# Patient Record
Sex: MALE | Race: ASIAN | NOT HISPANIC OR LATINO | URBAN - METROPOLITAN AREA
[De-identification: names, ages, dates, MRNs, and addresses within clinical notes are randomized per-mention and may not be internally consistent; named-entity substitution may affect disease eponyms.]

---

## 2017-01-01 ENCOUNTER — OFFICE VISIT (OUTPATIENT)
Dept: FAMILY MEDICINE | Facility: CLINIC | Age: 67
End: 2017-01-01
Payer: COMMERCIAL

## 2017-01-01 ENCOUNTER — TELEPHONE (OUTPATIENT)
Dept: FAMILY MEDICINE | Facility: CLINIC | Age: 67
End: 2017-01-01

## 2017-01-01 ENCOUNTER — ALLIED HEALTH/NURSE VISIT (OUTPATIENT)
Dept: NURSING | Facility: CLINIC | Age: 67
End: 2017-01-01
Payer: COMMERCIAL

## 2017-01-01 ENCOUNTER — APPOINTMENT (OUTPATIENT)
Dept: CT IMAGING | Facility: CLINIC | Age: 67
DRG: 872 | End: 2017-01-01
Attending: EMERGENCY MEDICINE
Payer: MEDICARE

## 2017-01-01 ENCOUNTER — CARE COORDINATION (OUTPATIENT)
Dept: CARE COORDINATION | Facility: CLINIC | Age: 67
End: 2017-01-01

## 2017-01-01 ENCOUNTER — APPOINTMENT (OUTPATIENT)
Dept: GENERAL RADIOLOGY | Facility: CLINIC | Age: 67
DRG: 872 | End: 2017-01-01
Attending: EMERGENCY MEDICINE
Payer: MEDICARE

## 2017-01-01 ENCOUNTER — TRANSFERRED RECORDS (OUTPATIENT)
Dept: HEALTH INFORMATION MANAGEMENT | Facility: CLINIC | Age: 67
End: 2017-01-01

## 2017-01-01 ENCOUNTER — RADIANT APPOINTMENT (OUTPATIENT)
Dept: GENERAL RADIOLOGY | Facility: CLINIC | Age: 67
End: 2017-01-01
Attending: PHYSICIAN ASSISTANT
Payer: COMMERCIAL

## 2017-01-01 ENCOUNTER — HOSPITAL ENCOUNTER (INPATIENT)
Facility: CLINIC | Age: 67
LOS: 4 days | Discharge: HOME | DRG: 872 | End: 2017-10-06
Attending: EMERGENCY MEDICINE | Admitting: HOSPITALIST
Payer: MEDICARE

## 2017-01-01 ENCOUNTER — RECORDS - HEALTHEAST (OUTPATIENT)
Dept: ADMINISTRATIVE | Facility: OTHER | Age: 67
End: 2017-01-01

## 2017-01-01 ENCOUNTER — APPOINTMENT (OUTPATIENT)
Dept: ULTRASOUND IMAGING | Facility: CLINIC | Age: 67
DRG: 872 | End: 2017-01-01
Attending: HOSPITALIST
Payer: MEDICARE

## 2017-01-01 VITALS
TEMPERATURE: 98.4 F | WEIGHT: 194 LBS | DIASTOLIC BLOOD PRESSURE: 77 MMHG | HEART RATE: 67 BPM | BODY MASS INDEX: 37.89 KG/M2 | RESPIRATION RATE: 16 BRPM | SYSTOLIC BLOOD PRESSURE: 126 MMHG

## 2017-01-01 VITALS
OXYGEN SATURATION: 100 % | RESPIRATION RATE: 16 BRPM | SYSTOLIC BLOOD PRESSURE: 124 MMHG | BODY MASS INDEX: 38.01 KG/M2 | HEART RATE: 80 BPM | WEIGHT: 193.6 LBS | DIASTOLIC BLOOD PRESSURE: 78 MMHG | TEMPERATURE: 98.1 F | HEIGHT: 60 IN

## 2017-01-01 VITALS
SYSTOLIC BLOOD PRESSURE: 106 MMHG | DIASTOLIC BLOOD PRESSURE: 69 MMHG | HEART RATE: 71 BPM | RESPIRATION RATE: 18 BRPM | WEIGHT: 186 LBS | BODY MASS INDEX: 36.33 KG/M2 | TEMPERATURE: 98.2 F

## 2017-01-01 VITALS
WEIGHT: 189 LBS | DIASTOLIC BLOOD PRESSURE: 90 MMHG | SYSTOLIC BLOOD PRESSURE: 130 MMHG | OXYGEN SATURATION: 99 % | RESPIRATION RATE: 22 BRPM | BODY MASS INDEX: 36.91 KG/M2 | TEMPERATURE: 103.2 F | HEART RATE: 94 BPM

## 2017-01-01 VITALS
SYSTOLIC BLOOD PRESSURE: 120 MMHG | WEIGHT: 189.6 LBS | DIASTOLIC BLOOD PRESSURE: 60 MMHG | HEART RATE: 60 BPM | BODY MASS INDEX: 37.03 KG/M2

## 2017-01-01 VITALS — SYSTOLIC BLOOD PRESSURE: 115 MMHG | DIASTOLIC BLOOD PRESSURE: 78 MMHG | HEART RATE: 60 BPM

## 2017-01-01 DIAGNOSIS — Z79.899 NEED FOR PROPHYLACTIC CHEMOTHERAPY: ICD-10-CM

## 2017-01-01 DIAGNOSIS — A41.9 SEVERE SEPSIS (H): ICD-10-CM

## 2017-01-01 DIAGNOSIS — B18.1 CHRONIC VIRAL HEPATITIS B WITHOUT DELTA AGENT AND WITHOUT COMA (H): ICD-10-CM

## 2017-01-01 DIAGNOSIS — Z94.0 KIDNEY REPLACED BY TRANSPLANT: ICD-10-CM

## 2017-01-01 DIAGNOSIS — Z79.52 LONG TERM CURRENT USE OF SYSTEMIC STEROIDS: ICD-10-CM

## 2017-01-01 DIAGNOSIS — R50.9 FEVER, UNSPECIFIED FEVER CAUSE: ICD-10-CM

## 2017-01-01 DIAGNOSIS — Z94.0 KIDNEY REPLACED BY TRANSPLANT: Primary | ICD-10-CM

## 2017-01-01 DIAGNOSIS — R65.20 SEVERE SEPSIS (H): ICD-10-CM

## 2017-01-01 DIAGNOSIS — N18.30 CKD (CHRONIC KIDNEY DISEASE) STAGE 3, GFR 30-59 ML/MIN (H): ICD-10-CM

## 2017-01-01 DIAGNOSIS — Z48.298 AFTERCARE FOLLOWING ORGAN TRANSPLANT: ICD-10-CM

## 2017-01-01 DIAGNOSIS — J45.901 EXACERBATION OF ASTHMA, UNSPECIFIED ASTHMA SEVERITY, UNSPECIFIED WHETHER PERSISTENT: ICD-10-CM

## 2017-01-01 DIAGNOSIS — K74.60 CIRRHOSIS OF LIVER WITH ASCITES, UNSPECIFIED HEPATIC CIRRHOSIS TYPE (H): Primary | ICD-10-CM

## 2017-01-01 DIAGNOSIS — A41.51 SEPSIS DUE TO ESCHERICHIA COLI (H): Primary | ICD-10-CM

## 2017-01-01 DIAGNOSIS — R50.9 FEVER, UNSPECIFIED FEVER CAUSE: Primary | ICD-10-CM

## 2017-01-01 DIAGNOSIS — Z59.9 FINANCIAL DIFFICULTIES: Primary | ICD-10-CM

## 2017-01-01 DIAGNOSIS — Z94.0 KIDNEY TRANSPLANT STATUS, LIVING UNRELATED DONOR: ICD-10-CM

## 2017-01-01 DIAGNOSIS — Z01.30 BLOOD PRESSURE CHECK: Primary | ICD-10-CM

## 2017-01-01 DIAGNOSIS — R18.8 CIRRHOSIS OF LIVER WITH ASCITES, UNSPECIFIED HEPATIC CIRRHOSIS TYPE (H): Primary | ICD-10-CM

## 2017-01-01 DIAGNOSIS — R79.89 ELEVATED SERUM CREATININE: ICD-10-CM

## 2017-01-01 LAB
ALBUMIN FLD-MCNC: 0.6 G/DL
ALBUMIN SERPL-MCNC: 2.2 G/DL (ref 3.4–5)
ALBUMIN SERPL-MCNC: 2.3 G/DL (ref 3.4–5)
ALBUMIN SERPL-MCNC: 2.7 G/DL (ref 3.4–5)
ALBUMIN UR-MCNC: 100 MG/DL
ALBUMIN UR-MCNC: 30 MG/DL
ALP SERPL-CCNC: 103 U/L (ref 40–150)
ALP SERPL-CCNC: 138 U/L (ref 40–150)
ALP SERPL-CCNC: 180 U/L (ref 40–150)
ALT SERPL W P-5'-P-CCNC: 56 U/L (ref 0–70)
ALT SERPL W P-5'-P-CCNC: 65 U/L (ref 0–70)
ALT SERPL W P-5'-P-CCNC: 77 U/L (ref 0–70)
AMORPH CRY #/AREA URNS HPF: ABNORMAL /HPF
ANION GAP SERPL CALCULATED.3IONS-SCNC: 10 MMOL/L (ref 3–14)
ANION GAP SERPL CALCULATED.3IONS-SCNC: 5 MMOL/L (ref 3–14)
ANION GAP SERPL CALCULATED.3IONS-SCNC: 6 MMOL/L (ref 3–14)
ANION GAP SERPL CALCULATED.3IONS-SCNC: 7 MMOL/L (ref 3–14)
ANION GAP SERPL CALCULATED.3IONS-SCNC: 7 MMOL/L (ref 3–14)
ANION GAP SERPL CALCULATED.3IONS-SCNC: 8 MMOL/L (ref 3–14)
ANION GAP SERPL CALCULATED.3IONS-SCNC: 8 MMOL/L (ref 3–14)
APPEARANCE FLD: NORMAL
APPEARANCE UR: CLEAR
APPEARANCE UR: CLEAR
AST SERPL W P-5'-P-CCNC: 47 U/L (ref 0–45)
AST SERPL W P-5'-P-CCNC: 47 U/L (ref 0–45)
AST SERPL W P-5'-P-CCNC: 71 U/L (ref 0–45)
BACTERIA #/AREA URNS HPF: ABNORMAL /HPF
BACTERIA #/AREA URNS HPF: ABNORMAL /HPF
BACTERIA SPEC CULT: ABNORMAL
BACTERIA SPEC CULT: NO GROWTH
BACTERIA SPEC CULT: NORMAL
BASOPHILS # BLD AUTO: 0 10E9/L (ref 0–0.2)
BASOPHILS NFR BLD AUTO: 0 %
BASOPHILS NFR BLD AUTO: 0.1 %
BASOPHILS NFR BLD AUTO: 0.2 %
BILIRUB DIRECT SERPL-MCNC: 0.6 MG/DL (ref 0–0.2)
BILIRUB DIRECT SERPL-MCNC: 0.9 MG/DL (ref 0–0.2)
BILIRUB SERPL-MCNC: 1.3 MG/DL (ref 0.2–1.3)
BILIRUB SERPL-MCNC: 1.8 MG/DL (ref 0.2–1.3)
BILIRUB SERPL-MCNC: 1.9 MG/DL (ref 0.2–1.3)
BILIRUB UR QL STRIP: ABNORMAL
BILIRUB UR QL STRIP: NEGATIVE
BUN SERPL-MCNC: 15 MG/DL (ref 7–30)
BUN SERPL-MCNC: 19 MG/DL (ref 7–30)
BUN SERPL-MCNC: 21 MG/DL (ref 7–30)
BUN SERPL-MCNC: 21 MG/DL (ref 7–30)
BUN SERPL-MCNC: 22 MG/DL (ref 7–30)
BUN SERPL-MCNC: 27 MG/DL (ref 7–30)
BUN SERPL-MCNC: 29 MG/DL (ref 7–30)
C DIFF TOX B STL QL: NEGATIVE
CALCIUM SERPL-MCNC: 7.4 MG/DL (ref 8.5–10.1)
CALCIUM SERPL-MCNC: 7.7 MG/DL (ref 8.5–10.1)
CALCIUM SERPL-MCNC: 7.8 MG/DL (ref 8.5–10.1)
CALCIUM SERPL-MCNC: 8.1 MG/DL (ref 8.5–10.1)
CALCIUM SERPL-MCNC: 8.2 MG/DL (ref 8.5–10.1)
CALCIUM SERPL-MCNC: 8.3 MG/DL (ref 8.5–10.1)
CALCIUM SERPL-MCNC: 8.5 MG/DL (ref 8.5–10.1)
CHLORIDE SERPL-SCNC: 103 MMOL/L (ref 94–109)
CHLORIDE SERPL-SCNC: 106 MMOL/L (ref 94–109)
CHLORIDE SERPL-SCNC: 106 MMOL/L (ref 94–109)
CHLORIDE SERPL-SCNC: 108 MMOL/L (ref 94–109)
CHLORIDE SERPL-SCNC: 109 MMOL/L (ref 94–109)
CHLORIDE SERPL-SCNC: 110 MMOL/L (ref 94–109)
CHLORIDE SERPL-SCNC: 110 MMOL/L (ref 94–109)
CO2 BLDCOV-SCNC: 16 MMOL/L (ref 21–28)
CO2 SERPL-SCNC: 18 MMOL/L (ref 20–32)
CO2 SERPL-SCNC: 18 MMOL/L (ref 20–32)
CO2 SERPL-SCNC: 19 MMOL/L (ref 20–32)
CO2 SERPL-SCNC: 20 MMOL/L (ref 20–32)
CO2 SERPL-SCNC: 20 MMOL/L (ref 20–32)
CO2 SERPL-SCNC: 23 MMOL/L (ref 20–32)
CO2 SERPL-SCNC: 23 MMOL/L (ref 20–32)
COLOR FLD: YELLOW
COLOR UR AUTO: ABNORMAL
COLOR UR AUTO: YELLOW
COPATH REPORT: NORMAL
CREAT SERPL-MCNC: 1.26 MG/DL (ref 0.66–1.25)
CREAT SERPL-MCNC: 1.4 MG/DL (ref 0.66–1.25)
CREAT SERPL-MCNC: 1.5 MG/DL (ref 0.66–1.25)
CREAT SERPL-MCNC: 1.51 MG/DL (ref 0.66–1.25)
CREAT SERPL-MCNC: 1.55 MG/DL (ref 0.66–1.25)
CREAT SERPL-MCNC: 1.66 MG/DL (ref 0.66–1.25)
CREAT SERPL-MCNC: 1.72 MG/DL (ref 0.66–1.25)
CYCLOSPORINE BLD LC/MS/MS-MCNC: 157 UG/L (ref 50–400)
DIFFERENTIAL METHOD BLD: ABNORMAL
EOSINOPHIL # BLD AUTO: 0 10E9/L (ref 0–0.7)
EOSINOPHIL # BLD AUTO: 0.1 10E9/L (ref 0–0.7)
EOSINOPHIL NFR BLD AUTO: 0 %
EOSINOPHIL NFR BLD AUTO: 0 %
EOSINOPHIL NFR BLD AUTO: 0.1 %
EOSINOPHIL NFR BLD AUTO: 0.1 %
EOSINOPHIL NFR BLD AUTO: 0.6 %
EOSINOPHIL NFR BLD AUTO: 1.6 %
ERYTHROCYTE [DISTWIDTH] IN BLOOD BY AUTOMATED COUNT: 14.2 % (ref 10–15)
ERYTHROCYTE [DISTWIDTH] IN BLOOD BY AUTOMATED COUNT: 14.4 % (ref 10–15)
ERYTHROCYTE [DISTWIDTH] IN BLOOD BY AUTOMATED COUNT: 14.5 % (ref 10–15)
ERYTHROCYTE [DISTWIDTH] IN BLOOD BY AUTOMATED COUNT: 14.6 % (ref 10–15)
ERYTHROCYTE [DISTWIDTH] IN BLOOD BY AUTOMATED COUNT: 14.7 % (ref 10–15)
ERYTHROCYTE [DISTWIDTH] IN BLOOD BY AUTOMATED COUNT: 14.8 % (ref 10–15)
ERYTHROCYTE [DISTWIDTH] IN BLOOD BY AUTOMATED COUNT: 15.3 % (ref 10–15)
ERYTHROCYTE [SEDIMENTATION RATE] IN BLOOD BY WESTERGREN METHOD: 28 MM/H (ref 0–20)
FLUAV+FLUBV RNA SPEC QL NAA+PROBE: NEGATIVE
FLUAV+FLUBV RNA SPEC QL NAA+PROBE: NEGATIVE
FUNGUS SPEC CULT: NORMAL
GFR SERPL CREATININE-BSD FRML MDRD: 40 ML/MIN/1.7M2
GFR SERPL CREATININE-BSD FRML MDRD: 42 ML/MIN/1.7M2
GFR SERPL CREATININE-BSD FRML MDRD: 45 ML/MIN/1.7M2
GFR SERPL CREATININE-BSD FRML MDRD: 46 ML/MIN/1.7M2
GFR SERPL CREATININE-BSD FRML MDRD: 47 ML/MIN/1.7M2
GFR SERPL CREATININE-BSD FRML MDRD: 51 ML/MIN/1.7M2
GFR SERPL CREATININE-BSD FRML MDRD: 57 ML/MIN/1.7M2
GLUCOSE BLDC GLUCOMTR-MCNC: 100 MG/DL (ref 70–99)
GLUCOSE BLDC GLUCOMTR-MCNC: 101 MG/DL (ref 70–99)
GLUCOSE BLDC GLUCOMTR-MCNC: 105 MG/DL (ref 70–99)
GLUCOSE BLDC GLUCOMTR-MCNC: 162 MG/DL (ref 70–99)
GLUCOSE BLDC GLUCOMTR-MCNC: 96 MG/DL (ref 70–99)
GLUCOSE FLD-MCNC: 153 MG/DL
GLUCOSE SERPL-MCNC: 107 MG/DL (ref 70–99)
GLUCOSE SERPL-MCNC: 109 MG/DL (ref 70–99)
GLUCOSE SERPL-MCNC: 109 MG/DL (ref 70–99)
GLUCOSE SERPL-MCNC: 114 MG/DL (ref 70–99)
GLUCOSE SERPL-MCNC: 169 MG/DL (ref 70–99)
GLUCOSE SERPL-MCNC: 92 MG/DL (ref 70–99)
GLUCOSE SERPL-MCNC: 93 MG/DL (ref 70–99)
GLUCOSE UR STRIP-MCNC: NEGATIVE MG/DL
GLUCOSE UR STRIP-MCNC: NEGATIVE MG/DL
GRAM STN SPEC: NORMAL
HCT VFR BLD AUTO: 30.1 % (ref 40–53)
HCT VFR BLD AUTO: 31.4 % (ref 40–53)
HCT VFR BLD AUTO: 31.5 % (ref 40–53)
HCT VFR BLD AUTO: 31.9 % (ref 40–53)
HCT VFR BLD AUTO: 32.6 % (ref 40–53)
HCT VFR BLD AUTO: 33.6 % (ref 40–53)
HCT VFR BLD AUTO: 35.3 % (ref 40–53)
HGB BLD-MCNC: 10.2 G/DL (ref 13.3–17.7)
HGB BLD-MCNC: 10.4 G/DL (ref 13.3–17.7)
HGB BLD-MCNC: 10.5 G/DL (ref 13.3–17.7)
HGB BLD-MCNC: 11 G/DL (ref 13.3–17.7)
HGB BLD-MCNC: 11.1 G/DL (ref 13.3–17.7)
HGB BLD-MCNC: 11.6 G/DL (ref 13.3–17.7)
HGB BLD-MCNC: 9.9 G/DL (ref 13.3–17.7)
HGB UR QL STRIP: ABNORMAL
HGB UR QL STRIP: NEGATIVE
IMM GRANULOCYTES # BLD: 0 10E9/L (ref 0–0.4)
IMM GRANULOCYTES # BLD: 0 10E9/L (ref 0–0.4)
IMM GRANULOCYTES # BLD: 0.1 10E9/L (ref 0–0.4)
IMM GRANULOCYTES NFR BLD: 0.5 %
IMM GRANULOCYTES NFR BLD: 0.6 %
IMM GRANULOCYTES NFR BLD: 0.8 %
INR PPP: 1.21 (ref 0.86–1.14)
INR PPP: 1.76 (ref 0.86–1.14)
INTERPRETATION ECG - MUSE: NORMAL
KETONES UR STRIP-MCNC: NEGATIVE MG/DL
KETONES UR STRIP-MCNC: NEGATIVE MG/DL
LACTATE BLD-SCNC: 1.4 MMOL/L (ref 0.7–2)
LACTATE BLD-SCNC: 2.1 MMOL/L (ref 0.7–2)
LACTATE BLD-SCNC: 2.9 MMOL/L (ref 0.7–2.1)
LDH FLD L TO P-CCNC: 49 U/L
LDH SERPL L TO P-CCNC: 164 U/L (ref 85–227)
LEUKOCYTE ESTERASE UR QL STRIP: NEGATIVE
LEUKOCYTE ESTERASE UR QL STRIP: NEGATIVE
LIPASE SERPL-CCNC: 182 U/L (ref 73–393)
LYMPHOCYTES # BLD AUTO: 0.2 10E9/L (ref 0.8–5.3)
LYMPHOCYTES # BLD AUTO: 0.4 10E9/L (ref 0.8–5.3)
LYMPHOCYTES # BLD AUTO: 0.6 10E9/L (ref 0.8–5.3)
LYMPHOCYTES # BLD AUTO: 0.7 10E9/L (ref 0.8–5.3)
LYMPHOCYTES NFR BLD AUTO: 14 %
LYMPHOCYTES NFR BLD AUTO: 17.2 %
LYMPHOCYTES NFR BLD AUTO: 4.9 %
LYMPHOCYTES NFR BLD AUTO: 4.9 %
LYMPHOCYTES NFR BLD AUTO: 5 %
LYMPHOCYTES NFR BLD AUTO: 5 %
LYMPHOCYTES NFR FLD MANUAL: 3 %
Lab: ABNORMAL
Lab: ABNORMAL
Lab: NORMAL
MCH RBC QN AUTO: 33.1 PG (ref 26.5–33)
MCH RBC QN AUTO: 33.2 PG (ref 26.5–33)
MCH RBC QN AUTO: 33.3 PG (ref 26.5–33)
MCH RBC QN AUTO: 33.4 PG (ref 26.5–33)
MCH RBC QN AUTO: 33.5 PG (ref 26.5–33)
MCH RBC QN AUTO: 33.7 PG (ref 26.5–33)
MCH RBC QN AUTO: 34.1 PG (ref 26.5–33)
MCHC RBC AUTO-ENTMCNC: 32 G/DL (ref 31.5–36.5)
MCHC RBC AUTO-ENTMCNC: 32.9 G/DL (ref 31.5–36.5)
MCHC RBC AUTO-ENTMCNC: 32.9 G/DL (ref 31.5–36.5)
MCHC RBC AUTO-ENTMCNC: 33 G/DL (ref 31.5–36.5)
MCHC RBC AUTO-ENTMCNC: 33 G/DL (ref 31.5–36.5)
MCHC RBC AUTO-ENTMCNC: 33.4 G/DL (ref 31.5–36.5)
MCHC RBC AUTO-ENTMCNC: 33.7 G/DL (ref 31.5–36.5)
MCV RBC AUTO: 100 FL (ref 78–100)
MCV RBC AUTO: 100 FL (ref 78–100)
MCV RBC AUTO: 101 FL (ref 78–100)
MCV RBC AUTO: 101 FL (ref 78–100)
MCV RBC AUTO: 102 FL (ref 78–100)
MCV RBC AUTO: 103 FL (ref 78–100)
MCV RBC AUTO: 104 FL (ref 78–100)
METAMYELOCYTES # BLD: 0 10E9/L
METAMYELOCYTES NFR BLD MANUAL: 1 %
MONOCYTES # BLD AUTO: 0.4 10E9/L (ref 0–1.3)
MONOCYTES # BLD AUTO: 0.6 10E9/L (ref 0–1.3)
MONOCYTES # BLD AUTO: 0.6 10E9/L (ref 0–1.3)
MONOCYTES # BLD AUTO: 0.7 10E9/L (ref 0–1.3)
MONOCYTES # BLD AUTO: 0.7 10E9/L (ref 0–1.3)
MONOCYTES # BLD AUTO: 1.4 10E9/L (ref 0–1.3)
MONOCYTES NFR BLD AUTO: 11.3 %
MONOCYTES NFR BLD AUTO: 14.4 %
MONOCYTES NFR BLD AUTO: 16.4 %
MONOCYTES NFR BLD AUTO: 5.1 %
MONOCYTES NFR BLD AUTO: 8.7 %
MONOCYTES NFR BLD AUTO: 9 %
MONOS+MACROS NFR FLD MANUAL: 43 %
MUCOUS THREADS #/AREA URNS LPF: PRESENT /LPF
MYCOPHENOLATE SERPL LC/MS/MS-MCNC: <0.25 MG/L (ref 1–3.5)
MYCOPHENOLATE-G SERPL LC/MS/MS-MCNC: 49.1 MG/L (ref 30–95)
NEUTROPHILS # BLD AUTO: 10.4 10E9/L (ref 1.6–8.3)
NEUTROPHILS # BLD AUTO: 11.1 10E9/L (ref 1.6–8.3)
NEUTROPHILS # BLD AUTO: 2.4 10E9/L (ref 1.6–8.3)
NEUTROPHILS # BLD AUTO: 3.3 10E9/L (ref 1.6–8.3)
NEUTROPHILS # BLD AUTO: 3.5 10E9/L (ref 1.6–8.3)
NEUTROPHILS # BLD AUTO: 6.9 10E9/L (ref 1.6–8.3)
NEUTROPHILS NFR BLD AUTO: 64.8 %
NEUTROPHILS NFR BLD AUTO: 70.4 %
NEUTROPHILS NFR BLD AUTO: 83.6 %
NEUTROPHILS NFR BLD AUTO: 85 %
NEUTROPHILS NFR BLD AUTO: 85.7 %
NEUTROPHILS NFR BLD AUTO: 89 %
NEUTS BAND NFR FLD MANUAL: 54 %
NITRATE UR QL: NEGATIVE
NITRATE UR QL: NEGATIVE
NRBC # BLD AUTO: 0 10*3/UL
NRBC BLD AUTO-RTO: 0 /100
PCO2 BLDV: 24 MM HG (ref 40–50)
PH BLDV: 7.42 PH (ref 7.32–7.43)
PH UR STRIP: 5 PH (ref 5–7)
PH UR STRIP: 6.5 PH (ref 5–7)
PLATELET # BLD AUTO: 29 10E9/L (ref 150–450)
PLATELET # BLD AUTO: 39 10E9/L (ref 150–450)
PLATELET # BLD AUTO: 39 10E9/L (ref 150–450)
PLATELET # BLD AUTO: 50 10E9/L (ref 150–450)
PLATELET # BLD AUTO: 53 10E9/L (ref 150–450)
PLATELET # BLD AUTO: 53 10E9/L (ref 150–450)
PLATELET # BLD AUTO: 56 10E9/L (ref 150–450)
PLATELET # BLD EST: ABNORMAL 10*3/UL
PO2 BLDV: 40 MM HG (ref 25–47)
POTASSIUM SERPL-SCNC: 3.8 MMOL/L (ref 3.4–5.3)
POTASSIUM SERPL-SCNC: 3.9 MMOL/L (ref 3.4–5.3)
POTASSIUM SERPL-SCNC: 4 MMOL/L (ref 3.4–5.3)
POTASSIUM SERPL-SCNC: 4 MMOL/L (ref 3.4–5.3)
PROCALCITONIN SERPL-MCNC: 40.69 NG/ML
PROCALCITONIN SERPL-MCNC: 7.61 NG/ML
PROT FLD-MCNC: 1.3 G/DL
PROT SERPL-MCNC: 5.2 G/DL (ref 6.8–8.8)
PROT SERPL-MCNC: 5.5 G/DL (ref 6.8–8.8)
PROT SERPL-MCNC: 6.1 G/DL (ref 6.8–8.8)
RBC # BLD AUTO: 2.96 10E12/L (ref 4.4–5.9)
RBC # BLD AUTO: 3.05 10E12/L (ref 4.4–5.9)
RBC # BLD AUTO: 3.08 10E12/L (ref 4.4–5.9)
RBC # BLD AUTO: 3.13 10E12/L (ref 4.4–5.9)
RBC # BLD AUTO: 3.26 10E12/L (ref 4.4–5.9)
RBC # BLD AUTO: 3.33 10E12/L (ref 4.4–5.9)
RBC # BLD AUTO: 3.49 10E12/L (ref 4.4–5.9)
RBC #/AREA URNS AUTO: 0 /HPF (ref 0–2)
RBC #/AREA URNS AUTO: ABNORMAL /HPF
RBC MORPH BLD: ABNORMAL
RSV RNA SPEC NAA+PROBE: NEGATIVE
SAO2 % BLDV FROM PO2: 77 %
SODIUM SERPL-SCNC: 131 MMOL/L (ref 133–144)
SODIUM SERPL-SCNC: 131 MMOL/L (ref 133–144)
SODIUM SERPL-SCNC: 133 MMOL/L (ref 133–144)
SODIUM SERPL-SCNC: 134 MMOL/L (ref 133–144)
SODIUM SERPL-SCNC: 134 MMOL/L (ref 133–144)
SODIUM SERPL-SCNC: 140 MMOL/L (ref 133–144)
SODIUM SERPL-SCNC: 141 MMOL/L (ref 133–144)
SOURCE: ABNORMAL
SOURCE: ABNORMAL
SP GR UR STRIP: 1.02 (ref 1–1.03)
SP GR UR STRIP: 1.03 (ref 1–1.03)
SPECIMEN SOURCE FLD: NORMAL
SPECIMEN SOURCE: ABNORMAL
SPECIMEN SOURCE: ABNORMAL
SPECIMEN SOURCE: NORMAL
SQUAMOUS #/AREA URNS AUTO: <1 /HPF (ref 0–1)
TME LAST DOSE: ABNORMAL H
TME LAST DOSE: NORMAL H
TRIGL FLD-MCNC: 25 MG/DL
UROBILINOGEN UR STRIP-ACNC: 0.2 EU/DL (ref 0.2–1)
UROBILINOGEN UR STRIP-MCNC: 0 MG/DL (ref 0–2)
WBC # BLD AUTO: 12.4 10E9/L (ref 4–11)
WBC # BLD AUTO: 12.5 10E9/L (ref 4–11)
WBC # BLD AUTO: 3.7 10E9/L (ref 4–11)
WBC # BLD AUTO: 3.7 10E9/L (ref 4–11)
WBC # BLD AUTO: 4.1 10E9/L (ref 4–11)
WBC # BLD AUTO: 4.7 10E9/L (ref 4–11)
WBC # BLD AUTO: 8.1 10E9/L (ref 4–11)
WBC # FLD AUTO: 1915 /UL
WBC #/AREA URNS AUTO: 2 /HPF (ref 0–2)
WBC #/AREA URNS AUTO: ABNORMAL /HPF

## 2017-01-01 PROCEDURE — 85025 COMPLETE CBC W/AUTO DIFF WBC: CPT | Performed by: PHYSICIAN ASSISTANT

## 2017-01-01 PROCEDURE — 25000132 ZZH RX MED GY IP 250 OP 250 PS 637: Mod: GY | Performed by: HOSPITALIST

## 2017-01-01 PROCEDURE — 36415 COLL VENOUS BLD VENIPUNCTURE: CPT | Performed by: INTERNAL MEDICINE

## 2017-01-01 PROCEDURE — 80053 COMPREHEN METABOLIC PANEL: CPT | Performed by: EMERGENCY MEDICINE

## 2017-01-01 PROCEDURE — 84157 ASSAY OF PROTEIN OTHER: CPT | Performed by: HOSPITALIST

## 2017-01-01 PROCEDURE — 96361 HYDRATE IV INFUSION ADD-ON: CPT

## 2017-01-01 PROCEDURE — 99207 ZZC NO CHARGE NURSE ONLY: CPT

## 2017-01-01 PROCEDURE — 99223 1ST HOSP IP/OBS HIGH 75: CPT | Mod: AI | Performed by: HOSPITALIST

## 2017-01-01 PROCEDURE — 83605 ASSAY OF LACTIC ACID: CPT | Performed by: EMERGENCY MEDICINE

## 2017-01-01 PROCEDURE — 25000125 ZZHC RX 250: Performed by: HOSPITALIST

## 2017-01-01 PROCEDURE — 82565 ASSAY OF CREATININE: CPT | Performed by: INTERNAL MEDICINE

## 2017-01-01 PROCEDURE — 00000146 ZZHCL STATISTIC GLUCOSE BY METER IP

## 2017-01-01 PROCEDURE — 71020 XR CHEST 2 VW: CPT

## 2017-01-01 PROCEDURE — 80048 BASIC METABOLIC PNL TOTAL CA: CPT | Performed by: INTERNAL MEDICINE

## 2017-01-01 PROCEDURE — 25000128 H RX IP 250 OP 636: Performed by: HOSPITALIST

## 2017-01-01 PROCEDURE — 36415 COLL VENOUS BLD VENIPUNCTURE: CPT | Performed by: SPECIALIST

## 2017-01-01 PROCEDURE — A9270 NON-COVERED ITEM OR SERVICE: HCPCS | Mod: GY | Performed by: HOSPITALIST

## 2017-01-01 PROCEDURE — 84145 PROCALCITONIN (PCT): CPT | Performed by: EMERGENCY MEDICINE

## 2017-01-01 PROCEDURE — 99233 SBSQ HOSP IP/OBS HIGH 50: CPT | Performed by: INTERNAL MEDICINE

## 2017-01-01 PROCEDURE — 99232 SBSQ HOSP IP/OBS MODERATE 35: CPT | Performed by: INTERNAL MEDICINE

## 2017-01-01 PROCEDURE — 80180 DRUG SCRN QUAN MYCOPHENOLATE: CPT | Performed by: INTERNAL MEDICINE

## 2017-01-01 PROCEDURE — 87040 BLOOD CULTURE FOR BACTERIA: CPT | Performed by: EMERGENCY MEDICINE

## 2017-01-01 PROCEDURE — 82803 BLOOD GASES ANY COMBINATION: CPT

## 2017-01-01 PROCEDURE — 80048 BASIC METABOLIC PNL TOTAL CA: CPT | Performed by: HOSPITALIST

## 2017-01-01 PROCEDURE — 12000000 ZZH R&B MED SURG/OB

## 2017-01-01 PROCEDURE — 83605 ASSAY OF LACTIC ACID: CPT | Performed by: INTERNAL MEDICINE

## 2017-01-01 PROCEDURE — 85025 COMPLETE CBC W/AUTO DIFF WBC: CPT | Performed by: INTERNAL MEDICINE

## 2017-01-01 PROCEDURE — 36415 COLL VENOUS BLD VENIPUNCTURE: CPT | Performed by: HOSPITALIST

## 2017-01-01 PROCEDURE — 87186 SC STD MICRODIL/AGAR DIL: CPT | Performed by: EMERGENCY MEDICINE

## 2017-01-01 PROCEDURE — 83690 ASSAY OF LIPASE: CPT | Performed by: EMERGENCY MEDICINE

## 2017-01-01 PROCEDURE — 87086 URINE CULTURE/COLONY COUNT: CPT | Performed by: EMERGENCY MEDICINE

## 2017-01-01 PROCEDURE — 88112 CYTOPATH CELL ENHANCE TECH: CPT | Mod: 26 | Performed by: HOSPITALIST

## 2017-01-01 PROCEDURE — 80158 DRUG ASSAY CYCLOSPORINE: CPT | Performed by: INTERNAL MEDICINE

## 2017-01-01 PROCEDURE — 80048 BASIC METABOLIC PNL TOTAL CA: CPT | Performed by: PHYSICIAN ASSISTANT

## 2017-01-01 PROCEDURE — 84478 ASSAY OF TRIGLYCERIDES: CPT | Performed by: HOSPITALIST

## 2017-01-01 PROCEDURE — 25000131 ZZH RX MED GY IP 250 OP 636 PS 637: Mod: GY | Performed by: INTERNAL MEDICINE

## 2017-01-01 PROCEDURE — 99495 TRANSJ CARE MGMT MOD F2F 14D: CPT | Performed by: PHYSICIAN ASSISTANT

## 2017-01-01 PROCEDURE — 00000155 ZZHCL STATISTIC H-CELL BLOCK W/STAIN: Performed by: HOSPITALIST

## 2017-01-01 PROCEDURE — 25000132 ZZH RX MED GY IP 250 OP 250 PS 637: Mod: GY | Performed by: EMERGENCY MEDICINE

## 2017-01-01 PROCEDURE — 36415 COLL VENOUS BLD VENIPUNCTURE: CPT | Performed by: PHYSICIAN ASSISTANT

## 2017-01-01 PROCEDURE — 85025 COMPLETE CBC W/AUTO DIFF WBC: CPT | Performed by: EMERGENCY MEDICINE

## 2017-01-01 PROCEDURE — 85610 PROTHROMBIN TIME: CPT | Performed by: RADIOLOGY

## 2017-01-01 PROCEDURE — A9270 NON-COVERED ITEM OR SERVICE: HCPCS | Mod: GY | Performed by: EMERGENCY MEDICINE

## 2017-01-01 PROCEDURE — 84155 ASSAY OF PROTEIN SERUM: CPT | Performed by: HOSPITALIST

## 2017-01-01 PROCEDURE — 85610 PROTHROMBIN TIME: CPT | Performed by: PHYSICIAN ASSISTANT

## 2017-01-01 PROCEDURE — 88112 CYTOPATH CELL ENHANCE TECH: CPT | Performed by: HOSPITALIST

## 2017-01-01 PROCEDURE — 99239 HOSP IP/OBS DSCHRG MGMT >30: CPT | Performed by: INTERNAL MEDICINE

## 2017-01-01 PROCEDURE — 87631 RESP VIRUS 3-5 TARGETS: CPT | Performed by: HOSPITALIST

## 2017-01-01 PROCEDURE — 81001 URINALYSIS AUTO W/SCOPE: CPT | Performed by: EMERGENCY MEDICINE

## 2017-01-01 PROCEDURE — 80076 HEPATIC FUNCTION PANEL: CPT | Performed by: PHYSICIAN ASSISTANT

## 2017-01-01 PROCEDURE — 87040 BLOOD CULTURE FOR BACTERIA: CPT | Performed by: PHYSICIAN ASSISTANT

## 2017-01-01 PROCEDURE — 87077 CULTURE AEROBIC IDENTIFY: CPT | Performed by: EMERGENCY MEDICINE

## 2017-01-01 PROCEDURE — 36415 COLL VENOUS BLD VENIPUNCTURE: CPT

## 2017-01-01 PROCEDURE — 25000132 ZZH RX MED GY IP 250 OP 250 PS 637: Performed by: EMERGENCY MEDICINE

## 2017-01-01 PROCEDURE — 93005 ELECTROCARDIOGRAM TRACING: CPT

## 2017-01-01 PROCEDURE — 87800 DETECT AGNT MULT DNA DIREC: CPT | Performed by: EMERGENCY MEDICINE

## 2017-01-01 PROCEDURE — 87075 CULTR BACTERIA EXCEPT BLOOD: CPT | Performed by: HOSPITALIST

## 2017-01-01 PROCEDURE — 88305 TISSUE EXAM BY PATHOLOGIST: CPT | Mod: 26 | Performed by: HOSPITALIST

## 2017-01-01 PROCEDURE — 80048 BASIC METABOLIC PNL TOTAL CA: CPT | Performed by: SPECIALIST

## 2017-01-01 PROCEDURE — 80076 HEPATIC FUNCTION PANEL: CPT | Performed by: HOSPITALIST

## 2017-01-01 PROCEDURE — 83615 LACTATE (LD) (LDH) ENZYME: CPT | Performed by: HOSPITALIST

## 2017-01-01 PROCEDURE — 87205 SMEAR GRAM STAIN: CPT | Performed by: HOSPITALIST

## 2017-01-01 PROCEDURE — 82042 OTHER SOURCE ALBUMIN QUAN EA: CPT | Performed by: HOSPITALIST

## 2017-01-01 PROCEDURE — 99000 SPECIMEN HANDLING OFFICE-LAB: CPT | Performed by: FAMILY MEDICINE

## 2017-01-01 PROCEDURE — 25000128 H RX IP 250 OP 636: Performed by: EMERGENCY MEDICINE

## 2017-01-01 PROCEDURE — 81001 URINALYSIS AUTO W/SCOPE: CPT | Performed by: PHYSICIAN ASSISTANT

## 2017-01-01 PROCEDURE — 99214 OFFICE O/P EST MOD 30 MIN: CPT | Performed by: PHYSICIAN ASSISTANT

## 2017-01-01 PROCEDURE — 89051 BODY FLUID CELL COUNT: CPT | Performed by: HOSPITALIST

## 2017-01-01 PROCEDURE — 96365 THER/PROPH/DIAG IV INF INIT: CPT

## 2017-01-01 PROCEDURE — 90662 IIV NO PRSV INCREASED AG IM: CPT | Performed by: HOSPITALIST

## 2017-01-01 PROCEDURE — 36415 COLL VENOUS BLD VENIPUNCTURE: CPT | Performed by: EMERGENCY MEDICINE

## 2017-01-01 PROCEDURE — 82945 GLUCOSE OTHER FLUID: CPT | Performed by: HOSPITALIST

## 2017-01-01 PROCEDURE — 25000131 ZZH RX MED GY IP 250 OP 636 PS 637: Mod: GY | Performed by: HOSPITALIST

## 2017-01-01 PROCEDURE — 87493 C DIFF AMPLIFIED PROBE: CPT | Performed by: HOSPITALIST

## 2017-01-01 PROCEDURE — 99285 EMERGENCY DEPT VISIT HI MDM: CPT | Mod: 25

## 2017-01-01 PROCEDURE — 87102 FUNGUS ISOLATION CULTURE: CPT | Performed by: HOSPITALIST

## 2017-01-01 PROCEDURE — 0W9G3ZZ DRAINAGE OF PERITONEAL CAVITY, PERCUTANEOUS APPROACH: ICD-10-PCS | Performed by: RADIOLOGY

## 2017-01-01 PROCEDURE — 87070 CULTURE OTHR SPECIMN AEROBIC: CPT | Performed by: HOSPITALIST

## 2017-01-01 PROCEDURE — 85652 RBC SED RATE AUTOMATED: CPT | Performed by: PHYSICIAN ASSISTANT

## 2017-01-01 PROCEDURE — 27210190 US PARACENTESIS

## 2017-01-01 PROCEDURE — 84145 PROCALCITONIN (PCT): CPT | Performed by: HOSPITALIST

## 2017-01-01 PROCEDURE — 83605 ASSAY OF LACTIC ACID: CPT

## 2017-01-01 PROCEDURE — 85025 COMPLETE CBC W/AUTO DIFF WBC: CPT | Performed by: HOSPITALIST

## 2017-01-01 PROCEDURE — 00000102 ZZHCL STATISTIC CYTO WRIGHT STAIN TC: Performed by: HOSPITALIST

## 2017-01-01 PROCEDURE — 25000125 ZZHC RX 250: Performed by: RADIOLOGY

## 2017-01-01 PROCEDURE — 74176 CT ABD & PELVIS W/O CONTRAST: CPT

## 2017-01-01 PROCEDURE — 36415 COLL VENOUS BLD VENIPUNCTURE: CPT | Performed by: FAMILY MEDICINE

## 2017-01-01 PROCEDURE — 85027 COMPLETE CBC AUTOMATED: CPT | Performed by: FAMILY MEDICINE

## 2017-01-01 PROCEDURE — 88305 TISSUE EXAM BY PATHOLOGIST: CPT | Performed by: HOSPITALIST

## 2017-01-01 PROCEDURE — 82040 ASSAY OF SERUM ALBUMIN: CPT | Performed by: HOSPITALIST

## 2017-01-01 RX ORDER — CYCLOSPORINE 25 MG/1
25 CAPSULE, LIQUID FILLED ORAL EVERY EVENING
COMMUNITY

## 2017-01-01 RX ORDER — POTASSIUM CL/LIDO/0.9 % NACL 10MEQ/0.1L
10 INTRAVENOUS SOLUTION, PIGGYBACK (ML) INTRAVENOUS
Status: DISCONTINUED | OUTPATIENT
Start: 2017-01-01 | End: 2017-01-01 | Stop reason: HOSPADM

## 2017-01-01 RX ORDER — POTASSIUM CHLORIDE 29.8 MG/ML
20 INJECTION INTRAVENOUS
Status: DISCONTINUED | OUTPATIENT
Start: 2017-01-01 | End: 2017-01-01 | Stop reason: HOSPADM

## 2017-01-01 RX ORDER — POTASSIUM CHLORIDE 1.5 G/1.58G
20-40 POWDER, FOR SOLUTION ORAL
Status: DISCONTINUED | OUTPATIENT
Start: 2017-01-01 | End: 2017-01-01 | Stop reason: HOSPADM

## 2017-01-01 RX ORDER — SODIUM CHLORIDE 9 MG/ML
1000 INJECTION, SOLUTION INTRAVENOUS CONTINUOUS
Status: DISCONTINUED | OUTPATIENT
Start: 2017-01-01 | End: 2017-01-01

## 2017-01-01 RX ORDER — ACETAMINOPHEN 500 MG
500 TABLET ORAL EVERY 4 HOURS PRN
Status: DISCONTINUED | OUTPATIENT
Start: 2017-01-01 | End: 2017-01-01 | Stop reason: HOSPADM

## 2017-01-01 RX ORDER — CYCLOSPORINE 25 MG/1
25 CAPSULE ORAL EVERY EVENING
Status: DISCONTINUED | OUTPATIENT
Start: 2017-01-01 | End: 2017-01-01 | Stop reason: CLARIF

## 2017-01-01 RX ORDER — AMOXICILLIN 250 MG
1-2 CAPSULE ORAL 2 TIMES DAILY PRN
Status: DISCONTINUED | OUTPATIENT
Start: 2017-01-01 | End: 2017-01-01 | Stop reason: HOSPADM

## 2017-01-01 RX ORDER — NALOXONE HYDROCHLORIDE 0.4 MG/ML
.1-.4 INJECTION, SOLUTION INTRAMUSCULAR; INTRAVENOUS; SUBCUTANEOUS
Status: DISCONTINUED | OUTPATIENT
Start: 2017-01-01 | End: 2017-01-01 | Stop reason: HOSPADM

## 2017-01-01 RX ORDER — FUROSEMIDE 20 MG
20 TABLET ORAL DAILY
Qty: 30 TABLET | Refills: 1 | Status: SHIPPED | OUTPATIENT
Start: 2017-01-01

## 2017-01-01 RX ORDER — POTASSIUM CHLORIDE 7.45 MG/ML
10 INJECTION INTRAVENOUS
Status: DISCONTINUED | OUTPATIENT
Start: 2017-01-01 | End: 2017-01-01 | Stop reason: HOSPADM

## 2017-01-01 RX ORDER — CYCLOSPORINE 25 MG/1
50 CAPSULE ORAL EVERY MORNING
Status: DISCONTINUED | OUTPATIENT
Start: 2017-01-01 | End: 2017-01-01

## 2017-01-01 RX ORDER — CYCLOSPORINE 25 MG/1
50 CAPSULE ORAL EVERY MORNING
Status: DISCONTINUED | OUTPATIENT
Start: 2017-01-01 | End: 2017-01-01 | Stop reason: HOSPADM

## 2017-01-01 RX ORDER — PREDNISONE 5 MG/1
5 TABLET ORAL DAILY
Status: DISCONTINUED | OUTPATIENT
Start: 2017-01-01 | End: 2017-01-01 | Stop reason: HOSPADM

## 2017-01-01 RX ORDER — SPIRONOLACTONE 50 MG/1
50 TABLET, FILM COATED ORAL DAILY
Qty: 30 TABLET | Refills: 1 | Status: SHIPPED | OUTPATIENT
Start: 2017-01-01

## 2017-01-01 RX ORDER — CALCIUM CARBONATE 500 MG/1
500-1000 TABLET, CHEWABLE ORAL
Status: DISCONTINUED | OUTPATIENT
Start: 2017-01-01 | End: 2017-01-01 | Stop reason: HOSPADM

## 2017-01-01 RX ORDER — ASPIRIN 81 MG/1
81 TABLET ORAL DAILY
Status: DISCONTINUED | OUTPATIENT
Start: 2017-01-01 | End: 2017-01-01 | Stop reason: CLARIF

## 2017-01-01 RX ORDER — LIDOCAINE HYDROCHLORIDE 10 MG/ML
10 INJECTION, SOLUTION EPIDURAL; INFILTRATION; INTRACAUDAL; PERINEURAL ONCE
Status: COMPLETED | OUTPATIENT
Start: 2017-01-01 | End: 2017-01-01

## 2017-01-01 RX ORDER — LEVOFLOXACIN 500 MG/1
500 TABLET, FILM COATED ORAL DAILY
Qty: 10 TABLET | Refills: 0 | Status: SHIPPED | OUTPATIENT
Start: 2017-01-01 | End: 2017-01-01

## 2017-01-01 RX ORDER — SODIUM CHLORIDE 9 MG/ML
INJECTION, SOLUTION INTRAVENOUS CONTINUOUS
Status: DISCONTINUED | OUTPATIENT
Start: 2017-01-01 | End: 2017-01-01 | Stop reason: CLARIF

## 2017-01-01 RX ORDER — MYCOPHENOLATE MOFETIL 250 MG/1
1000 CAPSULE ORAL 2 TIMES DAILY
Status: DISCONTINUED | OUTPATIENT
Start: 2017-01-01 | End: 2017-01-01 | Stop reason: CLARIF

## 2017-01-01 RX ORDER — ONDANSETRON 2 MG/ML
4 INJECTION INTRAMUSCULAR; INTRAVENOUS EVERY 6 HOURS PRN
Status: DISCONTINUED | OUTPATIENT
Start: 2017-01-01 | End: 2017-01-01 | Stop reason: HOSPADM

## 2017-01-01 RX ORDER — DILTIAZEM HYDROCHLORIDE 120 MG/1
120 CAPSULE, COATED, EXTENDED RELEASE ORAL DAILY
Status: DISCONTINUED | OUTPATIENT
Start: 2017-01-01 | End: 2017-01-01 | Stop reason: HOSPADM

## 2017-01-01 RX ORDER — PANTOPRAZOLE SODIUM 40 MG/1
40 TABLET, DELAYED RELEASE ORAL EVERY MORNING
Status: DISCONTINUED | OUTPATIENT
Start: 2017-01-01 | End: 2017-01-01 | Stop reason: HOSPADM

## 2017-01-01 RX ORDER — SIMETHICONE 80 MG
80 TABLET,CHEWABLE ORAL EVERY 6 HOURS PRN
Status: DISCONTINUED | OUTPATIENT
Start: 2017-01-01 | End: 2017-01-01 | Stop reason: HOSPADM

## 2017-01-01 RX ORDER — POTASSIUM CHLORIDE 1500 MG/1
20-40 TABLET, EXTENDED RELEASE ORAL
Status: DISCONTINUED | OUTPATIENT
Start: 2017-01-01 | End: 2017-01-01 | Stop reason: HOSPADM

## 2017-01-01 RX ORDER — ONDANSETRON 4 MG/1
4 TABLET, ORALLY DISINTEGRATING ORAL EVERY 6 HOURS PRN
Status: DISCONTINUED | OUTPATIENT
Start: 2017-01-01 | End: 2017-01-01 | Stop reason: HOSPADM

## 2017-01-01 RX ORDER — CYCLOSPORINE 25 MG/1
25 CAPSULE ORAL EVERY EVENING
Status: DISCONTINUED | OUTPATIENT
Start: 2017-01-01 | End: 2017-01-01 | Stop reason: HOSPADM

## 2017-01-01 RX ORDER — ACETAMINOPHEN 325 MG/1
650 TABLET ORAL EVERY 4 HOURS PRN
Status: DISCONTINUED | OUTPATIENT
Start: 2017-01-01 | End: 2017-01-01 | Stop reason: HOSPADM

## 2017-01-01 RX ADMIN — ACETAMINOPHEN 500 MG: 500 TABLET, FILM COATED ORAL at 21:34

## 2017-01-01 RX ADMIN — METOPROLOL TARTRATE 12.5 MG: 25 TABLET, FILM COATED ORAL at 20:14

## 2017-01-01 RX ADMIN — DILTIAZEM HYDROCHLORIDE 120 MG: 120 CAPSULE, EXTENDED RELEASE ORAL at 08:41

## 2017-01-01 RX ADMIN — TAZOBACTAM SODIUM AND PIPERACILLIN SODIUM 3.38 G: 375; 3 INJECTION, SOLUTION INTRAVENOUS at 03:41

## 2017-01-01 RX ADMIN — DILTIAZEM HYDROCHLORIDE 120 MG: 120 CAPSULE, EXTENDED RELEASE ORAL at 09:34

## 2017-01-01 RX ADMIN — CYCLOSPORINE 50 MG: 25 CAPSULE ORAL at 09:34

## 2017-01-01 RX ADMIN — ACETAMINOPHEN 650 MG: 325 TABLET, FILM COATED ORAL at 02:04

## 2017-01-01 RX ADMIN — METOPROLOL TARTRATE 12.5 MG: 25 TABLET, FILM COATED ORAL at 20:18

## 2017-01-01 RX ADMIN — METOPROLOL TARTRATE 12.5 MG: 25 TABLET, FILM COATED ORAL at 08:31

## 2017-01-01 RX ADMIN — CYCLOSPORINE 50 MG: 25 CAPSULE ORAL at 08:19

## 2017-01-01 RX ADMIN — PANTOPRAZOLE SODIUM 40 MG: 40 TABLET, DELAYED RELEASE ORAL at 09:34

## 2017-01-01 RX ADMIN — PANTOPRAZOLE SODIUM 40 MG: 40 TABLET, DELAYED RELEASE ORAL at 08:31

## 2017-01-01 RX ADMIN — CYCLOSPORINE 25 MG: 25 CAPSULE ORAL at 20:31

## 2017-01-01 RX ADMIN — METOPROLOL TARTRATE 12.5 MG: 25 TABLET, FILM COATED ORAL at 08:41

## 2017-01-01 RX ADMIN — TAZOBACTAM SODIUM AND PIPERACILLIN SODIUM 3.38 G: 375; 3 INJECTION, SOLUTION INTRAVENOUS at 10:28

## 2017-01-01 RX ADMIN — PREDNISONE 5 MG: 5 TABLET ORAL at 08:32

## 2017-01-01 RX ADMIN — DILTIAZEM HYDROCHLORIDE 120 MG: 120 CAPSULE, EXTENDED RELEASE ORAL at 08:31

## 2017-01-01 RX ADMIN — TAZOBACTAM SODIUM AND PIPERACILLIN SODIUM 3.38 G: 375; 3 INJECTION, SOLUTION INTRAVENOUS at 22:19

## 2017-01-01 RX ADMIN — TAZOBACTAM SODIUM AND PIPERACILLIN SODIUM 3.38 G: 375; 3 INJECTION, SOLUTION INTRAVENOUS at 09:15

## 2017-01-01 RX ADMIN — CYCLOSPORINE 50 MG: 25 CAPSULE ORAL at 08:31

## 2017-01-01 RX ADMIN — TAZOBACTAM SODIUM AND PIPERACILLIN SODIUM 3.38 G: 375; 3 INJECTION, SOLUTION INTRAVENOUS at 09:43

## 2017-01-01 RX ADMIN — CYCLOSPORINE 25 MG: 25 CAPSULE ORAL at 20:18

## 2017-01-01 RX ADMIN — TAZOBACTAM SODIUM AND PIPERACILLIN SODIUM 3.38 G: 375; 3 INJECTION, SOLUTION INTRAVENOUS at 16:00

## 2017-01-01 RX ADMIN — TAZOBACTAM SODIUM AND PIPERACILLIN SODIUM 3.38 G: 375; 3 INJECTION, SOLUTION INTRAVENOUS at 04:15

## 2017-01-01 RX ADMIN — METOPROLOL TARTRATE 12.5 MG: 25 TABLET, FILM COATED ORAL at 08:20

## 2017-01-01 RX ADMIN — ACETAMINOPHEN 650 MG: 325 TABLET, FILM COATED ORAL at 08:31

## 2017-01-01 RX ADMIN — ACETAMINOPHEN 650 MG: 325 TABLET, FILM COATED ORAL at 16:37

## 2017-01-01 RX ADMIN — TAZOBACTAM SODIUM AND PIPERACILLIN SODIUM 3.38 G: 375; 3 INJECTION, SOLUTION INTRAVENOUS at 22:11

## 2017-01-01 RX ADMIN — MYCOPHENOLATE MOFETIL 1000 MG: 250 CAPSULE ORAL at 11:19

## 2017-01-01 RX ADMIN — TAZOBACTAM SODIUM AND PIPERACILLIN SODIUM 3.38 G: 375; 3 INJECTION, SOLUTION INTRAVENOUS at 21:25

## 2017-01-01 RX ADMIN — METOPROLOL TARTRATE 12.5 MG: 25 TABLET, FILM COATED ORAL at 09:34

## 2017-01-01 RX ADMIN — TAZOBACTAM SODIUM AND PIPERACILLIN SODIUM 3.38 G: 375; 3 INJECTION, SOLUTION INTRAVENOUS at 09:39

## 2017-01-01 RX ADMIN — PREDNISONE 5 MG: 5 TABLET ORAL at 08:41

## 2017-01-01 RX ADMIN — CYCLOSPORINE 50 MG: 25 CAPSULE ORAL at 08:41

## 2017-01-01 RX ADMIN — SODIUM CHLORIDE: 9 INJECTION, SOLUTION INTRAVENOUS at 12:36

## 2017-01-01 RX ADMIN — LIDOCAINE HYDROCHLORIDE 100 MG: 10 INJECTION, SOLUTION EPIDURAL; INFILTRATION; INTRACAUDAL; PERINEURAL at 15:39

## 2017-01-01 RX ADMIN — DILTIAZEM HYDROCHLORIDE 120 MG: 120 CAPSULE, EXTENDED RELEASE ORAL at 08:18

## 2017-01-01 RX ADMIN — SIMETHICONE CHEW TAB 80 MG 80 MG: 80 TABLET ORAL at 08:18

## 2017-01-01 RX ADMIN — TAZOBACTAM SODIUM AND PIPERACILLIN SODIUM 3.38 G: 375; 3 INJECTION, SOLUTION INTRAVENOUS at 23:45

## 2017-01-01 RX ADMIN — SODIUM CHLORIDE: 9 INJECTION, SOLUTION INTRAVENOUS at 05:59

## 2017-01-01 RX ADMIN — SIMETHICONE CHEW TAB 80 MG 80 MG: 80 TABLET ORAL at 01:47

## 2017-01-01 RX ADMIN — PREDNISONE 5 MG: 5 TABLET ORAL at 09:34

## 2017-01-01 RX ADMIN — PREDNISONE 5 MG: 5 TABLET ORAL at 08:19

## 2017-01-01 RX ADMIN — ACETAMINOPHEN 500 MG: 500 TABLET, FILM COATED ORAL at 02:06

## 2017-01-01 RX ADMIN — ACETAMINOPHEN 500 MG: 500 TABLET, FILM COATED ORAL at 20:16

## 2017-01-01 RX ADMIN — TAZOBACTAM SODIUM AND PIPERACILLIN SODIUM 3.38 G: 375; 3 INJECTION, SOLUTION INTRAVENOUS at 15:55

## 2017-01-01 RX ADMIN — TAZOBACTAM SODIUM AND PIPERACILLIN SODIUM 3.38 G: 375; 3 INJECTION, SOLUTION INTRAVENOUS at 15:54

## 2017-01-01 RX ADMIN — PANTOPRAZOLE SODIUM 40 MG: 40 TABLET, DELAYED RELEASE ORAL at 01:47

## 2017-01-01 RX ADMIN — PANTOPRAZOLE SODIUM 40 MG: 40 TABLET, DELAYED RELEASE ORAL at 08:20

## 2017-01-01 RX ADMIN — INFLUENZA A VIRUSA/MICHIGAN/45/2015 X-275 (H1N1) ANTIGEN (FORMALDEHYDE INACTIVATED), INFLUENZA A VIRUS A/HONG KONG/4801/2014 X-263B (H3N2) ANTIGEN (FORMALDEHYDE INACTIVATED), AND INFLUENZA B VIRUS B/BRISBANE/60/2008 ANTIGEN (FORMALDEHYDE INACTIVATED) 0.5 ML: 60; 60; 60 INJECTION, SUSPENSION INTRAMUSCULAR at 15:18

## 2017-01-01 RX ADMIN — SODIUM CHLORIDE 1000 ML: 9 INJECTION, SOLUTION INTRAVENOUS at 02:06

## 2017-01-01 RX ADMIN — SODIUM CHLORIDE 1000 ML: 0.9 INJECTION, SOLUTION INTRAVENOUS at 22:48

## 2017-01-01 RX ADMIN — TAZOBACTAM SODIUM AND PIPERACILLIN SODIUM 3.38 G: 375; 3 INJECTION, SOLUTION INTRAVENOUS at 04:23

## 2017-01-01 RX ADMIN — SODIUM CHLORIDE: 9 INJECTION, SOLUTION INTRAVENOUS at 15:54

## 2017-01-01 RX ADMIN — CYCLOSPORINE 25 MG: 25 CAPSULE ORAL at 20:15

## 2017-01-01 RX ADMIN — VANCOMYCIN HYDROCHLORIDE 1750 MG: 10 INJECTION, POWDER, LYOPHILIZED, FOR SOLUTION INTRAVENOUS at 01:08

## 2017-01-01 RX ADMIN — METOPROLOL TARTRATE 12.5 MG: 25 TABLET, FILM COATED ORAL at 20:31

## 2017-01-01 RX ADMIN — PANTOPRAZOLE SODIUM 40 MG: 40 TABLET, DELAYED RELEASE ORAL at 08:41

## 2017-01-01 RX ADMIN — SODIUM CHLORIDE 1000 ML: 9 INJECTION, SOLUTION INTRAVENOUS at 21:34

## 2017-01-01 RX ADMIN — TAZOBACTAM SODIUM AND PIPERACILLIN SODIUM 3.38 G: 375; 3 INJECTION, SOLUTION INTRAVENOUS at 04:16

## 2017-01-01 SDOH — ECONOMIC STABILITY - INCOME SECURITY: PROBLEM RELATED TO HOUSING AND ECONOMIC CIRCUMSTANCES, UNSPECIFIED: Z59.9

## 2017-01-01 ASSESSMENT — PAIN DESCRIPTION - DESCRIPTORS
DESCRIPTORS: HEADACHE
DESCRIPTORS: BURNING

## 2017-01-01 ASSESSMENT — ENCOUNTER SYMPTOMS
NAUSEA: 0
COUGH: 0
CONSTIPATION: 0
FEVER: 1
CHILLS: 1
ABDOMINAL PAIN: 0
DIARRHEA: 1
VOMITING: 0
MYALGIAS: 1

## 2017-01-27 ENCOUNTER — ALLIED HEALTH/NURSE VISIT (OUTPATIENT)
Dept: NURSING | Facility: CLINIC | Age: 67
End: 2017-01-27

## 2017-01-27 VITALS — DIASTOLIC BLOOD PRESSURE: 82 MMHG | SYSTOLIC BLOOD PRESSURE: 122 MMHG

## 2017-01-27 DIAGNOSIS — Z48.298 AFTERCARE FOLLOWING ORGAN TRANSPLANT: ICD-10-CM

## 2017-01-27 DIAGNOSIS — Z23 NEED FOR PROPHYLACTIC VACCINATION AND INOCULATION AGAINST INFLUENZA: Primary | ICD-10-CM

## 2017-01-27 DIAGNOSIS — Z79.899 NEED FOR PROPHYLACTIC CHEMOTHERAPY: ICD-10-CM

## 2017-01-27 DIAGNOSIS — Z94.0 KIDNEY REPLACED BY TRANSPLANT: ICD-10-CM

## 2017-01-27 DIAGNOSIS — Z94.0 KIDNEY TRANSPLANT STATUS, LIVING UNRELATED DONOR: ICD-10-CM

## 2017-01-27 DIAGNOSIS — Z79.52 LONG TERM CURRENT USE OF SYSTEMIC STEROIDS: ICD-10-CM

## 2017-01-27 LAB
ANION GAP SERPL CALCULATED.3IONS-SCNC: 7 MMOL/L (ref 3–14)
BUN SERPL-MCNC: 24 MG/DL (ref 7–30)
CALCIUM SERPL-MCNC: 9 MG/DL (ref 8.5–10.1)
CHLORIDE SERPL-SCNC: 107 MMOL/L (ref 94–109)
CO2 SERPL-SCNC: 25 MMOL/L (ref 20–32)
CREAT SERPL-MCNC: 1.47 MG/DL (ref 0.66–1.25)
ERYTHROCYTE [DISTWIDTH] IN BLOOD BY AUTOMATED COUNT: 13.7 % (ref 10–15)
GFR SERPL CREATININE-BSD FRML MDRD: 48 ML/MIN/1.7M2
GLUCOSE SERPL-MCNC: 112 MG/DL (ref 70–99)
HCT VFR BLD AUTO: 39.9 % (ref 40–53)
HGB BLD-MCNC: 13.5 G/DL (ref 13.3–17.7)
MCH RBC QN AUTO: 34 PG (ref 26.5–33)
MCHC RBC AUTO-ENTMCNC: 33.8 G/DL (ref 31.5–36.5)
MCV RBC AUTO: 101 FL (ref 78–100)
PLATELET # BLD AUTO: 70 10E9/L (ref 150–450)
POTASSIUM SERPL-SCNC: 4 MMOL/L (ref 3.4–5.3)
RBC # BLD AUTO: 3.97 10E12/L (ref 4.4–5.9)
SODIUM SERPL-SCNC: 139 MMOL/L (ref 133–144)
WBC # BLD AUTO: 4.5 10E9/L (ref 4–11)

## 2017-01-27 PROCEDURE — 80048 BASIC METABOLIC PNL TOTAL CA: CPT | Performed by: FAMILY MEDICINE

## 2017-01-27 PROCEDURE — 85027 COMPLETE CBC AUTOMATED: CPT | Performed by: FAMILY MEDICINE

## 2017-01-27 PROCEDURE — 36415 COLL VENOUS BLD VENIPUNCTURE: CPT | Performed by: FAMILY MEDICINE

## 2017-02-28 ENCOUNTER — ALLIED HEALTH/NURSE VISIT (OUTPATIENT)
Dept: NURSING | Facility: CLINIC | Age: 67
End: 2017-02-28
Payer: COMMERCIAL

## 2017-02-28 VITALS — WEIGHT: 192 LBS | BODY MASS INDEX: 36.28 KG/M2 | DIASTOLIC BLOOD PRESSURE: 64 MMHG | SYSTOLIC BLOOD PRESSURE: 106 MMHG

## 2017-02-28 DIAGNOSIS — Z94.0 KIDNEY REPLACED BY TRANSPLANT: ICD-10-CM

## 2017-02-28 DIAGNOSIS — Z79.899 NEED FOR PROPHYLACTIC CHEMOTHERAPY: ICD-10-CM

## 2017-02-28 DIAGNOSIS — Z79.52 LONG TERM CURRENT USE OF SYSTEMIC STEROIDS: ICD-10-CM

## 2017-02-28 DIAGNOSIS — Z94.0 KIDNEY TRANSPLANT STATUS, LIVING UNRELATED DONOR: ICD-10-CM

## 2017-02-28 DIAGNOSIS — Z01.30 BLOOD PRESSURE CHECK: Primary | ICD-10-CM

## 2017-02-28 DIAGNOSIS — Z48.298 AFTERCARE FOLLOWING ORGAN TRANSPLANT: ICD-10-CM

## 2017-02-28 LAB
ANION GAP SERPL CALCULATED.3IONS-SCNC: 10 MMOL/L (ref 3–14)
BUN SERPL-MCNC: 18 MG/DL (ref 7–30)
CALCIUM SERPL-MCNC: 8.8 MG/DL (ref 8.5–10.1)
CHLORIDE SERPL-SCNC: 106 MMOL/L (ref 94–109)
CO2 SERPL-SCNC: 24 MMOL/L (ref 20–32)
CREAT SERPL-MCNC: 1.37 MG/DL (ref 0.66–1.25)
ERYTHROCYTE [DISTWIDTH] IN BLOOD BY AUTOMATED COUNT: 13.3 % (ref 10–15)
GFR SERPL CREATININE-BSD FRML MDRD: 52 ML/MIN/1.7M2
GLUCOSE SERPL-MCNC: 114 MG/DL (ref 70–99)
HCT VFR BLD AUTO: 38.4 % (ref 40–53)
HGB BLD-MCNC: 13 G/DL (ref 13.3–17.7)
MCH RBC QN AUTO: 33.9 PG (ref 26.5–33)
MCHC RBC AUTO-ENTMCNC: 33.9 G/DL (ref 31.5–36.5)
MCV RBC AUTO: 100 FL (ref 78–100)
PLATELET # BLD AUTO: 59 10E9/L (ref 150–450)
POTASSIUM SERPL-SCNC: 4 MMOL/L (ref 3.4–5.3)
RBC # BLD AUTO: 3.84 10E12/L (ref 4.4–5.9)
SODIUM SERPL-SCNC: 140 MMOL/L (ref 133–144)
WBC # BLD AUTO: 4.6 10E9/L (ref 4–11)

## 2017-02-28 PROCEDURE — 80048 BASIC METABOLIC PNL TOTAL CA: CPT | Performed by: FAMILY MEDICINE

## 2017-02-28 PROCEDURE — 99207 ZZC NO CHARGE NURSE ONLY: CPT

## 2017-02-28 PROCEDURE — 85027 COMPLETE CBC AUTOMATED: CPT | Performed by: FAMILY MEDICINE

## 2017-02-28 PROCEDURE — 36415 COLL VENOUS BLD VENIPUNCTURE: CPT | Performed by: FAMILY MEDICINE

## 2017-03-31 ENCOUNTER — ALLIED HEALTH/NURSE VISIT (OUTPATIENT)
Dept: NURSING | Facility: CLINIC | Age: 67
End: 2017-03-31
Payer: COMMERCIAL

## 2017-03-31 VITALS
SYSTOLIC BLOOD PRESSURE: 112 MMHG | WEIGHT: 190.7 LBS | HEART RATE: 64 BPM | DIASTOLIC BLOOD PRESSURE: 80 MMHG | BODY MASS INDEX: 36.03 KG/M2 | TEMPERATURE: 98.2 F

## 2017-03-31 DIAGNOSIS — Z48.298 AFTERCARE FOLLOWING ORGAN TRANSPLANT: ICD-10-CM

## 2017-03-31 DIAGNOSIS — Z94.0 KIDNEY REPLACED BY TRANSPLANT: ICD-10-CM

## 2017-03-31 DIAGNOSIS — Z79.899 NEED FOR PROPHYLACTIC CHEMOTHERAPY: ICD-10-CM

## 2017-03-31 DIAGNOSIS — Z79.52 LONG TERM CURRENT USE OF SYSTEMIC STEROIDS: ICD-10-CM

## 2017-03-31 DIAGNOSIS — Z01.30 BP CHECK: Primary | ICD-10-CM

## 2017-03-31 DIAGNOSIS — Z94.0 KIDNEY TRANSPLANT STATUS, LIVING UNRELATED DONOR: ICD-10-CM

## 2017-03-31 LAB
ANION GAP SERPL CALCULATED.3IONS-SCNC: 6 MMOL/L (ref 3–14)
BUN SERPL-MCNC: 22 MG/DL (ref 7–30)
CALCIUM SERPL-MCNC: 8.9 MG/DL (ref 8.5–10.1)
CHLORIDE SERPL-SCNC: 107 MMOL/L (ref 94–109)
CO2 SERPL-SCNC: 25 MMOL/L (ref 20–32)
CREAT SERPL-MCNC: 1.44 MG/DL (ref 0.66–1.25)
ERYTHROCYTE [DISTWIDTH] IN BLOOD BY AUTOMATED COUNT: 13.5 % (ref 10–15)
GFR SERPL CREATININE-BSD FRML MDRD: 49 ML/MIN/1.7M2
GLUCOSE SERPL-MCNC: 126 MG/DL (ref 70–99)
HCT VFR BLD AUTO: 36.8 % (ref 40–53)
HGB BLD-MCNC: 12.9 G/DL (ref 13.3–17.7)
MCH RBC QN AUTO: 34.2 PG (ref 26.5–33)
MCHC RBC AUTO-ENTMCNC: 35.1 G/DL (ref 31.5–36.5)
MCV RBC AUTO: 98 FL (ref 78–100)
PLATELET # BLD AUTO: 64 10E9/L (ref 150–450)
POTASSIUM SERPL-SCNC: 4.1 MMOL/L (ref 3.4–5.3)
RBC # BLD AUTO: 3.77 10E12/L (ref 4.4–5.9)
SODIUM SERPL-SCNC: 138 MMOL/L (ref 133–144)
WBC # BLD AUTO: 5 10E9/L (ref 4–11)

## 2017-03-31 PROCEDURE — 85027 COMPLETE CBC AUTOMATED: CPT | Performed by: FAMILY MEDICINE

## 2017-03-31 PROCEDURE — 36415 COLL VENOUS BLD VENIPUNCTURE: CPT | Performed by: FAMILY MEDICINE

## 2017-03-31 PROCEDURE — 80048 BASIC METABOLIC PNL TOTAL CA: CPT | Performed by: FAMILY MEDICINE

## 2017-03-31 PROCEDURE — 99207 ZZC NO CHARGE NURSE ONLY: CPT

## 2017-03-31 NOTE — MR AVS SNAPSHOT
After Visit Summary   3/31/2017    Junior Patricia    MRN: [id number]           Patient Information     Date Of Birth          1950        Visit Information        Provider Department      3/31/2017 8:15 AM ALLI TOLBERT MA/FRAN [facility]        Today's Diagnoses     BP check    -  1       Follow-ups after your visit        Your next 10 appointments already scheduled     May 02, 2017  8:00 AM CDT   LAB with CR LAB   [facility] ([facility])    [address] [facility] [address] [phone]           Patient must bring picture ID.  Patient should be prepared to give a urine specimen  Please do not eat 10-12 hours before your appointment if you are coming in fasting for labs on lipids, cholesterol, or glucose (sugar).  Pregnant women should follow their Care Team instructions. Water with medications is okay. Do not drink coffee or other fluids.   If you have concerns about taking  your medications, please ask at office or if scheduling via [employer] Liaison, send a message by clicking on Secure Messaging, Message Your Care Team.            May 02, 2017  8:15 AM CDT   Nurse Only with ALLI GRIFFIN   [facility] ([facility])    [address] [facility] [address] [phone]              Who to contact     If you have questions or need follow up information about today's clinic visit or your schedule please contact [facility] directly at [phone].  Normal or non-critical lab and imaging results will be communicated to you by MyChart, letter or phone within 4 business days after the clinic has received the results. If you do not hear from us within 7 days, please contact the clinic through iMap[employer]hart or phone. If you have a critical or abnormal lab result, we will notify you by phone as soon as possible.  Submit refill requests through [employer] Liaison or call your pharmacy  "and they will forward the refill request to us. Please allow 3 business days for your refill to be completed.          Additional Information About Your Visit        Redbiotechart Information     [employer] lets you send messages to your doctor, view your test results, renew your prescriptions, schedule appointments and more. To sign up, go to [email or web address] . Click on \"Log in\" on the left side of the screen, which will take you to the Welcome page. Then click on \"Sign up Now\" on the right side of the page.     You will be asked to enter the access code listed below, as well as some personal information. Please follow the directions to create your username and password.     Your access code is: [email or web address]  Expires: 2017  9:17 AM     Your access code will  in 90 days. If you need help or a new code, please call your [place] clinic or [phone].        Care EveryWhere ID     This is your [facility] EveryWhere ID. This could be used by other organizations to access your [place] medical records  [id number]        Your Vitals Were     Pulse Temperature BMI (Body Mass Index)             64 98.2  F (36.8  C) (Oral) 36.03 kg/m2          Blood Pressure from Last 3 Encounters:   17 112/80   17 106/64   17 122/82    Weight from Last 3 Encounters:   17 190 lb 11.2 oz (86.5 kg)   17 192 lb (87.1 kg)   16 187 lb (84.8 kg)              Today, you had the following     No orders found for display       Primary Care Provider    Clinic [facility]       No address on file        Thank you!     Thank you for choosing [facility]  for your care. Our goal is always to provide you with excellent care. Hearing back from our patients is one way we can continue to improve our services. Please take a few minutes to complete the written survey that you may receive in the mail after your visit with us. Thank you!             Your Updated Medication List - Protect " others around you: Learn how to safely use, store and throw away your medicines at www.disposemymeds.org.          This list is accurate as of: 3/31/17  8:42 AM.  Always use your most recent med list.                   Brand Name Dispense Instructions for use    ACE/ARB NOT PRESCRIBED (INTENTIONAL)      by Other route continuous prn.       aspirin 81 MG tablet      1 tablet twice weekly       CELLCEPT tablet      Take  by mouth 2 times daily. 2 tabs twice daily       colchicine 0.6 MG tablet    COLCRYS    10 tablet    1 qd       diclofenac 1 % Gel topical gel    VOLTAREN    100 g    Apply  2 grams to hands four times daily using enclosed dosing card.       DILTIAZEM CD CPCR# 120 MG/24HR OR      1 cap daily       GENGRAF 25 MG Caps      6 cap daily       LIPITOR 10 MG tablet   Generic drug:  atorvastatin      Take 10 mg by mouth daily.       metoprolol 25 MG tablet    LOPRESSOR     Take 25 mg by mouth 2 times daily       VITAMIN D PO      Take  by mouth.

## 2017-03-31 NOTE — NURSING NOTE
"Chief Complaint   Patient presents with     Allied Health Visit     vital check       Initial /80 (BP Location: Right arm, Patient Position: Chair, Cuff Size: Adult Regular)  Pulse 64  Temp 98.2  F (36.8  C) (Oral)  Wt 190 lb 11.2 oz (86.5 kg)  BMI 36.03 kg/m2 Estimated body mass index is 36.03 kg/(m^2) as calculated from the following:    Height as of 4/27/15: 5' 1\" (1.549 m).    Weight as of this encounter: 190 lb 11.2 oz (86.5 kg).  BP completed using cuff size: regular    * faxed this over to [facility] as well, Per note in lab encounter *    Gail Chaudhari MA  "

## 2017-04-05 ENCOUNTER — OFFICE VISIT (OUTPATIENT)
Dept: FAMILY MEDICINE | Facility: CLINIC | Age: 67
End: 2017-04-05
Payer: COMMERCIAL

## 2017-04-05 ENCOUNTER — RADIANT APPOINTMENT (OUTPATIENT)
Dept: GENERAL RADIOLOGY | Facility: CLINIC | Age: 67
End: 2017-04-05
Attending: FAMILY MEDICINE
Payer: COMMERCIAL

## 2017-04-05 VITALS
RESPIRATION RATE: 16 BRPM | TEMPERATURE: 97.9 F | HEART RATE: 70 BPM | BODY MASS INDEX: 38.31 KG/M2 | SYSTOLIC BLOOD PRESSURE: 116 MMHG | OXYGEN SATURATION: 97 % | HEIGHT: 60 IN | WEIGHT: 195.1 LBS | DIASTOLIC BLOOD PRESSURE: 73 MMHG

## 2017-04-05 DIAGNOSIS — Z11.59 NEED FOR HEPATITIS C SCREENING TEST: ICD-10-CM

## 2017-04-05 DIAGNOSIS — J45.901 ASTHMA EXACERBATION: ICD-10-CM

## 2017-04-05 DIAGNOSIS — Z94.0 KIDNEY REPLACED BY TRANSPLANT: Primary | ICD-10-CM

## 2017-04-05 DIAGNOSIS — E11.9 TYPE 2 DIABETES MELLITUS WITH HEMOGLOBIN A1C GOAL OF LESS THAN 8.0% (H): ICD-10-CM

## 2017-04-05 LAB
BASOPHILS # BLD AUTO: 0 10E9/L (ref 0–0.2)
BASOPHILS NFR BLD AUTO: 0.3 %
DIFFERENTIAL METHOD BLD: ABNORMAL
EOSINOPHIL # BLD AUTO: 0.1 10E9/L (ref 0–0.7)
EOSINOPHIL NFR BLD AUTO: 2.1 %
ERYTHROCYTE [DISTWIDTH] IN BLOOD BY AUTOMATED COUNT: 13.5 % (ref 10–15)
HBA1C MFR BLD: 7 % (ref 4.3–6)
HCT VFR BLD AUTO: 35.8 % (ref 40–53)
HGB BLD-MCNC: 12.1 G/DL (ref 13.3–17.7)
LYMPHOCYTES # BLD AUTO: 0.7 10E9/L (ref 0.8–5.3)
LYMPHOCYTES NFR BLD AUTO: 17.9 %
MCH RBC QN AUTO: 33.6 PG (ref 26.5–33)
MCHC RBC AUTO-ENTMCNC: 33.8 G/DL (ref 31.5–36.5)
MCV RBC AUTO: 99 FL (ref 78–100)
MONOCYTES # BLD AUTO: 0.5 10E9/L (ref 0–1.3)
MONOCYTES NFR BLD AUTO: 13.4 %
NEUTROPHILS # BLD AUTO: 2.5 10E9/L (ref 1.6–8.3)
NEUTROPHILS NFR BLD AUTO: 66.3 %
PLATELET # BLD AUTO: 55 10E9/L (ref 150–450)
RBC # BLD AUTO: 3.6 10E12/L (ref 4.4–5.9)
WBC # BLD AUTO: 3.8 10E9/L (ref 4–11)

## 2017-04-05 PROCEDURE — 71020 XR CHEST 2 VW: CPT

## 2017-04-05 PROCEDURE — 82043 UR ALBUMIN QUANTITATIVE: CPT | Performed by: FAMILY MEDICINE

## 2017-04-05 PROCEDURE — 86803 HEPATITIS C AB TEST: CPT | Performed by: FAMILY MEDICINE

## 2017-04-05 PROCEDURE — 85025 COMPLETE CBC W/AUTO DIFF WBC: CPT | Performed by: FAMILY MEDICINE

## 2017-04-05 PROCEDURE — 36415 COLL VENOUS BLD VENIPUNCTURE: CPT | Performed by: FAMILY MEDICINE

## 2017-04-05 PROCEDURE — 94640 AIRWAY INHALATION TREATMENT: CPT | Performed by: FAMILY MEDICINE

## 2017-04-05 PROCEDURE — 83721 ASSAY OF BLOOD LIPOPROTEIN: CPT | Performed by: FAMILY MEDICINE

## 2017-04-05 PROCEDURE — 83036 HEMOGLOBIN GLYCOSYLATED A1C: CPT | Performed by: FAMILY MEDICINE

## 2017-04-05 PROCEDURE — 84443 ASSAY THYROID STIM HORMONE: CPT | Performed by: FAMILY MEDICINE

## 2017-04-05 PROCEDURE — 99213 OFFICE O/P EST LOW 20 MIN: CPT | Mod: 25 | Performed by: FAMILY MEDICINE

## 2017-04-05 RX ORDER — DOXYCYCLINE 100 MG/1
100 TABLET ORAL 2 TIMES DAILY
Qty: 20 TABLET | Refills: 0 | Status: SHIPPED | OUTPATIENT
Start: 2017-04-05 | End: 2017-04-15

## 2017-04-05 RX ORDER — ALBUTEROL SULFATE 0.83 MG/ML
1 SOLUTION RESPIRATORY (INHALATION) ONCE
Qty: 3 ML | Refills: 0
Start: 2017-04-05 | End: 2017-04-05

## 2017-04-05 RX ORDER — ALBUTEROL SULFATE 90 UG/1
2 AEROSOL, METERED RESPIRATORY (INHALATION) EVERY 6 HOURS PRN
Qty: 1 INHALER | Refills: 0 | Status: SHIPPED | OUTPATIENT
Start: 2017-04-05 | End: 2017-08-04

## 2017-04-05 RX ORDER — PREDNISONE 20 MG/1
40 TABLET ORAL DAILY
Qty: 10 TABLET | Refills: 0 | Status: SHIPPED | OUTPATIENT
Start: 2017-04-05 | End: 2017-04-10

## 2017-04-05 NOTE — MR AVS SNAPSHOT
After Visit Summary   4/5/2017    Junior Patriica    MRN: [id number]           Patient Information     Date Of Birth          1950        Visit Information        Provider Department      4/5/2017 11:15 AM Andreas Mobley MD [facility]        Today's Diagnoses     Kidney replaced by transplant    -  1    Type 2 diabetes mellitus with hemoglobin A1c goal of less than 8.0% (H)        Need for hepatitis C screening test        Asthma exacerbation           Follow-ups after your visit        Your next 10 appointments already scheduled     May 02, 2017  8:00 AM CDT   LAB with CR LAB   [facility] ([facility])    [address] [facility] [address] [phone]           Patient must bring picture ID.  Patient should be prepared to give a urine specimen  Please do not eat 10-12 hours before your appointment if you are coming in fasting for labs on lipids, cholesterol, or glucose (sugar).  Pregnant women should follow their Care Team instructions. Water with medications is okay. Do not drink coffee or other fluids.   If you have concerns about taking  your medications, please ask at office or if scheduling via [employer], send a message by clicking on Secure Messaging, Message Your Care Team.            May 02, 2017  8:15 AM CDT   Nurse Only with CR BRONZE MA/LPN   [facility] ([facility])    [id number] [place] [address] [phone]              Who to contact     If you have questions or need follow up information about today's clinic visit or your schedule please contact [facility] directly at [phone].  Normal or non-critical lab and imaging results will be communicated to you by MyChart, letter or phone within 4 business days after the clinic has received the results. If you do not hear from us within 7 days, please contact the clinic  "through [employer] or phone. If you have a critical or abnormal lab result, we will notify you by phone as soon as possible.  Submit refill requests through [employer] or call your pharmacy and they will forward the refill request to us. Please allow 3 business days for your refill to be completed.          Additional Information About Your Visit        [employer] Information     [employer] lets you send messages to your doctor, view your test results, renew your prescriptions, schedule appointments and more. To sign up, go to [email or web address] . Click on \"Log in\" on the left side of the screen, which will take you to the Welcome page. Then click on \"Sign up Now\" on the right side of the page.     You will be asked to enter the access code listed below, as well as some personal information. Please follow the directions to create your username and password.     Your access code is: [email or web address]  Expires: 2017  9:17 AM     Your access code will  in 90 days. If you need help or a new code, please call your [place] clinic or [phone].        Care EveryWhere ID     This is your Care EveryWhere ID. This could be used by other organizations to access your [place] medical records  [id number]        Your Vitals Were     Pulse Temperature Respirations Height Pulse Oximetry BMI (Body Mass Index)    70 97.9  F (36.6  C) (Oral) 16 4' 11.5\" (1.511 m) 97% 38.75 kg/m2       Blood Pressure from Last 3 Encounters:   17 116/73   17 112/80   17 106/64    Weight from Last 3 Encounters:   17 195 lb 1.6 oz (88.5 kg)   17 190 lb 11.2 oz (86.5 kg)   17 192 lb (87.1 kg)              We Performed the Following     Albumin Random Urine Quantitative     CBC with platelets and differential     Hemoglobin A1c     Hepatitis C Screen Reflex to HCV RNA Quant and Genotype     INHALATION/NEBULIZER TREATMENT, INITIAL     LDL cholesterol direct     TSH     XR Chest 2 Views          Today's Medication " Changes          These changes are accurate as of: 4/5/17 12:21 PM.  If you have any questions, ask your nurse or doctor.               Start taking these medicines.        Dose/Directions    * albuterol (2.5 MG/3ML) 0.083% neb solution   Used for:  Asthma exacerbation   Started by:  Andreas Mobley MD        Dose:  1 vial   Take 1 vial (2.5 mg) by nebulization once for 1 dose   Quantity:  3 mL   Refills:  0       * albuterol 108 (90 BASE) MCG/ACT Inhaler   Commonly known as:  PROAIR HFA/PROVENTIL HFA/VENTOLIN HFA   Used for:  Asthma exacerbation   Started by:  Andreas Mobley MD        Dose:  2 puff   Inhale 2 puffs into the lungs every 6 hours as needed for shortness of breath / dyspnea or wheezing   Quantity:  1 Inhaler   Refills:  0       doxycycline Monohydrate 100 MG Tabs   Used for:  Asthma exacerbation   Started by:  Andreas Mobley MD        Dose:  100 mg   Take 100 mg by mouth 2 times daily for 10 days   Quantity:  20 tablet   Refills:  0       predniSONE 20 MG tablet   Commonly known as:  DELTASONE   Used for:  Asthma exacerbation   Started by:  Andreas Mobley MD        Dose:  40 mg   Take 2 tablets (40 mg) by mouth daily for 5 days   Quantity:  10 tablet   Refills:  0       * Notice:  This list has 2 medication(s) that are the same as other medications prescribed for you. Read the directions carefully, and ask your doctor or other care provider to review them with you.         Where to get your medicines      These medications were sent to [facility] [address] [place] [address]  [id number] [place] [id number]    Hours:  24-hours Phone:  [phone]     albuterol 108 (90 BASE) MCG/ACT Inhaler    doxycycline Monohydrate 100 MG Tabs    predniSONE 20 MG tablet         Some of these will need a paper prescription and others can be bought over the counter.  Ask your nurse if you have questions.     You  don't need a prescription for these medications     albuterol (2.5 MG/3ML) 0.083% neb solution                Primary Care Provider    Clinic [facility]       No address on file        Thank you!     Thank you for choosing [facility]  for your care. Our goal is always to provide you with excellent care. Hearing back from our patients is one way we can continue to improve our services. Please take a few minutes to complete the written survey that you may receive in the mail after your visit with us. Thank you!             Your Updated Medication List - Protect others around you: Learn how to safely use, store and throw away your medicines at www.disposemymeds.org.          This list is accurate as of: 4/5/17 12:21 PM.  Always use your most recent med list.                   Brand Name Dispense Instructions for use    ACE/ARB NOT PRESCRIBED (INTENTIONAL)      by Other route continuous prn.       * albuterol (2.5 MG/3ML) 0.083% neb solution     3 mL    Take 1 vial (2.5 mg) by nebulization once for 1 dose       * albuterol 108 (90 BASE) MCG/ACT Inhaler    PROAIR HFA/PROVENTIL HFA/VENTOLIN HFA    1 Inhaler    Inhale 2 puffs into the lungs every 6 hours as needed for shortness of breath / dyspnea or wheezing       aspirin 81 MG tablet      1 tablet twice weekly       CELLCEPT tablet      Take  by mouth 2 times daily. 2 tabs twice daily       colchicine 0.6 MG tablet    COLCRYS    10 tablet    1 qd       diclofenac 1 % Gel topical gel    VOLTAREN    100 g    Apply  2 grams to hands four times daily using enclosed dosing card.       DILTIAZEM CD CPCR# 120 MG/24HR OR      1 cap daily       doxycycline Monohydrate 100 MG Tabs     20 tablet    Take 100 mg by mouth 2 times daily for 10 days       GENGRAF 25 MG Caps      6 cap daily       LIPITOR 10 MG tablet   Generic drug:  atorvastatin      Take 10 mg by mouth daily.       metoprolol 25 MG tablet    LOPRESSOR     Take 25 mg by mouth 2 times daily        predniSONE 20 MG tablet    DELTASONE    10 tablet    Take 2 tablets (40 mg) by mouth daily for 5 days       VITAMIN D PO      Take  by mouth.       * Notice:  This list has 2 medication(s) that are the same as other medications prescribed for you. Read the directions carefully, and ask your doctor or other care provider to review them with you.

## 2017-04-05 NOTE — LETTER
[facility]  [address] [place], [address]  [phone]    April 10, 2017       Juniorlucio Patricia                                                                                                                                          [address] [facility] [address]      Dear Junior:    The results of your latest lipid tests as attached.    Results for orders placed or performed in visit on 04/05/17   XR Chest 2 Views    Narrative    XR CHEST 2 VW   4/5/2017 11:54 AM     HISTORY: Unspecified asthma with (acute) exacerbation    COMPARISON: None.      Impression    IMPRESSION: Scarring/atelectasis in the lingula.    JENNI GLASER MD   CBC with platelets and differential   Result Value Ref Range    WBC 3.8 (L) 4.0 - 11.0 10e9/L    RBC Count 3.60 (L) 4.4 - 5.9 10e12/L    Hemoglobin 12.1 (L) 13.3 - 17.7 g/dL    Hematocrit 35.8 (L) 40.0 - 53.0 %    MCV 99 78 - 100 fl    MCH 33.6 (H) 26.5 - 33.0 pg    MCHC 33.8 31.5 - 36.5 g/dL    RDW 13.5 10.0 - 15.0 %    Platelet Count 55 (L) 150 - 450 10e9/L    Diff Method Automated Method     % Neutrophils 66.3 %    % Lymphocytes 17.9 %    % Monocytes 13.4 %    % Eosinophils 2.1 %    % Basophils 0.3 %    Absolute Neutrophil 2.5 1.6 - 8.3 10e9/L    Absolute Lymphocytes 0.7 (L) 0.8 - 5.3 10e9/L    Absolute Monocytes 0.5 0.0 - 1.3 10e9/L    Absolute Eosinophils 0.1 0.0 - 0.7 10e9/L    Absolute Basophils 0.0 0.0 - 0.2 10e9/L   Hemoglobin A1c   Result Value Ref Range    Hemoglobin A1C 7.0 (H) 4.3 - 6.0 %   TSH   Result Value Ref Range    TSH 2.29 0.40 - 4.00 mU/L   LDL cholesterol direct   Result Value Ref Range    LDL Cholesterol Direct 66 <100 mg/dL   Albumin Random Urine Quantitative   Result Value Ref Range    Creatinine Urine 177 mg/dL    Albumin Urine mg/L 19 mg/L    Albumin Urine mg/g Cr 10.85 0 - 17 mg/g Cr   Hepatitis C Screen Reflex to HCV RNA Quant and Genotype   Result Value Ref Range    Hepatitis C Antibody  NR     Nonreactive   Assay  "performance characteristics have not been established for newborns,   infants, and children       LDL is the \"bad\" cholesterol linked to heart disease and stroke.   HDL is the \"good\" cholesterol and when it is high, it decreases the risk for above problems.    Follow a low-fat, low-cholesterol diet and get regular exercise.  Please feel free to call the clinic at [phone] if you have any questions.    Sincerely,    Andreas Mobley MD    "

## 2017-04-05 NOTE — PROGRESS NOTES
SUBJECTIVE:                                                    Junior Patricia is a 66 year old male who presents to clinic today for the following health issues:      RESPIRATORY SYMPTOMS      Duration: 4 days    Description  rhinorrhea, cough, headache and hard to breathe    Severity: severe    Accompanying signs and symptoms: None    History (predisposing factors):  none    Precipitating or alleviating factors: None    Therapies tried and outcome:  none       SUBJECTIVE:   Junior Patricia is a 66 year old male seen urgently with exacerbation of asthma for 7 days. Wheezing is described as moderate. Associated symptoms:coryza, irritability and decreased appetite. Current asthma medications: none. Patient does not smoke cigarettes.    OBJECTIVE:   The patient appears in no respiratory distress and acyanotic.  ENT: ENT exam normal, no neck nodes or sinus tenderness, neck without nodes, pharynx erythematous without exudate, sinuses nontender and post nasal drip noted  CHEST:chest clear to IPPA, no tachypnea, retractions or cyanosis, generalized expiratory wheezing heard both lower lobes and S1, S2 normal, no murmur, no gallop, rate regular    ASSESSMENT:   Asthma - acute exacerbation    PLAN:   oximetry on room air was 98%    RX per orders - Use bronchodilator MDI 2 puff q4h prn, steroid MDI regularly to prevent asthma and oral steroid taper.  Current Outpatient Prescriptions   Medication     albuterol (2.5 MG/3ML) 0.083% neb solution     doxycycline Monohydrate 100 MG TABS     albuterol (PROAIR HFA/PROVENTIL HFA/VENTOLIN HFA) 108 (90 BASE) MCG/ACT Inhaler     predniSONE (DELTASONE) 20 MG tablet     diclofenac (VOLTAREN) 1 % GEL     metoprolol (LOPRESSOR) 25 MG tablet     colchicine (COLCRYS) 0.6 MG tablet     atorvastatin (LIPITOR) 10 MG tablet     ACE/ARB NOT PRESCRIBED, INTENTIONAL,     mycophenolate (CELLCEPT) 500 MG tablet     VITAMIN D PO     DILTIAZEM CD CPCR 120 MG/24HR OR     ASPIRIN 81 MG OR TABS     GENGRAF 25  MG OR CAPS     No current facility-administered medications for this visit.        Additional suggestions to patient: push fluids, rest, use SVN machine q4h prn and MDI and antibiotic at home  Chest xray is negative .

## 2017-04-05 NOTE — NURSING NOTE
"Chief Complaint   Patient presents with     URI       Initial /73 (BP Location: Right arm, Patient Position: Chair, Cuff Size: Adult Large)  Pulse 70  Temp 97.9  F (36.6  C) (Oral)  Resp 16  Ht 4' 11.5\" (1.511 m)  Wt 195 lb 1.6 oz (88.5 kg)  SpO2 97%  BMI 38.75 kg/m2 Estimated body mass index is 38.75 kg/(m^2) as calculated from the following:    Height as of this encounter: 4' 11.5\" (1.511 m).    Weight as of this encounter: 195 lb 1.6 oz (88.5 kg).  Medication Reconciliation: complete   Bo Pickett CMA       "

## 2017-04-06 LAB
CREAT UR-MCNC: 177 MG/DL
HCV AB SERPL QL IA: NORMAL
LDLC SERPL DIRECT ASSAY-MCNC: 66 MG/DL
MICROALBUMIN UR-MCNC: 19 MG/L
MICROALBUMIN/CREAT UR: 10.85 MG/G CR (ref 0–17)
TSH SERPL DL<=0.05 MIU/L-ACNC: 2.29 MU/L (ref 0.4–4)

## 2017-04-06 ASSESSMENT — PATIENT HEALTH QUESTIONNAIRE - PHQ9: SUM OF ALL RESPONSES TO PHQ QUESTIONS 1-9: 14

## 2017-04-10 ENCOUNTER — TELEPHONE (OUTPATIENT)
Dept: FAMILY MEDICINE | Facility: CLINIC | Age: 67
End: 2017-04-10

## 2017-04-10 NOTE — LETTER
[address] [id number] [phone]  April 10, 2017    Juniorlucio Jasso Harshad  [address] [place] [address]    Dear Junior,    I care about your health and have reviewed your health plan. I have reviewed your medical conditions, medication list, and lab results and am making recommendations based on this review, to better manage your health.    You are in particular need of attention regarding:  -Asthma    I am recommending that you:   -Complete and return the attached ASTHMA CONTROL TEST.  If your total score is 19 or less or you have been to the ER or urgent care for your asthma, then please schedule an asthma followup appointment.    Here is a list of Health Maintenance topics that are due now or due soon:  Health Maintenance Due   Topic Date Due     FOOT EXAM Q1 YEAR( NO INBASKET)  05/15/1951     EYE EXAM Q1 YEAR( NO INBASKET)  05/15/1951     ADVANCE DIRECTIVE PLANNING Q5 YRS (NO INBASKET)  05/15/1969     PNEUMOCOCCAL (1 of 2 - PCV13) 05/15/2016       Please call us at [phone] (or use [employer]borders) to address the above recommendations.     Thank you for trusting [facility] and we appreciate the opportunity to serve you.  We look forward to supporting your healthcare needs in the future.    Healthy Regards,    Andreas Mobley MD  [id number]

## 2017-04-10 NOTE — LETTER
My Asthma Action Plan  Name: Junior Patricia   YOB: 1950  Date: 4/11/2017   My doctor: Andreas Mobley MD   My clinic: [facility]        My Control Medicine: no  My Rescue Medicine: none   My Asthma Severity: intermittent  Avoid your asthma triggers: upper respiratory infections, dust mites and pollens               GREEN ZONE     Good Control    I feel good    No cough or wheeze    Can work, sleep and play without asthma symptoms       Take your asthma control medicine every day.     1. If exercise triggers your asthma, take your rescue medication    15 minutes before exercise or sports, and    During exercise if you have asthma symptoms  2. Spacer to use with inhaler: If you have a spacer, make sure to use it with your inhaler             YELLOW ZONE     Getting Worse  I have ANY of these:    I do not feel good    Cough or wheeze    Chest feels tight    Wake up at night   1. Keep taking your Green Zone medications  2. Start taking your rescue medicine:    every 20 minutes for up to 1 hour. Then every 4 hours for 24-48 hours.  3. If you stay in the Yellow Zone for more than 12-24 hours, contact your doctor.  4. If you do not return to the Green Zone in 12-24 hours or you get worse, start taking your oral steroid medicine if prescribed by your provider.           RED ZONE     Medical Alert - Get Help  I have ANY of these:    I feel awful    Medicine is not helping    Breathing getting harder    Trouble walking or talking    Nose opens wide to breathe       1. Take your rescue medicine NOW  2. If your provider has prescribed an oral steroid medicine, start taking it NOW  3. Call your doctor NOW  4. If you are still in the Red Zone after 20 minutes and you have not reached your doctor:    Take your rescue medicine again and    Call 911 or go to the emergency room right away    See your regular doctor within 2 weeks of an Emergency Room or Urgent Care visit for follow-up  treatment.        Electronically signed by: Andreas Mobley, April 11, 2017    Annual Reminders:  Meet with Asthma Educator,  Flu Shot in the Fall, consider Pneumonia Vaccination for patients with asthma (aged 19 and older).    Pharmacy:    [facility] - [place], [place] - [address] [facility] [address] [facility] [address] AT [place] [address]                    Asthma Triggers  How To Control Things That Make Your Asthma Worse    Triggers are things that make your asthma worse.  Look at the list below to help you find your triggers and what you can do about them.  You can help prevent asthma flare-ups by staying away from your triggers.      Trigger                                                          What you can do   Cigarette Smoke  Tobacco smoke can make asthma worse. Do not allow smoking in your home, car or around you.  Be sure no one smokes at a child s day care or school.  If you smoke, ask your health care provider for ways to help you quit.  Ask family members to quit too.  Ask your health care provider for a referral to Quit Plan to help you quit smoking, or call 3-758-677-PLAN.     Colds, Flu, Bronchitis  These are common triggers of asthma. Wash your hands often.  Don t touch your eyes, nose or mouth.  Get a flu shot every year.     Dust Mites  These are tiny bugs that live in cloth or carpet. They are too small to see. Wash sheets and blankets in hot water every week.   Encase pillows and mattress in dust mite proof covers.  Avoid having carpet if you can. If you have carpet, vacuum weekly.   Use a dust mask and HEPA vacuum.   Pollen and Outdoor Mold  Some people are allergic to trees, grass, or weed pollen, or molds. Try to keep your windows closed.  Limit time out doors when pollen count is high.   Ask you health care provider about taking medicine during allergy season.     Animal Dander  Some people are allergic to  skin flakes, urine or saliva from pets with fur or feathers. Keep pets with fur or feathers out of your home.    If you can t keep the pet outdoors, then keep the pet out of your bedroom.  Keep the bedroom door closed.  Keep pets off cloth furniture and away from stuffed toys.     Mice, Rats, and Cockroaches  Some people are allergic to the waste from these pests.   Cover food and garbage.  Clean up spills and food crumbs.  Store grease in the refrigerator.   Keep food out of the bedroom.   Indoor Mold  This can be a trigger if your home has high moisture. Fix leaking faucets, pipes, or other sources of water.   Clean moldy surfaces.  Dehumidify basement if it is damp and smelly.   Smoke, Strong Odors, and Sprays  These can reduce air quality. Stay away from strong odors and sprays, such as perfume, powder, hair spray, paints, smoke incense, paint, cleaning products, candles and new carpet.   Exercise or Sports  Some people with asthma have this trigger. Be active!  Ask your doctor about taking medicine before sports or exercise to prevent symptoms.    Warm up for 5-10 minutes before and after sports or exercise.     Other Triggers of Asthma  Cold air:  Cover your nose and mouth with a scarf.  Sometimes laughing or crying can be a trigger.  Some medicines and food can trigger asthma.

## 2017-04-11 PROBLEM — J45.901 ASTHMA EXACERBATION: Status: ACTIVE | Noted: 2017-04-11

## 2017-04-11 NOTE — TELEPHONE ENCOUNTER
Panel Management Review      Patient has the following on his problem list:     Asthma review   No flowsheet data found.   1. Is Asthma diagnosis on the Problem List? No   2. Is Asthma listed on Health Maintenance? No   3. Patient is due for:  ACT and AAP    Diabetes    ASA: Passed    Last A1C  Lab Results   Component Value Date    A1C 7.0 04/05/2017    A1C 6.7 08/04/2014    A1C 6.8 05/02/2012    A1C 6.3 03/15/2006    A1C 7.1 02/02/2006     A1C tested: Passed    Last LDL:    Lab Results   Component Value Date    CHOL 186 05/10/2011     Lab Results   Component Value Date    HDL 40 05/10/2011     Lab Results   Component Value Date    LDL 66 04/05/2017     05/10/2011     Lab Results   Component Value Date    TRIG 137 05/10/2011     Lab Results   Component Value Date    CHOLHDLRATIO 4.7 05/10/2011     No results found for: NHDL    Is the patient on a Statin? YES             Is the patient on Aspirin? YES    Medications     HMG CoA Reductase Inhibitors    atorvastatin (LIPITOR) 10 MG tablet    Salicylates    ASPIRIN 81 MG OR TABS          Last three blood pressure readings:  BP Readings from Last 3 Encounters:   04/05/17 116/73   03/31/17 112/80   02/28/17 106/64       Date of last diabetes office visit: 04/05/2017     Tobacco History:     History   Smoking Status     Never Smoker   Smokeless Tobacco     Never Used           Composite cancer screening  Chart review shows that this patient is due/due soon for the following None  Summary:  Needs asthma added to Problem List, AAP and ACT  Patient is due/failing the following:       Action needed:   Routed to provider for review and letter sent to patient with ACT.    Type of outreach:    None, routed to provider for review.    Questions for provider review:    Dr. Mobley- Please add asthma to problem list.                                                                                                                                    Felipa Rivas [facility]       Chart  routed to Provider .

## 2017-04-18 ENCOUNTER — TELEPHONE (OUTPATIENT)
Dept: FAMILY MEDICINE | Facility: CLINIC | Age: 67
End: 2017-04-18

## 2017-04-18 NOTE — TELEPHONE ENCOUNTER
Fax from [facility], alert, recommend osteoporosis prevention medication since pt on long term glucocorticoids, see fax at [facility], routed to MD Tiarra PINTO RN, BSN  Message handled by Nurse Triage.

## 2017-05-04 ENCOUNTER — OFFICE VISIT (OUTPATIENT)
Dept: FAMILY MEDICINE | Facility: CLINIC | Age: 67
End: 2017-05-04
Payer: MEDICARE

## 2017-05-04 VITALS
TEMPERATURE: 98.3 F | DIASTOLIC BLOOD PRESSURE: 71 MMHG | HEIGHT: 60 IN | HEART RATE: 71 BPM | BODY MASS INDEX: 37.87 KG/M2 | WEIGHT: 192.9 LBS | SYSTOLIC BLOOD PRESSURE: 125 MMHG

## 2017-05-04 DIAGNOSIS — J18.9 CAP (COMMUNITY ACQUIRED PNEUMONIA): Primary | ICD-10-CM

## 2017-05-04 DIAGNOSIS — J45.901 ASTHMA EXACERBATION: ICD-10-CM

## 2017-05-04 PROCEDURE — 99213 OFFICE O/P EST LOW 20 MIN: CPT | Performed by: FAMILY MEDICINE

## 2017-05-04 RX ORDER — PREDNISONE 20 MG/1
TABLET ORAL
Qty: 20 TABLET | Refills: 0 | Status: SHIPPED | OUTPATIENT
Start: 2017-05-04 | End: 2017-07-26

## 2017-05-04 RX ORDER — ALBUTEROL SULFATE 0.83 MG/ML
1 SOLUTION RESPIRATORY (INHALATION) EVERY 6 HOURS PRN
Qty: 25 VIAL | Refills: 0 | Status: SHIPPED | OUTPATIENT
Start: 2017-05-04 | End: 2017-07-26

## 2017-05-04 RX ORDER — AZITHROMYCIN 250 MG/1
TABLET, FILM COATED ORAL
Qty: 6 TABLET | Refills: 0 | Status: SHIPPED | OUTPATIENT
Start: 2017-05-04 | End: 2017-07-26

## 2017-05-04 NOTE — MR AVS SNAPSHOT
After Visit Summary   5/4/2017    Junior Patricia    MRN: [id number]           Patient Information     Date Of Birth          1950        Visit Information        Provider Department      5/4/2017 11:40 AM Dee De La Rosa,  [facility]        Today's Diagnoses     CAP (community acquired pneumonia)    -  1    Asthma exacerbation           Follow-ups after your visit        Your next 10 appointments already scheduled     May 09, 2017  8:00 AM CDT   LAB with [facility]   [facility] ([facility])    [address] [facility] [address] [phone]           Patient must bring picture ID.  Patient should be prepared to give a urine specimen  Please do not eat 10-12 hours before your appointment if you are coming in fasting for labs on lipids, cholesterol, or glucose (sugar).  Pregnant women should follow their Care Team instructions. Water with medications is okay. Do not drink coffee or other fluids.   If you have concerns about taking  your medications, please ask at office or if scheduling via [email or web address], send a message by clicking on Secure Messaging, Message Your Care Team.            May 09, 2017  8:15 AM CDT   Nurse Only with CR BRONZE MA/LPN   [facility] ([facility])    [address] [facility] [address] [phone]              Who to contact     If you have questions or need follow up information about today's clinic visit or your schedule please contact [facility] directly at [phone].  Normal or non-critical lab and imaging results will be communicated to you by MyChart, letter or phone within 4 business days after the clinic has received the results. If you do not hear from us within 7 days, please contact the clinic through MyChart or phone. If you have a critical or abnormal lab result, we will notify you by phone as soon as  "possible.  Submit refill requests through [employer] or call your pharmacy and they will forward the refill request to us. Please allow 3 business days for your refill to be completed.          Additional Information About Your Visit        [employer] Information     [employer] lets you send messages to your doctor, view your test results, renew your prescriptions, schedule appointments and more. To sign up, go to [email or web address] . Click on \"Log in\" on the left side of the screen, which will take you to the Welcome page. Then click on \"Sign up Now\" on the right side of the page.     You will be asked to enter the access code listed below, as well as some personal information. Please follow the directions to create your username and password.     Your access code is: [email or web address]  Expires: 2017  9:17 AM     Your access code will  in 90 days. If you need help or a new code, please call your [place] [facility] clinic or [phone].        Care EveryWhere ID     This is your Care EveryWhere ID. This could be used by other organizations to access your [facility] medical records  [id number]        Your Vitals Were     Pulse Temperature Height BMI (Body Mass Index)          71 98.3  F (36.8  C) (Oral) 4' 11.5\" (1.511 m) 38.31 kg/m2         Blood Pressure from Last 3 Encounters:   17 125/71   17 116/73   17 112/80    Weight from Last 3 Encounters:   17 192 lb 14.4 oz (87.5 kg)   17 195 lb 1.6 oz (88.5 kg)   17 190 lb 11.2 oz (86.5 kg)              Today, you had the following     No orders found for display         Today's Medication Changes          These changes are accurate as of: 17 11:59 PM.  If you have any questions, ask your nurse or doctor.               Start taking these medicines.        Dose/Directions    azithromycin 250 MG tablet   Commonly known as:  ZITHROMAX   Used for:  CAP (community acquired pneumonia), Asthma exacerbation   Started by:  Dee De La Rosa, DO "        Two tablets first day, then one tablet daily for four days.   Quantity:  6 tablet   Refills:  0       predniSONE 20 MG tablet   Commonly known as:  DELTASONE   Used for:  Asthma exacerbation   Started by:  Dee De La Rosa DO        Take 3 tabs (60 mg) by mouth daily x 3 days, 2 tabs (40 mg) daily x 3 days, 1 tab (20 mg) daily x 3 days, then 1/2 tab (10 mg) x 3 days.   Quantity:  20 tablet   Refills:  0         These medicines have changed or have updated prescriptions.        Dose/Directions    * albuterol 108 (90 BASE) MCG/ACT Inhaler   Commonly known as:  PROAIR HFA/PROVENTIL HFA/VENTOLIN HFA   This may have changed:  Another medication with the same name was added. Make sure you understand how and when to take each.   Used for:  Asthma exacerbation   Changed by:  Andreas Mobley MD        Dose:  2 puff   Inhale 2 puffs into the lungs every 6 hours as needed for shortness of breath / dyspnea or wheezing   Quantity:  1 Inhaler   Refills:  0       * albuterol (2.5 MG/3ML) 0.083% neb solution   This may have changed:  You were already taking a medication with the same name, and this prescription was added. Make sure you understand how and when to take each.   Used for:  Asthma exacerbation   Changed by:  Dee De La Rosa DO        Dose:  1 vial   Take 1 vial (2.5 mg) by nebulization every 6 hours as needed for shortness of breath / dyspnea or wheezing   Quantity:  25 vial   Refills:  0       * Notice:  This list has 2 medication(s) that are the same as other medications prescribed for you. Read the directions carefully, and ask your doctor or other care provider to review them with you.         Where to get your medicines      These medications were sent to [facility] [address] [place] [address] [place] [address]  [id number] [place] [id number]    Hours:  24-hours Phone:  [phone]     albuterol (2.5 MG/3ML) 0.083% neb solution     azithromycin 250 MG tablet    predniSONE 20 MG tablet                Primary Care Provider    Clinic [facility]       No address on file        Thank you!     Thank you for choosing [facility]  for your care. Our goal is always to provide you with excellent care. Hearing back from our patients is one way we can continue to improve our services. Please take a few minutes to complete the written survey that you may receive in the mail after your visit with us. Thank you!             Your Updated Medication List - Protect others around you: Learn how to safely use, store and throw away your medicines at www.disposemymeds.org.          This list is accurate as of: 5/4/17 11:59 PM.  Always use your most recent med list.                   Brand Name Dispense Instructions for use    ACE/ARB NOT PRESCRIBED (INTENTIONAL)      by Other route continuous prn.       * albuterol 108 (90 BASE) MCG/ACT Inhaler    PROAIR HFA/PROVENTIL HFA/VENTOLIN HFA    1 Inhaler    Inhale 2 puffs into the lungs every 6 hours as needed for shortness of breath / dyspnea or wheezing       * albuterol (2.5 MG/3ML) 0.083% neb solution     25 vial    Take 1 vial (2.5 mg) by nebulization every 6 hours as needed for shortness of breath / dyspnea or wheezing       aspirin 81 MG tablet      1 tablet twice weekly       azithromycin 250 MG tablet    ZITHROMAX    6 tablet    Two tablets first day, then one tablet daily for four days.       CELLCEPT tablet      Take  by mouth 2 times daily. 2 tabs twice daily       colchicine 0.6 MG tablet    COLCRYS    10 tablet    1 qd       diclofenac 1 % Gel topical gel    VOLTAREN    100 g    Apply  2 grams to hands four times daily using enclosed dosing card.       DILTIAZEM CD CPCR# 120 MG/24HR OR      1 cap daily       GENGRAF 25 MG Caps      6 cap daily       LIPITOR 10 MG tablet   Generic drug:  atorvastatin      Take 10 mg by mouth daily.       metoprolol 25 MG tablet    LOPRESSOR      Take 25 mg by mouth 2 times daily       predniSONE 20 MG tablet    DELTASONE    20 tablet    Take 3 tabs (60 mg) by mouth daily x 3 days, 2 tabs (40 mg) daily x 3 days, 1 tab (20 mg) daily x 3 days, then 1/2 tab (10 mg) x 3 days.       VITAMIN D PO      Take  by mouth.       * Notice:  This list has 2 medication(s) that are the same as other medications prescribed for you. Read the directions carefully, and ask your doctor or other care provider to review them with you.

## 2017-05-04 NOTE — NURSING NOTE
"Chief Complaint   Patient presents with     Cough     3 weeks, medications dr. sánchez gave  are not working        Initial /71 (BP Location: Right arm, Patient Position: Chair, Cuff Size: Adult Regular)  Pulse 71  Temp 98.3  F (36.8  C) (Oral)  Ht 4' 11.5\" (1.511 m)  Wt 192 lb 14.4 oz (87.5 kg)  BMI 38.31 kg/m2 Estimated body mass index is 38.31 kg/(m^2) as calculated from the following:    Height as of this encounter: 4' 11.5\" (1.511 m).    Weight as of this encounter: 192 lb 14.4 oz (87.5 kg).  Medication Reconciliation: complete   "

## 2017-05-04 NOTE — PROGRESS NOTES
"  SUBJECTIVE:                                                    Junior Patricia is a 66 year old male who presents to clinic today for the following health issues:    Acute Illness   Acute illness concerns: cough  Onset: 3 weeks     Fever: no     Chills/Sweats: no     Headache (location?): YES    Sinus Pressure:YES    Conjunctivitis:  YES: bilateral    Ear Pain:no    Rhinorrhea: YES    Congestion: YES    Sore Throat: YES     Cough: YES-non-productive    Wheeze: YES    Decreased Appetite: no     Nausea: no     Vomiting: no     Diarrhea:  no     Dysuria/Freq.: no     Fatigue/Achiness: no     Sick/Strep Exposure: no      Therapies Tried and outcome: prednisone, Doxycycline monohydrate  Saw Dr. Mobley on 4/5 and given doxycycline and prednisone.  Did not improve.  He has been using his inhaler about 2-3 times a day.        Problem list and histories reviewed & adjusted, as indicated.  Additional history: as documented      ROS:  Constitutional, HEENT, cardiovascular, pulmonary, gi and gu systems are negative, except as otherwise noted.    OBJECTIVE:                                                    /71 (BP Location: Right arm, Patient Position: Chair, Cuff Size: Adult Regular)  Pulse 71  Temp 98.3  F (36.8  C) (Oral)  Ht 4' 11.5\" (1.511 m)  Wt 192 lb 14.4 oz (87.5 kg)  BMI 38.31 kg/m2  Body mass index is 38.31 kg/(m^2).  GENERAL: healthy, alert and no distress  EYES: Eyes grossly normal to inspection, PERRL and conjunctivae and sclerae normal  HENT: ear canals and TM's normal, nose and mouth without ulcers or lesions  NECK: no adenopathy  RESP: Diffuse expiratory wheezing, mild crackles in left lung  CV: regular rates and rhythm, normal S1 S2, no S3 or S4 and no murmur, click or rub     ASSESSMENT/PLAN:                                                        1. CAP (community acquired pneumonia)  - S/P treatment with prednisone 20mg QD x5 days and doxycycline  - Patient insists that Zpack works for this every " time he gets it  - azithromycin (ZITHROMAX) 250 MG tablet; Two tablets first day, then one tablet daily for four days.  Dispense: 6 tablet; Refill: 0    2. Asthma exacerbation  - azithromycin (ZITHROMAX) 250 MG tablet; Two tablets first day, then one tablet daily for four days.  Dispense: 6 tablet; Refill: 0  - predniSONE (DELTASONE) 20 MG tablet; Take 3 tabs (60 mg) by mouth daily x 3 days, 2 tabs (40 mg) daily x 3 days, 1 tab (20 mg) daily x 3 days, then 1/2 tab (10 mg) x 3 days.  Dispense: 20 tablet; Refill: 0  - albuterol (2.5 MG/3ML) 0.083% neb solution; Take 1 vial (2.5 mg) by nebulization every 6 hours as needed for shortness of breath / dyspnea or wheezing  Dispense: 25 vial; Refill: 0    F/U next week for recheck     Dee De La Rosa DO  [facility]

## 2017-05-09 ENCOUNTER — ALLIED HEALTH/NURSE VISIT (OUTPATIENT)
Dept: NURSING | Facility: CLINIC | Age: 67
End: 2017-05-09
Payer: MEDICARE

## 2017-05-09 VITALS — BODY MASS INDEX: 37.53 KG/M2 | DIASTOLIC BLOOD PRESSURE: 74 MMHG | SYSTOLIC BLOOD PRESSURE: 118 MMHG | WEIGHT: 189 LBS

## 2017-05-09 DIAGNOSIS — Z79.52 LONG TERM CURRENT USE OF SYSTEMIC STEROIDS: ICD-10-CM

## 2017-05-09 DIAGNOSIS — Z01.30 BLOOD PRESSURE CHECK: Primary | ICD-10-CM

## 2017-05-09 DIAGNOSIS — Z79.899 NEED FOR PROPHYLACTIC CHEMOTHERAPY: ICD-10-CM

## 2017-05-09 DIAGNOSIS — Z94.0 KIDNEY TRANSPLANT STATUS, LIVING UNRELATED DONOR: ICD-10-CM

## 2017-05-09 DIAGNOSIS — Z48.298 AFTERCARE FOLLOWING ORGAN TRANSPLANT: ICD-10-CM

## 2017-05-09 DIAGNOSIS — Z94.0 KIDNEY REPLACED BY TRANSPLANT: ICD-10-CM

## 2017-05-09 LAB
ANION GAP SERPL CALCULATED.3IONS-SCNC: 12 MMOL/L (ref 3–14)
BUN SERPL-MCNC: 38 MG/DL (ref 7–30)
CALCIUM SERPL-MCNC: 9 MG/DL (ref 8.5–10.1)
CHLORIDE SERPL-SCNC: 108 MMOL/L (ref 94–109)
CO2 SERPL-SCNC: 19 MMOL/L (ref 20–32)
CREAT SERPL-MCNC: 1.62 MG/DL (ref 0.66–1.25)
ERYTHROCYTE [DISTWIDTH] IN BLOOD BY AUTOMATED COUNT: 14.1 % (ref 10–15)
GFR SERPL CREATININE-BSD FRML MDRD: 43 ML/MIN/1.7M2
GLUCOSE SERPL-MCNC: 108 MG/DL (ref 70–99)
HCT VFR BLD AUTO: 38 % (ref 40–53)
HGB BLD-MCNC: 12.9 G/DL (ref 13.3–17.7)
MCH RBC QN AUTO: 33.4 PG (ref 26.5–33)
MCHC RBC AUTO-ENTMCNC: 33.9 G/DL (ref 31.5–36.5)
MCV RBC AUTO: 98 FL (ref 78–100)
PLATELET # BLD AUTO: 75 10E9/L (ref 150–450)
POTASSIUM SERPL-SCNC: 3.6 MMOL/L (ref 3.4–5.3)
RBC # BLD AUTO: 3.86 10E12/L (ref 4.4–5.9)
SODIUM SERPL-SCNC: 139 MMOL/L (ref 133–144)
WBC # BLD AUTO: 7.2 10E9/L (ref 4–11)

## 2017-05-09 PROCEDURE — 36415 COLL VENOUS BLD VENIPUNCTURE: CPT | Performed by: SPECIALIST

## 2017-05-09 PROCEDURE — 85027 COMPLETE CBC AUTOMATED: CPT | Performed by: FAMILY MEDICINE

## 2017-05-09 PROCEDURE — 80048 BASIC METABOLIC PNL TOTAL CA: CPT | Performed by: SPECIALIST

## 2017-05-09 PROCEDURE — 99207 ZZC NO CHARGE NURSE ONLY: CPT

## 2017-05-17 ENCOUNTER — TELEPHONE (OUTPATIENT)
Dept: FAMILY MEDICINE | Facility: CLINIC | Age: 67
End: 2017-05-17

## 2017-05-17 ASSESSMENT — ANXIETY QUESTIONNAIRES
3. WORRYING TOO MUCH ABOUT DIFFERENT THINGS: NOT AT ALL
1. FEELING NERVOUS, ANXIOUS, OR ON EDGE: NOT AT ALL
5. BEING SO RESTLESS THAT IT IS HARD TO SIT STILL: NOT AT ALL
GAD7 TOTAL SCORE: 1
2. NOT BEING ABLE TO STOP OR CONTROL WORRYING: NOT AT ALL
7. FEELING AFRAID AS IF SOMETHING AWFUL MIGHT HAPPEN: NOT AT ALL
6. BECOMING EASILY ANNOYED OR IRRITABLE: SEVERAL DAYS
IF YOU CHECKED OFF ANY PROBLEMS ON THIS QUESTIONNAIRE, HOW DIFFICULT HAVE THESE PROBLEMS MADE IT FOR YOU TO DO YOUR WORK, TAKE CARE OF THINGS AT HOME, OR GET ALONG WITH OTHER PEOPLE: NOT DIFFICULT AT ALL

## 2017-05-17 ASSESSMENT — PATIENT HEALTH QUESTIONNAIRE - PHQ9: 5. POOR APPETITE OR OVEREATING: NOT AT ALL

## 2017-05-17 NOTE — TELEPHONE ENCOUNTER
Panel Management Review      Patient has the following on his problem list:     Asthma review     ACT Total Scores 5/17/2017   ACT TOTAL SCORE (Goal Greater than or Equal to 20) 5   In the past 12 months, how many times did you visit the emergency room for your asthma without being admitted to the hospital? 0   In the past 12 months, how many times were you hospitalized overnight because of your asthma? 0      1. Is Asthma diagnosis on the Problem List? Yes    2. Is Asthma listed on Health Maintenance? Yes    3. Patient is due for:  ACT      Composite cancer screening  Chart review shows that this patient is due/due soon for the following None  Summary:    Patient is due/failing the following:   ACT    Action needed:   Patient needs to do ACT.    Type of outreach:    Phone, spoke to patient.  Was given ACT prior, did ACT over the phone    Questions for provider review:    None                                                                                                                                    Gunjan Sidhu CMA'     Chart routed to none .

## 2017-05-18 ASSESSMENT — ASTHMA QUESTIONNAIRES: ACT_TOTALSCORE: 5

## 2017-05-18 ASSESSMENT — ANXIETY QUESTIONNAIRES: GAD7 TOTAL SCORE: 1

## 2017-06-27 ENCOUNTER — ALLIED HEALTH/NURSE VISIT (OUTPATIENT)
Dept: NURSING | Facility: CLINIC | Age: 67
End: 2017-06-27
Payer: MEDICARE

## 2017-06-27 VITALS — HEART RATE: 52 BPM | SYSTOLIC BLOOD PRESSURE: 102 MMHG | DIASTOLIC BLOOD PRESSURE: 70 MMHG

## 2017-06-27 DIAGNOSIS — N18.30 CKD (CHRONIC KIDNEY DISEASE) STAGE 3, GFR 30-59 ML/MIN (H): Primary | ICD-10-CM

## 2017-06-27 DIAGNOSIS — Z94.0 KIDNEY REPLACED BY TRANSPLANT: Primary | ICD-10-CM

## 2017-06-27 PROCEDURE — 36415 COLL VENOUS BLD VENIPUNCTURE: CPT

## 2017-06-27 PROCEDURE — 99207 ZZC NO CHARGE NURSE ONLY: CPT

## 2017-06-27 NOTE — NURSING NOTE
Junior Patricia is a 67 year old male who comes in today for a Blood Pressure check because of Chronic Kidney Disease.    Vitals as recorded, a regular cuff was used.    Patient is taking medication as prescribed but is not on prescribed blood pressure medication  Patient is tolerating medications well.  Patient is not monitoring Blood Pressure at home.      Current complaints: none    Disposition: follow-up as indicated by MD/ROMERO Garg M.A.

## 2017-06-27 NOTE — MR AVS SNAPSHOT
After Visit Summary   6/27/2017    Junior Patricia    MRN: [id number]           Patient Information     Date Of Birth          1950        Visit Information        Provider Department      6/27/2017 8:15 AM ALLI TOLBERT MA/FRAN [facility]        Today's Diagnoses     CKD (chronic kidney disease) stage 3, GFR 30-59 ml/min    -  1       Follow-ups after your visit        Your next 10 appointments already scheduled     Jul 28, 2017  8:00 AM CDT   LAB with CR LAB   [facility]    [address] [address] [phone]           Patient must bring picture ID.  Patient should be prepared to give a urine specimen  Please do not eat 10-12 hours before your appointment if you are coming in fasting for labs on lipids, cholesterol, or glucose (sugar).  Pregnant women should follow their Care Team instructions. Water with medications is okay. Do not drink coffee or other fluids.   If you have concerns about taking  your medications, please ask at office or if scheduling via [email or web address], send a message by clicking on Secure Messaging, Message Your Care Team.            Jul 28, 2017  8:15 AM CDT   Nurse Only with CR BRONZE MA/LPN   [facility]    [address] [address] [phone]              Who to contact     If you have questions or need follow up information about today's clinic visit or your schedule please contact [facility] directly at [phone].  Normal or non-critical lab and imaging results will be communicated to you by MyChart, letter or phone within 4 business days after the clinic has received the results. If you do not hear from us within 7 days, please contact the clinic through MyChart or phone. If you have a critical or abnormal lab result, we will notify you by phone as soon as possible.  Submit refill  "requests through Flipaste or call your pharmacy and they will forward the refill request to us. Please allow 3 business days for your refill to be completed.          Additional Information About Your Visit        Urban MappingharBaihe Information     Flipaste lets you send messages to your doctor, view your test results, renew your prescriptions, schedule appointments and more. To sign up, go to [email or web address] . Click on \"Log in\" on the left side of the screen, which will take you to the Welcome page. Then click on \"Sign up Now\" on the right side of the page.     You will be asked to enter the access code listed below, as well as some personal information. Please follow the directions to create your username and password.     Your access code is: [email or web address]  Expires: 2017 12:04 PM     Your access code will  in 90 days. If you need help or a new code, please call your [facility] clinic or [phone].        Care EveryWhere ID     This is your Care EveryWhere ID. This could be used by other organizations to access your [facility] medical records  [id number]        Your Vitals Were     Pulse                   52            Blood Pressure from Last 3 Encounters:   17 102/70   17 118/74   17 125/71    Weight from Last 3 Encounters:   17 189 lb (85.7 kg)   17 192 lb 14.4 oz (87.5 kg)   17 195 lb 1.6 oz (88.5 kg)              Today, you had the following     No orders found for display       Primary Care Provider    Clinic [facility]       No address on file        Equal Access to Services     [facility]: Hadii aad ku hadasho Soosmmerali, waaxda luqadaha, qaybta kaalmada adeelizabethyaserge, rigoberto sears . So [facility] [phone].    ATENCIÓN: Si habla español, tiene a murdock disposición servicios gratuitos de asistencia lingüística. Llame al [phone].    We comply with applicable federal civil rights laws and [place] laws. We do not discriminate on the " basis of race, color, national origin, age, disability sex, sexual orientation or gender identity.            Thank you!     Thank you for choosing [facility]  for your care. Our goal is always to provide you with excellent care. Hearing back from our patients is one way we can continue to improve our services. Please take a few minutes to complete the written survey that you may receive in the mail after your visit with us. Thank you!             Your Updated Medication List - Protect others around you: Learn how to safely use, store and throw away your medicines at www.disposemymeds.org.          This list is accurate as of: 6/27/17 12:04 PM.  Always use your most recent med list.                   Brand Name Dispense Instructions for use Diagnosis    ACE/ARB NOT PRESCRIBED (INTENTIONAL)      by Other route continuous prn.    Kidney replaced by transplant, CKD (chronic kidney disease) stage 3, GFR 30-59 ml/min       * albuterol 108 (90 BASE) MCG/ACT Inhaler    PROAIR HFA/PROVENTIL HFA/VENTOLIN HFA    1 Inhaler    Inhale 2 puffs into the lungs every 6 hours as needed for shortness of breath / dyspnea or wheezing    Asthma exacerbation       * albuterol (2.5 MG/3ML) 0.083% neb solution     25 vial    Take 1 vial (2.5 mg) by nebulization every 6 hours as needed for shortness of breath / dyspnea or wheezing    Asthma exacerbation       aspirin 81 MG tablet      1 tablet twice weekly        azithromycin 250 MG tablet    ZITHROMAX    6 tablet    Two tablets first day, then one tablet daily for four days.    CAP (community acquired pneumonia), Asthma exacerbation       CELLCEPT tablet      Take  by mouth 2 times daily. 2 tabs twice daily        colchicine 0.6 MG tablet    COLCRYS    10 tablet    1 qd    Gout, unspecified       diclofenac 1 % Gel topical gel    VOLTAREN    100 g    Apply  2 grams to hands four times daily using enclosed dosing card.    Kidney replaced by transplant, Gout, unspecified,  Generalized osteoarthrosis, unspecified site, CKD (chronic kidney disease) stage 3, GFR 30-59 ml/min       DILTIAZEM CD CPCR# 120 MG/24HR OR      1 cap daily        GENGRAF 25 MG Caps      6 cap daily        LIPITOR 10 MG tablet   Generic drug:  atorvastatin      Take 10 mg by mouth daily.        metoprolol 25 MG tablet    LOPRESSOR     Take 25 mg by mouth 2 times daily        predniSONE 20 MG tablet    DELTASONE    20 tablet    Take 3 tabs (60 mg) by mouth daily x 3 days, 2 tabs (40 mg) daily x 3 days, 1 tab (20 mg) daily x 3 days, then 1/2 tab (10 mg) x 3 days.    Asthma exacerbation       VITAMIN D PO      Take  by mouth.        * Notice:  This list has 2 medication(s) that are the same as other medications prescribed for you. Read the directions carefully, and ask your doctor or other care provider to review them with you.

## 2017-07-26 ENCOUNTER — HOSPITAL ENCOUNTER (INPATIENT)
Facility: CLINIC | Age: 67
LOS: 2 days | Discharge: HOME | DRG: 392 | End: 2017-07-28
Attending: EMERGENCY MEDICINE | Admitting: HOSPITALIST
Payer: MEDICARE

## 2017-07-26 ENCOUNTER — APPOINTMENT (OUTPATIENT)
Dept: CT IMAGING | Facility: CLINIC | Age: 67
DRG: 392 | End: 2017-07-26
Attending: EMERGENCY MEDICINE
Payer: MEDICARE

## 2017-07-26 ENCOUNTER — APPOINTMENT (OUTPATIENT)
Dept: ULTRASOUND IMAGING | Facility: CLINIC | Age: 67
DRG: 392 | End: 2017-07-26
Attending: EMERGENCY MEDICINE
Payer: MEDICARE

## 2017-07-26 DIAGNOSIS — A41.9 SEPSIS, DUE TO UNSPECIFIED ORGANISM: ICD-10-CM

## 2017-07-26 DIAGNOSIS — D84.9 IMMUNOCOMPROMISED STATE (H): ICD-10-CM

## 2017-07-26 DIAGNOSIS — K52.9 COLITIS: ICD-10-CM

## 2017-07-26 LAB
ALBUMIN SERPL-MCNC: 2.5 G/DL (ref 3.4–5)
ALBUMIN SERPL-MCNC: 2.9 G/DL (ref 3.4–5)
ALP SERPL-CCNC: 157 U/L (ref 40–150)
ALP SERPL-CCNC: 222 U/L (ref 40–150)
ALT SERPL W P-5'-P-CCNC: 79 U/L (ref 0–70)
ALT SERPL W P-5'-P-CCNC: 98 U/L (ref 0–70)
ANION GAP SERPL CALCULATED.3IONS-SCNC: 4 MMOL/L (ref 3–14)
ANION GAP SERPL CALCULATED.3IONS-SCNC: 7 MMOL/L (ref 3–14)
AST SERPL W P-5'-P-CCNC: 62 U/L (ref 0–45)
AST SERPL W P-5'-P-CCNC: 91 U/L (ref 0–45)
BASOPHILS # BLD AUTO: 0 10E9/L (ref 0–0.2)
BASOPHILS NFR BLD AUTO: 0.2 %
BILIRUB SERPL-MCNC: 1.1 MG/DL (ref 0.2–1.3)
BILIRUB SERPL-MCNC: 1.8 MG/DL (ref 0.2–1.3)
BUN SERPL-MCNC: 26 MG/DL (ref 7–30)
BUN SERPL-MCNC: 29 MG/DL (ref 7–30)
CALCIUM SERPL-MCNC: 7.4 MG/DL (ref 8.5–10.1)
CALCIUM SERPL-MCNC: 8 MG/DL (ref 8.5–10.1)
CHLORIDE SERPL-SCNC: 110 MMOL/L (ref 94–109)
CHLORIDE SERPL-SCNC: 113 MMOL/L (ref 94–109)
CO2 SERPL-SCNC: 18 MMOL/L (ref 20–32)
CO2 SERPL-SCNC: 22 MMOL/L (ref 20–32)
CREAT SERPL-MCNC: 1.46 MG/DL (ref 0.66–1.25)
CREAT SERPL-MCNC: 1.49 MG/DL (ref 0.66–1.25)
CYCLOSPORINE BLD LC/MS/MS-MCNC: 32 UG/L (ref 50–400)
DIFFERENTIAL METHOD BLD: ABNORMAL
EOSINOPHIL # BLD AUTO: 0 10E9/L (ref 0–0.7)
EOSINOPHIL NFR BLD AUTO: 0.6 %
ERYTHROCYTE [DISTWIDTH] IN BLOOD BY AUTOMATED COUNT: 14.1 % (ref 10–15)
GFR SERPL CREATININE-BSD FRML MDRD: 47 ML/MIN/1.7M2
GFR SERPL CREATININE-BSD FRML MDRD: 48 ML/MIN/1.7M2
GLUCOSE BLDC GLUCOMTR-MCNC: 233 MG/DL (ref 70–99)
GLUCOSE SERPL-MCNC: 143 MG/DL (ref 70–99)
GLUCOSE SERPL-MCNC: 155 MG/DL (ref 70–99)
HCT VFR BLD AUTO: 42 % (ref 40–53)
HGB BLD-MCNC: 13 G/DL (ref 13.3–17.7)
IMM GRANULOCYTES # BLD: 0 10E9/L (ref 0–0.4)
IMM GRANULOCYTES NFR BLD: 0.2 %
INTERPRETATION ECG - MUSE: NORMAL
LACTATE BLD-SCNC: 2.1 MMOL/L (ref 0.7–2.1)
LACTATE BLD-SCNC: 2.4 MMOL/L (ref 0.7–2.1)
LIPASE SERPL-CCNC: 153 U/L (ref 73–393)
LYMPHOCYTES # BLD AUTO: 0.3 10E9/L (ref 0.8–5.3)
LYMPHOCYTES NFR BLD AUTO: 6.8 %
MCH RBC QN AUTO: 33.7 PG (ref 26.5–33)
MCHC RBC AUTO-ENTMCNC: 31 G/DL (ref 31.5–36.5)
MCV RBC AUTO: 109 FL (ref 78–100)
MONOCYTES # BLD AUTO: 0.6 10E9/L (ref 0–1.3)
MONOCYTES NFR BLD AUTO: 12 %
NEUTROPHILS # BLD AUTO: 4 10E9/L (ref 1.6–8.3)
NEUTROPHILS NFR BLD AUTO: 80.2 %
NRBC # BLD AUTO: 0 10*3/UL
NRBC BLD AUTO-RTO: 0 /100
PLATELET # BLD AUTO: 51 10E9/L (ref 150–450)
POTASSIUM SERPL-SCNC: 4.4 MMOL/L (ref 3.4–5.3)
POTASSIUM SERPL-SCNC: 5.4 MMOL/L (ref 3.4–5.3)
PROCALCITONIN SERPL-MCNC: 6.6 NG/ML
PROT SERPL-MCNC: 5.4 G/DL (ref 6.8–8.8)
PROT SERPL-MCNC: 6.6 G/DL (ref 6.8–8.8)
RBC # BLD AUTO: 3.86 10E12/L (ref 4.4–5.9)
SODIUM SERPL-SCNC: 136 MMOL/L (ref 133–144)
SODIUM SERPL-SCNC: 138 MMOL/L (ref 133–144)
TME LAST DOSE: ABNORMAL H
WBC # BLD AUTO: 5 10E9/L (ref 4–11)

## 2017-07-26 PROCEDURE — 83605 ASSAY OF LACTIC ACID: CPT | Performed by: EMERGENCY MEDICINE

## 2017-07-26 PROCEDURE — 96365 THER/PROPH/DIAG IV INF INIT: CPT

## 2017-07-26 PROCEDURE — 80180 DRUG SCRN QUAN MYCOPHENOLATE: CPT | Performed by: HOSPITALIST

## 2017-07-26 PROCEDURE — 76705 ECHO EXAM OF ABDOMEN: CPT

## 2017-07-26 PROCEDURE — 85025 COMPLETE CBC W/AUTO DIFF WBC: CPT | Performed by: EMERGENCY MEDICINE

## 2017-07-26 PROCEDURE — 25000125 ZZHC RX 250: Performed by: HOSPITALIST

## 2017-07-26 PROCEDURE — 80053 COMPREHEN METABOLIC PANEL: CPT | Performed by: EMERGENCY MEDICINE

## 2017-07-26 PROCEDURE — 25000128 H RX IP 250 OP 636: Performed by: EMERGENCY MEDICINE

## 2017-07-26 PROCEDURE — 93005 ELECTROCARDIOGRAM TRACING: CPT

## 2017-07-26 PROCEDURE — 87077 CULTURE AEROBIC IDENTIFY: CPT | Performed by: EMERGENCY MEDICINE

## 2017-07-26 PROCEDURE — 36415 COLL VENOUS BLD VENIPUNCTURE: CPT | Performed by: EMERGENCY MEDICINE

## 2017-07-26 PROCEDURE — 87040 BLOOD CULTURE FOR BACTERIA: CPT | Performed by: EMERGENCY MEDICINE

## 2017-07-26 PROCEDURE — 25000128 H RX IP 250 OP 636: Performed by: PHYSICIAN ASSISTANT

## 2017-07-26 PROCEDURE — 99223 1ST HOSP IP/OBS HIGH 75: CPT | Mod: AI | Performed by: HOSPITALIST

## 2017-07-26 PROCEDURE — A9270 NON-COVERED ITEM OR SERVICE: HCPCS | Mod: GY | Performed by: HOSPITALIST

## 2017-07-26 PROCEDURE — 36415 COLL VENOUS BLD VENIPUNCTURE: CPT | Performed by: HOSPITALIST

## 2017-07-26 PROCEDURE — 36416 COLLJ CAPILLARY BLOOD SPEC: CPT | Performed by: EMERGENCY MEDICINE

## 2017-07-26 PROCEDURE — 99285 EMERGENCY DEPT VISIT HI MDM: CPT | Mod: 25

## 2017-07-26 PROCEDURE — 12000007 ZZH R&B INTERMEDIATE

## 2017-07-26 PROCEDURE — 84145 PROCALCITONIN (PCT): CPT | Performed by: HOSPITALIST

## 2017-07-26 PROCEDURE — 74176 CT ABD & PELVIS W/O CONTRAST: CPT

## 2017-07-26 PROCEDURE — 25800025 ZZH RX 258: Performed by: HOSPITALIST

## 2017-07-26 PROCEDURE — 25000132 ZZH RX MED GY IP 250 OP 250 PS 637: Mod: GY | Performed by: HOSPITALIST

## 2017-07-26 PROCEDURE — 25000132 ZZH RX MED GY IP 250 OP 250 PS 637: Mod: GY

## 2017-07-26 PROCEDURE — S5010 5% DEXTROSE AND 0.45% SALINE: HCPCS | Performed by: HOSPITALIST

## 2017-07-26 PROCEDURE — 83690 ASSAY OF LIPASE: CPT | Performed by: EMERGENCY MEDICINE

## 2017-07-26 PROCEDURE — 96361 HYDRATE IV INFUSION ADD-ON: CPT

## 2017-07-26 PROCEDURE — 80158 DRUG ASSAY CYCLOSPORINE: CPT | Performed by: HOSPITALIST

## 2017-07-26 PROCEDURE — A9270 NON-COVERED ITEM OR SERVICE: HCPCS | Mod: GY

## 2017-07-26 PROCEDURE — 87186 SC STD MICRODIL/AGAR DIL: CPT | Performed by: EMERGENCY MEDICINE

## 2017-07-26 PROCEDURE — 25000131 ZZH RX MED GY IP 250 OP 636 PS 637: Mod: GY | Performed by: HOSPITALIST

## 2017-07-26 PROCEDURE — 96375 TX/PRO/DX INJ NEW DRUG ADDON: CPT

## 2017-07-26 PROCEDURE — 00000146 ZZHCL STATISTIC GLUCOSE BY METER IP

## 2017-07-26 PROCEDURE — 87800 DETECT AGNT MULT DNA DIREC: CPT | Performed by: EMERGENCY MEDICINE

## 2017-07-26 PROCEDURE — 99207 ZZC CDG-CODE CATEGORY CHANGED: CPT | Performed by: HOSPITALIST

## 2017-07-26 RX ORDER — CYCLOSPORINE 25 MG/1
50 CAPSULE ORAL EVERY MORNING
COMMUNITY
End: 2017-07-26

## 2017-07-26 RX ORDER — SODIUM CHLORIDE 9 MG/ML
1000 INJECTION, SOLUTION INTRAVENOUS CONTINUOUS
Status: DISCONTINUED | OUTPATIENT
Start: 2017-07-26 | End: 2017-07-26

## 2017-07-26 RX ORDER — ACETAMINOPHEN 325 MG/1
325 TABLET ORAL EVERY 4 HOURS PRN
Status: DISCONTINUED | OUTPATIENT
Start: 2017-07-26 | End: 2017-07-28 | Stop reason: HOSPADM

## 2017-07-26 RX ORDER — CYCLOSPORINE 25 MG/1
25 CAPSULE ORAL EVERY EVENING
Status: DISCONTINUED | OUTPATIENT
Start: 2017-07-26 | End: 2017-07-28 | Stop reason: HOSPADM

## 2017-07-26 RX ORDER — CYCLOSPORINE 25 MG/1
50 CAPSULE ORAL EVERY MORNING
Status: DISCONTINUED | OUTPATIENT
Start: 2017-07-26 | End: 2017-07-28 | Stop reason: HOSPADM

## 2017-07-26 RX ORDER — PREDNISONE 5 MG/1
5 TABLET ORAL DAILY
Status: DISCONTINUED | OUTPATIENT
Start: 2017-07-26 | End: 2017-07-28 | Stop reason: HOSPADM

## 2017-07-26 RX ORDER — LACTOBACILLUS RHAMNOSUS GG 10B CELL
1 CAPSULE ORAL 2 TIMES DAILY
Status: DISCONTINUED | OUTPATIENT
Start: 2017-07-26 | End: 2017-07-28 | Stop reason: HOSPADM

## 2017-07-26 RX ORDER — NITROGLYCERIN 0.4 MG/1
0.4 TABLET SUBLINGUAL EVERY 5 MIN PRN
Status: DISCONTINUED | OUTPATIENT
Start: 2017-07-26 | End: 2017-07-28 | Stop reason: HOSPADM

## 2017-07-26 RX ORDER — ACETAMINOPHEN 500 MG
1000 TABLET ORAL ONCE
Status: COMPLETED | OUTPATIENT
Start: 2017-07-26 | End: 2017-07-26

## 2017-07-26 RX ORDER — PREDNISONE 5 MG/1
5 TABLET ORAL DAILY
COMMUNITY

## 2017-07-26 RX ORDER — CYCLOSPORINE 25 MG/1
25 CAPSULE ORAL EVERY EVENING
COMMUNITY
End: 2017-07-26

## 2017-07-26 RX ORDER — LIDOCAINE 40 MG/G
CREAM TOPICAL
Status: DISCONTINUED | OUTPATIENT
Start: 2017-07-26 | End: 2017-07-26

## 2017-07-26 RX ORDER — DILTIAZEM HYDROCHLORIDE 120 MG/1
120 CAPSULE, COATED, EXTENDED RELEASE ORAL DAILY
COMMUNITY

## 2017-07-26 RX ORDER — CYCLOSPORINE 25 MG/1
25 CAPSULE, LIQUID FILLED ORAL EVERY EVENING
Status: DISCONTINUED | OUTPATIENT
Start: 2017-07-26 | End: 2017-07-26 | Stop reason: CLARIF

## 2017-07-26 RX ORDER — ONDANSETRON 2 MG/ML
4 INJECTION INTRAMUSCULAR; INTRAVENOUS EVERY 6 HOURS PRN
Status: DISCONTINUED | OUTPATIENT
Start: 2017-07-26 | End: 2017-07-28 | Stop reason: HOSPADM

## 2017-07-26 RX ORDER — ERTAPENEM 1 G/1
1 INJECTION, POWDER, LYOPHILIZED, FOR SOLUTION INTRAMUSCULAR; INTRAVENOUS ONCE
Status: COMPLETED | OUTPATIENT
Start: 2017-07-26 | End: 2017-07-26

## 2017-07-26 RX ORDER — MYCOPHENOLATE MOFETIL 500 MG/1
1000 TABLET, FILM COATED ORAL 2 TIMES DAILY
Status: DISCONTINUED | OUTPATIENT
Start: 2017-07-26 | End: 2017-07-26 | Stop reason: CLARIF

## 2017-07-26 RX ORDER — ONDANSETRON 4 MG/1
4 TABLET, ORALLY DISINTEGRATING ORAL EVERY 6 HOURS PRN
Status: DISCONTINUED | OUTPATIENT
Start: 2017-07-26 | End: 2017-07-28 | Stop reason: HOSPADM

## 2017-07-26 RX ORDER — SODIUM CHLORIDE 9 MG/ML
INJECTION, SOLUTION INTRAVENOUS CONTINUOUS
Status: DISCONTINUED | OUTPATIENT
Start: 2017-07-26 | End: 2017-07-26

## 2017-07-26 RX ORDER — ONDANSETRON 2 MG/ML
4 INJECTION INTRAMUSCULAR; INTRAVENOUS EVERY 30 MIN PRN
Status: DISCONTINUED | OUTPATIENT
Start: 2017-07-26 | End: 2017-07-26

## 2017-07-26 RX ORDER — DILTIAZEM HYDROCHLORIDE 120 MG/1
120 CAPSULE, COATED, EXTENDED RELEASE ORAL DAILY
Status: DISCONTINUED | OUTPATIENT
Start: 2017-07-26 | End: 2017-07-28 | Stop reason: HOSPADM

## 2017-07-26 RX ORDER — HYDROMORPHONE HYDROCHLORIDE 1 MG/ML
0.5 INJECTION, SOLUTION INTRAMUSCULAR; INTRAVENOUS; SUBCUTANEOUS
Status: DISCONTINUED | OUTPATIENT
Start: 2017-07-26 | End: 2017-07-26

## 2017-07-26 RX ORDER — LIDOCAINE 40 MG/G
CREAM TOPICAL
Status: DISCONTINUED | OUTPATIENT
Start: 2017-07-26 | End: 2017-07-28 | Stop reason: HOSPADM

## 2017-07-26 RX ORDER — CYCLOSPORINE 25 MG/1
25 CAPSULE, LIQUID FILLED ORAL EVERY EVENING
COMMUNITY
End: 2017-08-04

## 2017-07-26 RX ORDER — CYCLOSPORINE 25 MG/1
50 CAPSULE, LIQUID FILLED ORAL EVERY MORNING
Status: DISCONTINUED | OUTPATIENT
Start: 2017-07-26 | End: 2017-07-26 | Stop reason: CLARIF

## 2017-07-26 RX ORDER — L. ACIDOPHILUS/PECTIN, CITRUS 25MM-100MG
1 TABLET ORAL
Status: DISCONTINUED | OUTPATIENT
Start: 2017-07-26 | End: 2017-07-26 | Stop reason: CLARIF

## 2017-07-26 RX ORDER — NALOXONE HYDROCHLORIDE 0.4 MG/ML
.1-.4 INJECTION, SOLUTION INTRAMUSCULAR; INTRAVENOUS; SUBCUTANEOUS
Status: DISCONTINUED | OUTPATIENT
Start: 2017-07-26 | End: 2017-07-28 | Stop reason: HOSPADM

## 2017-07-26 RX ORDER — OXYCODONE HYDROCHLORIDE 5 MG/1
5-10 TABLET ORAL
Status: DISCONTINUED | OUTPATIENT
Start: 2017-07-26 | End: 2017-07-28 | Stop reason: HOSPADM

## 2017-07-26 RX ORDER — CYCLOSPORINE 25 MG/1
50 CAPSULE, LIQUID FILLED ORAL EVERY MORNING
COMMUNITY
End: 2017-01-01

## 2017-07-26 RX ORDER — MYCOPHENOLATE MOFETIL 250 MG/1
1000 CAPSULE ORAL 2 TIMES DAILY
Status: DISCONTINUED | OUTPATIENT
Start: 2017-07-26 | End: 2017-07-28 | Stop reason: HOSPADM

## 2017-07-26 RX ORDER — ACETAMINOPHEN 500 MG
TABLET ORAL
Status: COMPLETED
Start: 2017-07-26 | End: 2017-07-26

## 2017-07-26 RX ADMIN — PREDNISONE 5 MG: 5 TABLET ORAL at 14:24

## 2017-07-26 RX ADMIN — SODIUM CHLORIDE 1000 ML: 9 INJECTION, SOLUTION INTRAVENOUS at 11:28

## 2017-07-26 RX ADMIN — METOPROLOL TARTRATE 12.5 MG: 25 TABLET, FILM COATED ORAL at 14:20

## 2017-07-26 RX ADMIN — ERTAPENEM SODIUM 1 G: 1 INJECTION, POWDER, LYOPHILIZED, FOR SOLUTION INTRAMUSCULAR; INTRAVENOUS at 10:45

## 2017-07-26 RX ADMIN — Medication 1 CAPSULE: at 21:06

## 2017-07-26 RX ADMIN — CYCLOSPORINE 25 MG: 25 CAPSULE ORAL at 21:06

## 2017-07-26 RX ADMIN — ONDANSETRON 4 MG: 2 INJECTION INTRAMUSCULAR; INTRAVENOUS at 05:00

## 2017-07-26 RX ADMIN — Medication 1000 MG: at 07:17

## 2017-07-26 RX ADMIN — MYCOPHENOLATE MOFETIL 1000 MG: 250 CAPSULE ORAL at 21:16

## 2017-07-26 RX ADMIN — Medication 1 CAPSULE: at 14:20

## 2017-07-26 RX ADMIN — DEXTROSE AND SODIUM CHLORIDE: 5; 450 INJECTION, SOLUTION INTRAVENOUS at 14:18

## 2017-07-26 RX ADMIN — SODIUM CHLORIDE 1000 ML: 9 INJECTION, SOLUTION INTRAVENOUS at 05:12

## 2017-07-26 RX ADMIN — SODIUM CHLORIDE 1000 ML: 9 INJECTION, SOLUTION INTRAVENOUS at 07:18

## 2017-07-26 RX ADMIN — METOPROLOL TARTRATE 12.5 MG: 25 TABLET, FILM COATED ORAL at 21:06

## 2017-07-26 RX ADMIN — ACETAMINOPHEN 1000 MG: 500 TABLET, FILM COATED ORAL at 07:17

## 2017-07-26 RX ADMIN — Medication 0.5 MG: at 05:01

## 2017-07-26 RX ADMIN — DILTIAZEM HYDROCHLORIDE 120 MG: 120 CAPSULE, EXTENDED RELEASE ORAL at 14:24

## 2017-07-26 ASSESSMENT — ENCOUNTER SYMPTOMS
FEVER: 1
DIARRHEA: 1
ABDOMINAL PAIN: 1
VOMITING: 0

## 2017-07-26 NOTE — IP AVS SNAPSHOT
MRN:[id number]                      After Visit Summary   7/26/2017    Junior Patricia    MRN: [id number]           Thank you!     Thank you for choosing [facility] for your care. Our goal is always to provide you with excellent care. Hearing back from our patients is one way we can continue to improve our services. Please take a few minutes to complete the written survey that you may receive in the mail after you visit. If you would like to speak to someone directly about your visit please contact Patient Relations at [phone]. Thank you!          Patient Information     Date Of Birth          1950        Designated Caregiver       Most Recent Value    Caregiver    Will someone help with your care after discharge? yes    Name of designated caregiver Nalee    Phone number of caregiver [phone]    Caregiver address Same as pt      About your hospital stay     You were admitted on:  July 26, 2017 You last received care in the:  [place] [address] [facility]    You were discharged on:  July 28, 2017       Who to Call     For medical emergencies, please call 911.  For non-urgent questions about your medical care, please call your primary care provider or clinic, None          Attending Provider     Provider Specialty    Denny Perea MD Emergency Medicine    [place], Sameer Forrest MD Emergency Medicine    Jey Gordon,  Internal Medicine       Primary Care Provider    Clinic [facility]      After Care Instructions     Activity       Your activity upon discharge: activity as tolerated            Diet       Follow this diet upon discharge: Regular                  Follow-up Appointments     Follow-up and recommended labs and tests        Follow up with primary care provider, Clinic [facility], within 7 days for hospital follow- up.  The following labs/tests are recommended: BMP in 7 days.                  Your next 10 appointments already  scheduled     Aug 04, 2017  8:15 AM CDT   Nurse Only with CR BRONZE [facility]LPN   [facility]    [address] [address] [phone]            Aug 04, 2017  8:30 AM CDT   LAB with CR LAB   [facility]    [address] [address] [phone]           Patient must bring picture ID. Patient should be prepared to give a urine specimen  Please do not eat 10-12 hours before your appointment if you are coming in fasting for labs on lipids, cholesterol, or glucose (sugar). Pregnant women should follow their Care Team instructions. Water with medications is okay. Do not drink coffee or other fluids. If you have concerns about taking  your medications, please ask at office or if scheduling via [employer], send a message by clicking on Secure Messaging, Message Your Care Team.              Pending Results     Date and Time Order Name Status Description    7/27/2017 1447 CMV DNA quantification In process     7/27/2017 1217 Blood culture Preliminary     7/27/2017 1217 Blood culture Preliminary     7/26/2017 1211 Mycophenolic acid In process     7/26/2017 0839 Blood culture Preliminary     7/26/2017 0839 Blood culture Preliminary             Statement of Approval     Ordered          07/28/17 1159  I have reviewed and agree with all the recommendations and orders detailed in this document.  EFFECTIVE NOW     Approved and electronically signed by:  Toni Gonzalez DO             Admission Information     Date & Time Provider Department Dept. Phone    7/26/2017 Jey Gordon DO [place] [address] [facility] [phone]      Your Vitals Were     Blood Pressure Pulse Temperature Respirations Height Weight    129/76 (BP Location: Right arm) 81 97.3  F (36.3  C) (Oral) 18 1.524 m (5') 86 kg (189 lb 11.2 oz)    Pulse Oximetry BMI (Body Mass Index)                98%  "37.05 kg/m2          Glarity Information     Glarity lets you send messages to your doctor, view your test results, renew your prescriptions, schedule appointments and more. To sign up, go to [email or web address] . Click on \"Log in\" on the left side of the screen, which will take you to the Welcome page. Then click on \"Sign up Now\" on the right side of the page.     You will be asked to enter the access code listed below, as well as some personal information. Please follow the directions to create your username and password.     Your access code is: [email or web address]  Expires: 2017 12:04 PM     Your access code will  in 90 days. If you need help or a new code, please call your [facility] clinic or [phone].        Care EveryWhere ID     This is your Care EveryWhere ID. This could be used by other organizations to access your [facility] medical records  [id number]        Equal Access to Services     [place] : Hadii sherry ortezo Sosommerali, waaxda luqadaha, qaybta kaalmada adeegyaserge, rigoberto sears . So [facility] [phone].    ATENCIÓN: Si habla español, tiene a murdock disposición servicios gratuitos de asistencia lingüística. Alcides al [phone].    We comply with applicable federal civil rights laws and [place] laws. We do not discriminate on the basis of race, color, national origin, age, disability sex, sexual orientation or gender identity.               Review of your medicines      CONTINUE these medicines which have NOT CHANGED        Dose / Directions    ACE/ARB NOT PRESCRIBED (INTENTIONAL)   Used for:  Kidney replaced by transplant, CKD (chronic kidney disease) stage 3, GFR 30-59 ml/min        by Other route continuous prn.   Refills:  0       albuterol 108 (90 BASE) MCG/ACT Inhaler   Commonly known as:  PROAIR HFA/PROVENTIL HFA/VENTOLIN HFA   Used for:  Asthma exacerbation        Dose:  2 puff   Inhale 2 puffs into the lungs every 6 hours as needed for shortness of " breath / dyspnea or wheezing   Quantity:  1 Inhaler   Refills:  0       aspirin 81 MG EC tablet        Dose:  81 mg   Take 81 mg by mouth daily   Refills:  0       CELLCEPT tablet        Dose:  1000 mg   Take 1,000 mg by mouth 2 times daily   Refills:  0       * cycloSPORINE modified 25 MG capsule   Commonly known as:  GENERIC EQUIVALENT        Dose:  50 mg   Take 50 mg by mouth every morning   Refills:  0       * cycloSPORINE modified 25 MG capsule   Commonly known as:  GENERIC EQUIVALENT        Dose:  25 mg   Take 25 mg by mouth every evening   Refills:  0       DILTIAZEM  MG 24 hr capsule   Generic drug:  diltiazem        Dose:  120 mg   Take 120 mg by mouth daily   Refills:  0       metoprolol 25 MG tablet   Commonly known as:  LOPRESSOR        Dose:  12.5 mg   Take 12.5 mg by mouth 2 times daily   Refills:  0       predniSONE 5 MG tablet   Commonly known as:  DELTASONE        Dose:  5 mg   Take 5 mg by mouth daily   Refills:  0       * Notice:  This list has 2 medication(s) that are the same as other medications prescribed for you. Read the directions carefully, and ask your doctor or other care provider to review them with you.             Protect others around you: Learn how to safely use, store and throw away your medicines at www.disposemymeds.org.             Medication List: This is a list of all your medications and when to take them. Check marks below indicate your daily home schedule. Keep this list as a reference.      Medications           Morning Afternoon Evening Bedtime As Needed    ACE/ARB NOT PRESCRIBED (INTENTIONAL)   by Other route continuous prn.                                albuterol 108 (90 BASE) MCG/ACT Inhaler   Commonly known as:  PROAIR HFA/PROVENTIL HFA/VENTOLIN HFA   Inhale 2 puffs into the lungs every 6 hours as needed for shortness of breath / dyspnea or wheezing                                   aspirin 81 MG EC tablet   Take 81 mg by mouth daily                                    CELLCEPT tablet   Take 1,000 mg by mouth 2 times daily                                      * cycloSPORINE modified 25 MG capsule   Commonly known as:  GENERIC EQUIVALENT   Take 50 mg by mouth every morning                                   * cycloSPORINE modified 25 MG capsule   Commonly known as:  GENERIC EQUIVALENT   Take 25 mg by mouth every evening                                   DILTIAZEM  MG 24 hr capsule   Take 120 mg by mouth daily   Last time this was given:  120 mg on 7/28/2017  8:16 AM   Generic drug:  diltiazem                                      metoprolol 25 MG tablet   Commonly known as:  LOPRESSOR   Take 12.5 mg by mouth 2 times daily   Last time this was given:  12.5 mg on 7/28/2017  8:16 AM                                      predniSONE 5 MG tablet   Commonly known as:  DELTASONE   Take 5 mg by mouth daily   Last time this was given:  5 mg on 7/28/2017  8:16 AM                                   * Notice:  This list has 2 medication(s) that are the same as other medications prescribed for you. Read the directions carefully, and ask your doctor or other care provider to review them with you.              More Information        Understanding Norovirus  Norovirus is a virus that can infect the stomach and intestines. It is the most common cause of diarrhea and vomiting. The virus spreads easily in areas of close human contact, such as schools and cruise ships.  What causes norovirus infection?  You can be infected with norovirus by coming into contact with a person who has the virus or by touching a contaminated surface. Norovirus is very contagious. Washing your hands well can lower your risk of getting the virus.  You can also get it by consuming food and water contaminated with the virus. Foods most likely to become tainted include:    Shellfish    Ready-to-eat salads and sandwiches    Produce such as celery, melons, and leafy vegetables  What are the symptoms of norovirus?  Some  people may have no symptoms. In people who do, symptoms show up suddenly,  typically  within a day of being exposed. The illness lasts 1 to 3 days. Symptoms include:    Fever    Diarrhea    Nausea    Vomiting    Headache    Achiness    Stomach cramping  More severe cases are often seen in infants, older adults, and people with other health problems. Symptoms may last longer and be more severe in these groups.  How is  norovirus treated?  There is no medicine to cure norovirus. Treatment includes:    Rest. You may feel better faster if you get plenty of rest.    Fluids. Drinking lots of fluids will help you stay hydrated. Don t drink alcohol or beverages with caffeine. They can make your symptoms worse.    Medicine. Over-the-counter medicines for diarrhea may ease symptoms. These should be used only by adults. Pain relievers, such as acetaminophen or ibuprofen, can help with headaches and body aches.  What are the possible complications of norovirus?  Dehydrationis the main concern with norovirus infection. Severe dehydration may need to be treated in the hospital. You may need to get fluids through an IV (directly into a vein).  When should I call my  healthcare provider?  Call your healthcare provider right away if you have any of these:    Fever of 100.4 F (38 C) or higher, or as directed    Belly (abdominal) pain that gets worse    Severe dizziness, especially when getting up from bed    Vomiting so severe that you can t keep fluids down   Date Last Reviewed: 3/28/2016    8748-7654 [employer]. [address] [place] [address], [place] [address]. All rights reserved. This information is not intended as a substitute for professional medical care. Always follow your healthcare professional's instructions.

## 2017-07-26 NOTE — ED NOTES
Pt complains of sudden onset of epigastric pain and diarrhea at midnight after eating pork and noodles.

## 2017-07-26 NOTE — PHARMACY-ADMISSION MEDICATION HISTORY
Admission medication history interview status for this patient is complete. See [facility] admission navigator for allergy information, prior to admission medications and immunization status.     Medication history interview source(s):Patient  Medication history resources (including written lists, pill bottles, clinic record): Home med list, verified medications with [facility] system  Primary [facility]: [facility]    Changes made to PTA medication list:  Added: prednisone, diltiazem  Deleted: albuterol neb, atorvastatin, colchicine, prednisone taper, vitamin D  Changed: aspirin 1 tab twice weekly --> daily, cyclosporine 150mg daily --> 50mg qam & 25mg qpm, metoprolol 25mg BID --> 12.5mg BID    Actions taken by pharmacist (provider contacted, etc):None     Additional medication history information:None    Medication reconciliation/reorder completed by provider prior to medication history? No    Do you take OTC medications (eg tylenol, ibuprofen, fish oil, eye/ear drops, etc)? Y - aspirin (Y/N)    For patients on insulin therapy: N (Y/N)  Lantus/levemir/NPH/Mix 70/30 dose:   (Y/N) (see below)   Sliding scale Novolog Y/N  If Yes, do you have a baseline novolog pre-meal dose:  units with meals   Patients eat three meals a day:   Y/N     Any Barriers to therapy:  cost of medications/comfortable with giving injections (if applicable)/ comfortable and confident with current diabetes regimen:       Prior to Admission medications    Medication Sig Last Dose Taking? Auth Provider   predniSONE (DELTASONE) 5 MG tablet Take 5 mg by mouth daily 7/25/2017 at Unknown time Yes Unknown, Entered By History   aspirin 81 MG EC tablet Take 81 mg by mouth daily 7/25/2017 at Unknown time Yes Unknown, Entered By History   diltiazem (DILTIAZEM CD) 120 MG 24 hr capsule Take 120 mg by mouth daily 7/25/2017 at Unknown time Yes Unknown, Entered By History   cycloSPORINE modified (GENERIC EQUIVALENT) 25 MG capsule Take 50 mg by mouth every morning  7/25/2017 at am Yes Unknown, Entered By History   cycloSPORINE modified (GENERIC EQUIVALENT) 25 MG capsule Take 25 mg by mouth every evening 7/25/2017 at pm Yes Unknown, Entered By History   metoprolol (LOPRESSOR) 25 MG tablet Take 12.5 mg by mouth 2 times daily  7/25/2017 at pm Yes Reported, Patient   mycophenolate (CELLCEPT) 500 MG tablet Take 1,000 mg by mouth 2 times daily  7/25/2017 at pm Yes Reported, Patient   albuterol (PROAIR HFA/PROVENTIL HFA/VENTOLIN HFA) 108 (90 BASE) MCG/ACT Inhaler Inhale 2 puffs into the lungs every 6 hours as needed for shortness of breath / dyspnea or wheezing More than a month at Unknown time  Andreas Mobley MD   ACE/ARB NOT PRESCRIBED, INTENTIONAL, by Other route continuous prn. Unknown at Unknown time  Adiel Prakash MD

## 2017-07-26 NOTE — IP AVS SNAPSHOT
[place] [address] [facility]    [address]    [facility] [address]    Phone:  [phone]    Fax:  [phone]                                       After Visit Summary   7/26/2017    Junior Patricia    MRN: [id number]           After Visit Summary Signature Page     I have received my discharge instructions, and my questions have been answered. I have discussed any challenges I see with this plan with the nurse or doctor.    ..........................................................................................................................................  Patient/Patient Representative Signature      ..........................................................................................................................................  Patient Representative Print Name and Relationship to Patient    ..................................................               ................................................  Date                                            Time    ..........................................................................................................................................  Reviewed by Signature/Title    ...................................................              ..............................................  Date                                                            Time

## 2017-07-26 NOTE — PLAN OF CARE
Problem: Goal Outcome Summary  Goal: Goal Outcome Summary  Outcome: Improving  Pt A&Ox4, up with SBA in room. Pt independent at home but noted generalized weakness since having frequent diarrhea stools. No stools since arriving on unit. IV access removed as previous site infiltrated, RN flyer replaced IV. On tele,

## 2017-07-26 NOTE — ED NOTES
[facility]  ED Nurse Handoff Report    Junior Patricia is a 67 year old male   ED Chief complaint: No chief complaint on file.  . ED Diagnosis:   Final diagnoses:   Colitis   Immunocompromised state (H)   Sepsis, due to unspecified organism (H)     Allergies:   Allergies   Allergen Reactions     No Known Drug Allergies      Simvastatin Itching       Code Status: Full Code  Activity level - Baseline/Home:  Independent. Activity Level - Current:   Stand with Assist. Lift room needed: No. Bariatric: No   Needed: No   Isolation: Yes. Infection: Not Applicable  C-Diff Pending.     Vital Signs:   Vitals:    07/26/17 0645 07/26/17 0700 07/26/17 0715 07/26/17 0730   BP: 126/80 122/85 129/75 118/77   Pulse:       Resp:       Temp:  103.1  F (39.5  C)     TempSrc:  Temporal     SpO2: 96% 97% 98% 96%       Cardiac Rhythm:  ,      Pain level: 0-10 Pain Scale: 0  Patient confused: No. Patient Falls Risk: Yes.   Elimination Status: Has voided   Patient Report - Initial Complaint: abdominal pain. Focused Assessment:  Gastrointestinal - All Quadrants Bowel Sounds: hyperactive All Quadrants Palpation: taut Gastrointestinal Comment: Pt reports sudden onset abdominal.   Tests Performed: US/CT/labs. Abnormal Results: . Abnormal Result -   Sodium: 136 mmol/L [Ref Range: 133 - 144]  Potassium: 5.4 mmol/L [Ref Range: 3.4 - 5.3] (Specimen slightly hemolyzed, potassium may be falsely elevated)  Chloride: 110 mmol/L [Ref Range: 94 - 109]  Carbon Dioxide: 22 mmol/L [Ref Range: 20 - 32]  Anion Gap: 4 mmol/L [Ref Range: 3 - 14]  Glucose: 155 mg/dL [Ref Range: 70 - 99]  Urea Nitrogen: 26 mg/dL [Ref Range: 7 - 30]  Creatinine: 1.46 mg/dL [Ref Range: 0.66 - 1.25]  GFR Estimate: 48 mL/min/1.7m2 [Ref Range: >60] (Non  GFR Calc)  GFR Estimate If Black: 58 mL/min/1.7m2 [Ref Range: >60] ( GFR Calc)  Calcium: 8.0 mg/dL [Ref Range: 8.5 - 10.1]  Bilirubin Total: 1.1 mg/dL [Ref Range: 0.2 -  1.3]  Albumin: 2.9 g/dL [Ref Range: 3.4 - 5.0]  Protein Total: 6.6 g/dL [Ref Range: 6.8 - 8.8]  Alkaline Phosphatase: 222 U/L [Ref Range: 40 - 150]  ALT: 98 U/L [Ref Range: 0 - 70]  AST: 91 U/L [Ref Range: 0 - 45]  Collected: 7/26/2017 04:55Abnormal Result -   Lactic Acid: 2.4 mmol/L [Ref Range: 0.7 - 2.1]  Collected: 7/26/2017 04:55  Treatments provided: IVF, pain/fever control  Family Comments: wife present  OBS brochure/video discussed/provided to patient:  N/A  ED Medications:   Medications   lidocaine 1 % 1 mL (not administered)   lidocaine (LMX4) kit (not administered)   sodium chloride (PF) 0.9% PF flush 3 mL (not administered)   sodium chloride (PF) 0.9% PF flush 3 mL (not administered)   0.9% sodium chloride BOLUS (0 mLs Intravenous Stopped 7/26/17 0717)     Followed by   0.9% sodium chloride infusion (1,000 mLs Intravenous New Bag 7/26/17 0718)   ondansetron (ZOFRAN) injection 4 mg (4 mg Intravenous Given 7/26/17 0500)   HYDROmorphone (PF) (DILAUDID) injection 0.5 mg (0.5 mg Intravenous Given 7/26/17 0501)   iohexol (OMNIPAQUE) solution 25 mL (25 mLs Oral Given 7/26/17 0511)   acetaminophen (TYLENOL) tablet 1,000 mg (1,000 mg Oral Given 7/26/17 0717)     Drips infusing:  Yes  For the majority of the shift this patient was Yellow. Interventions performed were n/a.     Severe Sepsis OR Septic Shock Diagnosis Present: No      ED Nurse Name/Phone Number: Robina Gomez,   9:22 AM      RECEIVING UNIT ED HANDOFF REVIEW    Above ED Nurse Handoff Report was reviewed: Yes  Reviewed by: Laxmi Feliz on July 26, 2017 at 10:21 AM

## 2017-07-26 NOTE — ED PROVIDER NOTES
[facility] Emergency Medicine Sign-out Note:       HPI:  This is a 67 year old male who was signed out to me by Dr. Perea pending reassessment and ultrsound.  Briefly, patient presented with abd pain, diarrehea, perhaps 10 which were nonbloody or non-mucus. No N/V.  The workup here was significant for normal bili but elevated transaminases.   I was asked to reassess the patient and follow-up on ultrasound, reassessment, disposition.    RESULTS:   Results for orders placed or performed during the hospital encounter of 07/26/17 (from the past 24 hour(s))   EKG 12 lead     Status: None (Preliminary result)    Collection Time: 07/26/17  4:29 AM   Result Value Ref Range    Interpretation ECG Click View Image link to view waveform and result    Comprehensive metabolic panel     Status: Abnormal    Collection Time: 07/26/17  4:55 AM   Result Value Ref Range    Sodium 136 133 - 144 mmol/L    Potassium 5.4 (H) 3.4 - 5.3 mmol/L    Chloride 110 (H) 94 - 109 mmol/L    Carbon Dioxide 22 20 - 32 mmol/L    Anion Gap 4 3 - 14 mmol/L    Glucose 155 (H) 70 - 99 mg/dL    Urea Nitrogen 26 7 - 30 mg/dL    Creatinine 1.46 (H) 0.66 - 1.25 mg/dL    GFR Estimate 48 (L) >60 mL/min/1.7m2    GFR Estimate If Black 58 (L) >60 mL/min/1.7m2    Calcium 8.0 (L) 8.5 - 10.1 mg/dL    Bilirubin Total 1.1 0.2 - 1.3 mg/dL    Albumin 2.9 (L) 3.4 - 5.0 g/dL    Protein Total 6.6 (L) 6.8 - 8.8 g/dL    Alkaline Phosphatase 222 (H) 40 - 150 U/L    ALT 98 (H) 0 - 70 U/L    AST 91 (H) 0 - 45 U/L   Lipase     Status: None    Collection Time: 07/26/17  4:55 AM   Result Value Ref Range    Lipase 153 73 - 393 U/L   Lactic acid whole blood     Status: Abnormal    Collection Time: 07/26/17  4:55 AM   Result Value Ref Range    Lactic Acid 2.4 (H) 0.7 - 2.1 mmol/L   CBC + differential     Status: Abnormal    Collection Time: 07/26/17  5:41 AM   Result Value Ref Range    WBC 5.0 4.0 - 11.0 10e9/L    RBC Count 3.86 (L) 4.4 - 5.9 10e12/L    Hemoglobin 13.0 (L) 13.3  - 17.7 g/dL    Hematocrit 42.0 40.0 - 53.0 %     (H) 78 - 100 fl    MCH 33.7 (H) 26.5 - 33.0 pg    MCHC 31.0 (L) 31.5 - 36.5 g/dL    RDW 14.1 10.0 - 15.0 %    Platelet Count 51 (L) 150 - 450 10e9/L    Diff Method Automated Method     % Neutrophils 80.2 %    % Lymphocytes 6.8 %    % Monocytes 12.0 %    % Eosinophils 0.6 %    % Basophils 0.2 %    % Immature Granulocytes 0.2 %    Nucleated RBCs 0 0 /100    Absolute Neutrophil 4.0 1.6 - 8.3 10e9/L    Absolute Lymphocytes 0.3 (L) 0.8 - 5.3 10e9/L    Absolute Monocytes 0.6 0.0 - 1.3 10e9/L    Absolute Eosinophils 0.0 0.0 - 0.7 10e9/L    Absolute Basophils 0.0 0.0 - 0.2 10e9/L    Abs Immature Granulocytes 0.0 0 - 0.4 10e9/L    Absolute Nucleated RBC 0.0      CT Abdomen Pelvis w/o Contrast   Final Result   IMPRESSION:   1. Mesenteric inflammation and mild ascites not evident on prior exam   along with possible colonic wall thickening from the cecum through the   transverse colon concerning for an infectious or inflammatory colitis.   Correlate with patient's clinical picture. Typhlitis remains in the   differential in this immunocompromised patient.   2. New cirrhotic liver morphology. Ascites may be related to chronic   liver dysfunction.   3. Transplant kidney left iliac fossa appears normal. No   peritransplant fluid collection. No hydronephrosis. Native kidneys are   atrophic and unchanged.   4. Gallbladder is mildly prominent but unchanged compared to prior   exam. No calcified gallstones. The inflammation in the abdomen appears   more diffusely spread rather than simply around the gallbladder   itself. Followup gallbladder ultrasound for further assessment if   clinically warranted.      CRISTOFER KEY MD      US Abdomen Limited   Final Result   IMPRESSION:   1. Cholelithiasis. There is no biliary dilatation or evidence of   cholecystitis.   2. Cirrhotic liver.      HAZEL LYNNE MD           Exam: A brief, limited, pertinent physical exam was performed after  results obtained, exam is below.     Vitals:    Patient Vitals for the past 24 hrs:   BP Temp Temp src Pulse Heart Rate Resp SpO2   07/26/17 0730 118/77 - - - - - 96 %   07/26/17 0715 129/75 - - - - - 98 %   07/26/17 0700 122/85 103.1  F (39.5  C) Temporal - - - 97 %   07/26/17 0645 126/80 - - - - - 96 %   07/26/17 0545 134/89 - - - - - 97 %   07/26/17 0530 (!) 147/91 - - - - - 96 %   07/26/17 0515 138/86 - - - - - 96 %   07/26/17 0512 130/76 - - - - - 97 %   07/26/17 0416 121/82 98.3  F (36.8  C) Temporal 81 81 18 98 %      Gen: Patient is alert and interactive. Feels warm to the touch.  Abdominal: Now has no abdominal pain. Feels much better. No tenderness to palpation. No kidney transplant tenderness in the left lower quadrant.      ED Course:    0603: I assumed care of the patient after sign out from Dr. Perea.    0700: Recheck patient. Repeat temperature reveals 103.1 F. Further labs ordered.    0831: Recheck patient. He denies any bloody in his stools nor any vomiting.    SEVERE SEPSIS and SEPTIC SHOCK EARLY MANAGEMENT BUNDLE    SEVERE SEPSIS:     Junior Patricia meets criteria for severe sepsis as evidenced by:     1. 2 SIRS criteria, AND    2. Suspected infection, AND     3.  ACUTE Organ dysfunction:  SBP <90 or MAP < 65, Lactic Acid >2,        Cr >2, Plt count < 100k, Bilirubin > 2, INR >1.5     Time severe sepsis present = 0700    SEPTIC SHOCK:     Junior Patricia does not meet criteria for septic shock (hypotension despite adequate fluid administration or Lactic Acid >4).        3 Hour Bundle Completion (Severe Sepsis and Septic Shock):  1. Initial Lactic Acid Result: 0620  2. Blood Cultures before Antibiotics: Yes  3. Broad Spectrum Antibiotics Administered: Yes       Anti-infectives (Future)    Start     Dose/Rate Route Frequency Ordered Stop    07/26/17 0959  ertapenem (INVanz) 1 g vial to attach to  mL bag      1 g  over 30 Minutes Intravenous ONCE 07/26/17 0957          4. Administer 30 mL/kg for  hypotension or Lactate > 4 mmol/L: Not applicable    Findings and plan explained to the Patient who consents to admission.             0853: Discussed the patient with XIOMARA Sagastume accepting on behalf of Dr. Gordon, who will admit the patient to a medical bed for further monitoring, evaluation, and treatment.      Medical Decision Making:   Junior Patricia is a 67 year old male who presents for evaluation of abdominal pain, diarrhea. They were seen initially by Dr. Perea with a work up as above. Interestingly, I went back to examine him and in a detailed exam I noted him to be hot. A temperature was checked and a fever was subsequently seen at a 103 F. He abdomen feels better but given the CT findings I am going to admit him to medicine for further cares. By criteria, at 0700 with a fever of 103 F he would meet SIRS criteria. His lactate was already rechecked and was negative. Given the lack of blood in the stools, I would not necessarily start antibiotics until I have a stool culture result as the differential for the colitis could include an ischemic, viral, or bacterial cause. He is also at risk for fungal causes given his immunocompromised state. I will admit to medicine for further evaluation. Otherwise his blood work is reassuring. I did use a CT without contrast given his elevated creatinine and single transplanted kidney. Consideration of smouldering cholecystitis is also in the differential.    UPDATE:  His delta-CMP is concerning for a biliary process with now elevation in bilirubin. I would therefore now start abx. Discussed w/ XIOMARA Quiros and updated on plan    Impression:    ICD-10-CM    1. Colitis K52.9 Blood culture     Blood culture     Comprehensive metabolic panel   2. Immunocompromised state (H) D84.9    3. Sepsis, due to unspecified organism (H) A41.9            Sameer Donovan MD  Emergency Medicine Physician  [facility]  [facility]         Sameer Donovan  MD Lon  07/26/17 1007

## 2017-07-26 NOTE — H&P
History and Physical     Junior Patricia MRN# [id number]   YOB: 1950 Age: 67 year old      Date of Admission:  7/26/2017    Primary care provider: [facility]          Assessment and Plan:   Junior Patricia is a 67 year old male with a PMH significant for s/p renal transplant in 2004, untreated Hepatitis B, DM, CKD, HTN and HLP, who presents with abd pain and diarrhea.     1. Colitis - likely infectious, viral. CT scan suggest Typhlitis, pt is immunocompromised but is not neutropenic, will consider in differential. Stool sample pending for c diff and enteric panel, consider CMV in transplant pt. Febrile here up to 103.1, WBC normal. Lactic acid mildly elevated at 2.4, resolved with IVFs. Continue supportive cares for now and hold off on abx. Possible gallbladder pathology, prominent gallbladder but appears unchanged from previous images. Monitor sx, IVFs, and antiemetics. Enteric precautions. Low threshold to start abx given immunocompromised state.  Addendum: ED rechecked CMP and bilirubin elevated mildly so gave dose of abx. This elevation is likely not significant and will continue to hold off on abx.   2. 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 Untreated. Pt is not interested in following up with liver specialist.   3. S/p renal transplant - 2004. Has been doing well. Cr 1.49 today which appears baseline. Avoid nephrotoxins.   4. DM - HgbA1c 7.0 on 4/5. Not currently on meds, monitor glucose  5. HTN - resume home meds    Prophylaxis - mechanical  Code status - Full Code  Dispo - will require at least 2 nights given diarrhea and immunocompromised state                Chief Complaint:   abd pain, diarrhea         History of Present Illness:   Junior Patricia is a 67 year old male with a PMH significant for s/p renal transplant in 2004, untreated Hepatitis B, DM, CKD, HTN and HLP, who presents with abd pain and diarrhea. Pt is a poor historian. Pt states he developed multiple episodes of non-bloody  diarrhea starting at midnight. He estimates ~10 loose stools since that time. He also endorses diffuse abdominal pain and subjective fevers. He denies nausea, vomiting or dysuria. He further denies chest pain, SOB, cough or dysuria. He denies change to urination. He denies recent travel, antibiotic use or recent ill contacts. When questioned about his Hepatitis B, he states he saw a liver specialist ~10 yrs ago, he did not recommend treatment at that time, per pt, and pt was not interested in treatment.   In the ED, VS initially stable. Did spike a temp up to 103.1, other VS remained stable. CMP - K 5.4, Cr 1.46, alk phos 222, ALT 98 and AST 91. Glucose 155. Lactic acid 2.4, lipase 153. CBC - WBC 5.0, Hgb 13.0, platelets 51. Blood cultures pending. RUQ US shows cholelithiasis, no evidence of cholecystitis. CT abd/pelvis shows mesenteric inflammation and mild ascites concerning for infectiou or inrflammatory colitis, Typhlitis not ruled out, new cirrhosis, transplanted kidney in left iliac fossa and mildly prominent gallbladder without definite evidence of cholecystitis. CMP was rechecked after IVFs, K improved to within normal limits, 4.4, LFTs improved except for bilirubin elevated to 2.5. Lactic acid improved to 2.1 after IVFs. Given the elevation in bilirubin, pt received IV Invanz in the ED. He will be admitted for further management of colitis.     Hx obtained by speaking with ED physician, chart review and pt interview.               Past Medical History:     Past Medical History:   Diagnosis Date     CKD (chronic kidney disease) stage 3, GFR 30-59 ml/min 4/28/2011     Colon polyps 5/24/2011     DM type 2, goal A1C below 8.0 8/4/2014     Elevated LFT's 5/11/2011     GOUT NOS 9/20/2007     Hyperlipidemia LDL goal <100 10/31/2010     Kidney replaced by transplant     done [facility]     Macrocytosis without anemia 2/26/2016     Nephrolithiasis      Obesity, unspecified                Past Surgical  History:     Past Surgical History:   Procedure Laterality Date     KNEE SURGERY       TRANSPLANT KIDNEY RECIPIENT LIVING RELATED      uric acid nephropathy               Social History:     Social History     Social History     Marital status:      Spouse name: N/A     Number of children: N/A     Years of education: N/A     Occupational History     Not on file.     Social History Main Topics     Smoking status: Never Smoker     Smokeless tobacco: Never Used     Alcohol use No     Drug use: No     Sexual activity: Yes     Partners: Female     Other Topics Concern     Not on file     Social History Narrative               Family History:   Reviewed and non contributory         Allergies:      Allergies   Allergen Reactions     No Known Drug Allergies      Simvastatin Itching               Medications:     Prior to Admission medications    Medication Sig Last Dose Taking? Auth Provider   predniSONE (DELTASONE) 20 MG tablet Take 3 tabs (60 mg) by mouth daily x 3 days, 2 tabs (40 mg) daily x 3 days, 1 tab (20 mg) daily x 3 days, then 1/2 tab (10 mg) x 3 days.   Dee De La Rosa,    albuterol (2.5 MG/3ML) 0.083% neb solution Take 1 vial (2.5 mg) by nebulization every 6 hours as needed for shortness of breath / dyspnea or wheezing   Dee De La Rosa DO   albuterol (PROAIR HFA/PROVENTIL HFA/VENTOLIN HFA) 108 (90 BASE) MCG/ACT Inhaler Inhale 2 puffs into the lungs every 6 hours as needed for shortness of breath / dyspnea or wheezing   Andreas Mobley MD   metoprolol (LOPRESSOR) 25 MG tablet Take 25 mg by mouth 2 times daily   Reported, Patient   colchicine (COLCRYS) 0.6 MG tablet 1 qd   Levar Altamirano MD   atorvastatin (LIPITOR) 10 MG tablet Take 10 mg by mouth daily.   Reported, Patient   ACE/ARB NOT PRESCRIBED, INTENTIONAL, by Other route continuous prn.   Adiel Prakash MD   mycophenolate (CELLCEPT) 500 MG tablet Take  by mouth 2 times daily. 2 tabs twice daily   Reported, Patient    VITAMIN D PO Take  by mouth.   Reported, Patient   ASPIRIN 81 MG OR TABS 1 tablet twice weekly    Reported, Patient   GENGRAF 25 MG OR CAPS 6 cap daily   Reported, Patient              Review of Systems:   A Comprehensive greater than 10 system review of systems was carried out.  Pertinent positives and negatives are noted above.  Otherwise negative for contributory information.     Review Of Systems  Skin: negative  Eyes: negative  Ears/Nose/Throat: negative  Respiratory: No shortness of breath, dyspnea on exertion, cough, or hemoptysis  Cardiovascular: negative  Gastrointestinal: negative  Genitourinary: negative  Musculoskeletal: negative  Neurologic: negative  Psychiatric: negative  Hematologic/Lymphatic/Immunologic: negative  Endocrine: negative             Physical Exam:   Blood pressure 118/77, pulse 81, temperature 103.1  F (39.5  C), temperature source Temporal, resp. rate 18, SpO2 96 %.  Exam:  GENERAL:  Comfortable.  PSYCH: pleasant, oriented, No acute distress.  HEENT:  Atraumatic, normocephalic. Normal conjunctiva, normal hearing, and oropharynx is normal.  NECK:  Supple, no neck vein distention  HEART:  Normal S1, S2 with no murmur, no pericardial rub, gallops or S3 or S4.  LUNGS:  Clear to auscultation, normal Respiratory effort. No wheezing, rales or ronchi.  GI:  Soft, normal bowel sounds. Non-tender, non distended.   EXTREMITIES:  No pedal edema, +2 pulses bilateral and equal.  SKIN:  Dry to touch, No rash, wound or ulcerations.  NEUROLOGIC:  CN 2-12 intact, BL 5/5 symmetric upper and lower extremity strength, sensation is intact with no focal deficits.               Data:       Recent Labs  Lab 07/26/17  0541   WBC 5.0   HGB 13.0*   HCT 42.0   *   PLT 51*       Recent Labs  Lab 07/26/17  0915 07/26/17  0455    136   POTASSIUM 4.4 5.4*   CHLORIDE 113* 110*   CO2 18* 22   ANIONGAP 7 4   * 155*   BUN 29 26   CR 1.49* 1.46*   GFRESTIMATED 47* 48*   GFRESTBLACK 57* 58*   AMARILYS 7.4*  8.0*   PROTTOTAL 5.4* 6.6*   ALBUMIN 2.5* 2.9*   BILITOTAL 1.8* 1.1   ALKPHOS 157* 222*   AST 62* 91*   ALT 79* 98*       Recent Labs  Lab 07/26/17  0920 07/26/17  0915   CULT No growth after 3 hours No growth after 3 hours     Lactic acid - 2.4, 2.1    Recent Labs  Lab 07/26/17  0455   LIPASE 153       Recent Results (from the past 48 hour(s))   US Abdomen Limited    Narrative    US ABDOMEN LIMITED  7/26/2017 6:15 AM      HISTORY: Right upper quadrant pain.     COMPARISON: None.    FINDINGS: The liver has a nodular contour consistent with cirrhosis.  No focal mass. There is no intra or extrahepatic biliary dilatation.  The common hepatic duct measures 0.3 cm. There are multiple stones in  the gallbladder. The gallbladder otherwise appears normal. The  pancreas is completely obscured by bowel gas. The right kidney has  been removed. The proximal abdominal aorta and IVC appear normal.      Impression    IMPRESSION:  1. Cholelithiasis. There is no biliary dilatation or evidence of  cholecystitis.  2. Cirrhotic liver.    HAZEL LYNNE MD   CT Abdomen Pelvis w/o Contrast    Narrative    CT ABDOMEN AND PELVIS WITHOUT CONTRAST  7/26/2017 7:53 AM     HISTORY: Immunocompromised (renal transplant), now with fever and  abdominal pain. Evaluate for cholecystitis, diverticulitis, other  intraabdominal infection.    COMPARISON: CT abdomen and pelvis 2/22/2012.    TECHNIQUE: Axial images are obtained from the lung bases to the  symphysis without oral or IV contrast.  Coronal reformatted images are  also generated.  Radiation dose for this scan was reduced using  automated exposure control, adjustment of the mA and/or kV according  to patient size, or iterative reconstruction technique.    FINDINGS: The lung bases are clear. Coronary artery calcification is  noted.    Abdomen: There is slight nodularity to the liver contour suggesting  chronic liver disease. New perihepatic and perisplenic ascites is  present. Gallbladder is  mildly distended. No obvious calcified  gallstone. Mesenteric inflammation and stranding is present throughout  the mid and upper abdomen. Native kidneys are atrophic without  hydronephrosis. Large stone is noted in the proximal left native  ureter on series 2, image 42 which is stable. Fluid tracks down the  paracolic gutters bilaterally. Fat-containing umbilical hernia is  present. There is questionable wall thickening involving the colon  from the cecum through the mid transverse colon, possibly indicating  an infectious or inflammatory colitis. Correlate clinically. No  enlarged abdominal lymph nodes are appreciated. Scattered small  mesenteric nodes are likely reactive. Aorta is calcified without  evidence of aneurysm.    Pelvis: The bladder and rectum are unremarkable. Prostate gland is  normal in size. Transplant kidney is present in the left iliac fossa  without hydronephrosis or renal calculi. No enlarged pelvic lymph  nodes or significant free pelvic fluid. Bone window examination  demonstrates degenerative spine changes.      Impression    IMPRESSION:  1. Mesenteric inflammation and mild ascites not evident on prior exam  along with possible colonic wall thickening from the cecum through the  transverse colon concerning for an infectious or inflammatory colitis.  Correlate with patient's clinical picture. Typhlitis remains in the  differential in this immunocompromised patient.  2. New cirrhotic liver morphology. Ascites may be related to chronic  liver dysfunction.  3. Transplant kidney left iliac fossa appears normal. No  peritransplant fluid collection. No hydronephrosis. Native kidneys are  atrophic and unchanged.  4. Gallbladder is mildly prominent but unchanged compared to prior  exam. No calcified gallstones. The inflammation in the abdomen appears  more diffusely spread rather than simply around the gallbladder  itself. Followup gallbladder ultrasound for further assessment if  clinically  warranted.    MD Samantha HAMMOND PA-C    This patient was seen and discussed with Dr. Gordon who agrees with the current plans as outlined above.

## 2017-07-26 NOTE — ED PROVIDER NOTES
History     Chief Complaint:  Abdominal Pain      HPI   Junior Patricia is a 67 year old male with a history of renal failure, multiple kidney stones, status post renal transplant, who presents to the emergency department today for evaluation of abdominal pain. The patient ate noodles, pork, and vegetables for dinner last night. He then began to have non bloody brown colored diarrhea shortly after eating dinner. He also developed fever at 0000 with diffuse abdominal pain prompting him to come to the ED. Here, patient complains mostly of severe diffuse abdominal pain. He has history of renal transplant in left lower quadrant but no other abdominal surgeries. Diarrhea is not watery. No vomiting. No dialysis. Of note, patient has not had any episode of kidney stone following renal transplant.     Allergies:  Simvastatin     Medications:    DILTIAZEM CD CPCR 120 MG/24HR OR  Metoprolol  Mycophenolate mofetil  Prednisone   Cyclosporine     Past Medical History:    CKD (chronic kidney disease) stage 3, GFR 30-59 ml/min   Colon polyps   DM type 2, goal A1C below 8.0   Elevated LFT's   GOUT NOS   Hyperlipidemia LDL goal <100   Kidney replaced by transplant   Macrocytosis without anemia   Nephrolithiasis   Obesity, unspecified    Past Surgical History:    TRANSPLANT KIDNEY RECIPIENT LIVING RELATED  Knee surgery     Family History:    History reviewed. No pertinent family history.     Social History:  The patient was accompanied to the ED by wife.  Smoking Status: Never smoker  Smokeless Tobacco: Never used  Alcohol Use: No  Marital Status:   [2]    Review of Systems   Constitutional: Positive for fever.   Gastrointestinal: Positive for abdominal pain and diarrhea. Negative for vomiting.   All other systems reviewed and are negative.    Physical Exam   Vitals:  Patient Vitals for the past 24 hrs:   BP Temp Temp src Pulse Heart Rate Resp SpO2   07/26/17 0545 134/89 - - - - - 97 %   07/26/17 0512 130/76 - - - - - 97 %    07/26/17 0416 121/82 98.3  F (36.8  C) Temporal 81 81 18 98 %          Physical Exam  Constitutional:  Appears well-developed. Very uncomfortable, grunting in pain. Alert. Despite pain, conversant  HENT:   Head: Atraumatic.   Nose: Nose normal.  Mouth/Throat: Oral mucosa is clear and moist. no trismus. Pharynx normal. Tonsils symmetric. No tonsillar enlargement, erythema, or exudate.  Eyes: Conjunctivae normal. EOM normal. Pupils equal, round, and reactive to light. No scleral icterus.   Neck: Normal range of motion. Neck supple. No tracheal deviation present.   Cardiovascular: Normal rate, regular rhythm. No gallop. No friction rub. No murmur heard. Symmetric radial artery pulses   Pulmonary/Chest: Effort normal. No stridor. No respiratory distress. No wheezes. No rales. No rhonchi . No tenderness.   Abdominal: Soft. Bowel sounds normal. Protuberant, but no tympany. No mass. Epigastric, RUQ, LUQ >> lower tenderness. No rebound. No guarding. No CVA tenderness.  Musculoskeletal: Normal range of motion. No edema. No tenderness. No deformity   Lymph: No cervical adenopathy.   Neurological: Alert and oriented to person, place, and time. Normal strength. CN II-VII intact. No sensory deficit. GCS eye subscore is 4. GCS verbal subscore is 5. GCS motor subscore is 6. Normal coordination   Skin: Skin is warm and dry. No rash noted. No pallor. Normal capillary refill.  Psychiatric:  Normal mood. Normal affect.     Emergency Department Course     ECG:  ECG taken at 0429, ECG read at 0431  Sinus rhythm  Normal ECG  Rate 76 bpm. PA interval 154. QRS duration 84. QT/QTc 384/432. P-R-T axes 7 13 57.    Imaging:  Radiology findings were communicated with the patient and family who voiced understanding of the findings.    US Abdomen limited:  Pending.      Laboratory:  Laboratory findings were communicated with the patient who voiced understanding of the findings.    CMP: Potassium: 5.4(H), Chloride: 110(H), Glucose: 155(H),  Creatinine: 1.46(H), GFR: 48(L), Calcium: 8.0(L), Albumin: 2.9(L), Protein total: 6.6(L), Alkphos: 222(H), ALT: 98(H), AST: 91(H)    Lipase: 153    Lactic acid whole blood: 2.4(H)    Interventions:  0500- Zofran 4 mg IV  0501- Dilaudid 0.5 mg IV  0512- NS 1000 mL IV     Emergency Department Course:  Nursing notes and vitals reviewed.  I performed an exam of the patient as documented above.       IV was inserted and blood was drawn for laboratory testing, results above.    0530: pain improved. Almost resolved about 10 minutes after first dose of dilaudid. Still minimal epigastric tenderness. Now sleeping but arousable and looks much more comfortable.     Pending return of imaging results, the patient's care was signed out to my partner, Dr. Donovan who will assign final disposition based on pending studies.    Impression & Plan      Medical Decision Making:  This a pleasant 67 year old gentleman with a history of renal failure and transplantation now on Prednisone, Cellcept, and Prograf who presents to the ED with abrupt onset of upper abdominal pain across her upper abdomen associated with multiple episodes of nonbloody, nonbilious diarrhea. Differential was broad initially, including bowel obstruction, perforation, diverticulitis, colitis, pancreatitis, gallbladder disease, peptic ulcer disease, early appendicitis, among others. Initial laboratory work up shows a normal white count and minimally low hemoglobin. Electrolytes are reassuring, save for potassium which is likely elevated to due hemolysis. LFTs are abnormal, which would suggest possible biliary colic. Fortunately, there is no evidence for gallstone pancreatitis. Lactic acid is mildly elevated at 2.4, which is nonspecific in etiology. Likely due to dehydration from diarrhea but could also possibly represent ischemic gut. We are currently awaiting the results of his gallbladder ultrasound. If that is diagnostic for cholecystitis or choledocholithiasis I  would treat accordingly. If it is nondiagnostic, I would proceed with CT imaging to evaluate for other GI tract pathology. Given his history of renal transplant and baseline low GFR, I would start with a CT without IV contrast. I have discussed this patient in detail with my partner Dr. Donovan who will follow up on the imaging results and disposition accordingly.    Diagnosis:  1. Abdominal pain  2. diarrhea      Scribe Disclosure:  I, Malika Wise, am serving as a scribe at 4:23 AM on 7/26/2017 to document services personally performed by Denny Perea MD, based on my observations and the provider's statements to me.    7/26/2017   [facility] EMERGENCY DEPARTMENT       Denny Perea MD  07/26/17 4136

## 2017-07-27 LAB
ALBUMIN SERPL-MCNC: 2.5 G/DL (ref 3.4–5)
ALP SERPL-CCNC: 120 U/L (ref 40–150)
ALT SERPL W P-5'-P-CCNC: 70 U/L (ref 0–70)
ANION GAP SERPL CALCULATED.3IONS-SCNC: 8 MMOL/L (ref 3–14)
AST SERPL W P-5'-P-CCNC: 46 U/L (ref 0–45)
BASOPHILS # BLD AUTO: 0 10E9/L (ref 0–0.2)
BASOPHILS NFR BLD AUTO: 0.2 %
BILIRUB SERPL-MCNC: 1.3 MG/DL (ref 0.2–1.3)
BUN SERPL-MCNC: 21 MG/DL (ref 7–30)
BURR CELLS BLD QL SMEAR: SLIGHT
C DIFF TOX B STL QL: NORMAL
CALCIUM SERPL-MCNC: 7.9 MG/DL (ref 8.5–10.1)
CAMPYLOBACTER GROUP BY NAT: NOT DETECTED
CHLORIDE SERPL-SCNC: 111 MMOL/L (ref 94–109)
CO2 SERPL-SCNC: 17 MMOL/L (ref 20–32)
CREAT SERPL-MCNC: 1.34 MG/DL (ref 0.66–1.25)
DIFFERENTIAL METHOD BLD: ABNORMAL
ENTERIC PATHOGEN COMMENT: ABNORMAL
EOSINOPHIL # BLD AUTO: 0 10E9/L (ref 0–0.7)
EOSINOPHIL NFR BLD AUTO: 0.7 %
ERYTHROCYTE [DISTWIDTH] IN BLOOD BY AUTOMATED COUNT: 13.9 % (ref 10–15)
GFR SERPL CREATININE-BSD FRML MDRD: 53 ML/MIN/1.7M2
GLUCOSE BLDC GLUCOMTR-MCNC: 104 MG/DL (ref 70–99)
GLUCOSE BLDC GLUCOMTR-MCNC: 115 MG/DL (ref 70–99)
GLUCOSE BLDC GLUCOMTR-MCNC: 128 MG/DL (ref 70–99)
GLUCOSE BLDC GLUCOMTR-MCNC: 129 MG/DL (ref 70–99)
GLUCOSE SERPL-MCNC: 134 MG/DL (ref 70–99)
HCT VFR BLD AUTO: 33.4 % (ref 40–53)
HGB BLD-MCNC: 10.9 G/DL (ref 13.3–17.7)
IMM GRANULOCYTES # BLD: 0 10E9/L (ref 0–0.4)
IMM GRANULOCYTES NFR BLD: 0.2 %
LYMPHOCYTES # BLD AUTO: 0.7 10E9/L (ref 0.8–5.3)
LYMPHOCYTES NFR BLD AUTO: 17.3 %
MACROCYTES BLD QL SMEAR: PRESENT
MCH RBC QN AUTO: 33.5 PG (ref 26.5–33)
MCHC RBC AUTO-ENTMCNC: 32.6 G/DL (ref 31.5–36.5)
MCV RBC AUTO: 103 FL (ref 78–100)
MONOCYTES # BLD AUTO: 0.6 10E9/L (ref 0–1.3)
MONOCYTES NFR BLD AUTO: 14.7 %
NEUTROPHILS # BLD AUTO: 2.8 10E9/L (ref 1.6–8.3)
NEUTROPHILS NFR BLD AUTO: 66.9 %
NOROVIRUS I AND II BY NAT: ABNORMAL
NRBC # BLD AUTO: 0 10*3/UL
NRBC BLD AUTO-RTO: 0 /100
PLATELET # BLD AUTO: 44 10E9/L (ref 150–450)
PLATELET # BLD EST: ABNORMAL 10*3/UL
POTASSIUM SERPL-SCNC: 3.8 MMOL/L (ref 3.4–5.3)
PROT SERPL-MCNC: 5.4 G/DL (ref 6.8–8.8)
RBC # BLD AUTO: 3.25 10E12/L (ref 4.4–5.9)
ROTAVIRUS A BY NAT: NOT DETECTED
SALMONELLA SPECIES BY NAT: NOT DETECTED
SHIGA TOXIN 1 GENE BY NAT: NOT DETECTED
SHIGA TOXIN 2 GENE BY NAT: NOT DETECTED
SHIGELLA SP+EIEC IPAH STL QL NAA+PROBE: NOT DETECTED
SODIUM SERPL-SCNC: 136 MMOL/L (ref 133–144)
SPECIMEN SOURCE: NORMAL
VIBRIO GROUP BY NAT: NOT DETECTED
WBC # BLD AUTO: 4.2 10E9/L (ref 4–11)
YERSINIA ENTEROCOLITICA BY NAT: NOT DETECTED

## 2017-07-27 PROCEDURE — 99233 SBSQ HOSP IP/OBS HIGH 50: CPT | Performed by: INTERNAL MEDICINE

## 2017-07-27 PROCEDURE — 87040 BLOOD CULTURE FOR BACTERIA: CPT | Performed by: INTERNAL MEDICINE

## 2017-07-27 PROCEDURE — 87493 C DIFF AMPLIFIED PROBE: CPT | Performed by: PHYSICIAN ASSISTANT

## 2017-07-27 PROCEDURE — 25000125 ZZHC RX 250: Performed by: HOSPITALIST

## 2017-07-27 PROCEDURE — 36415 COLL VENOUS BLD VENIPUNCTURE: CPT | Performed by: INTERNAL MEDICINE

## 2017-07-27 PROCEDURE — 25000132 ZZH RX MED GY IP 250 OP 250 PS 637: Mod: GY | Performed by: HOSPITALIST

## 2017-07-27 PROCEDURE — 80053 COMPREHEN METABOLIC PANEL: CPT | Performed by: PHYSICIAN ASSISTANT

## 2017-07-27 PROCEDURE — 00000146 ZZHCL STATISTIC GLUCOSE BY METER IP

## 2017-07-27 PROCEDURE — 25000131 ZZH RX MED GY IP 250 OP 636 PS 637: Mod: GY | Performed by: HOSPITALIST

## 2017-07-27 PROCEDURE — A9270 NON-COVERED ITEM OR SERVICE: HCPCS | Mod: GY | Performed by: HOSPITALIST

## 2017-07-27 PROCEDURE — 25000128 H RX IP 250 OP 636: Performed by: INTERNAL MEDICINE

## 2017-07-27 PROCEDURE — S5010 5% DEXTROSE AND 0.45% SALINE: HCPCS | Performed by: HOSPITALIST

## 2017-07-27 PROCEDURE — 25800025 ZZH RX 258: Performed by: HOSPITALIST

## 2017-07-27 PROCEDURE — 36415 COLL VENOUS BLD VENIPUNCTURE: CPT | Performed by: PHYSICIAN ASSISTANT

## 2017-07-27 PROCEDURE — 12000007 ZZH R&B INTERMEDIATE

## 2017-07-27 PROCEDURE — 85025 COMPLETE CBC W/AUTO DIFF WBC: CPT | Performed by: PHYSICIAN ASSISTANT

## 2017-07-27 PROCEDURE — 87506 IADNA-DNA/RNA PROBE TQ 6-11: CPT | Performed by: PHYSICIAN ASSISTANT

## 2017-07-27 RX ORDER — NICOTINE POLACRILEX 4 MG
15-30 LOZENGE BUCCAL
Status: DISCONTINUED | OUTPATIENT
Start: 2017-07-27 | End: 2017-07-28 | Stop reason: HOSPADM

## 2017-07-27 RX ORDER — SODIUM CHLORIDE 9 MG/ML
INJECTION, SOLUTION INTRAVENOUS CONTINUOUS
Status: DISCONTINUED | OUTPATIENT
Start: 2017-07-27 | End: 2017-07-28 | Stop reason: HOSPADM

## 2017-07-27 RX ORDER — DEXTROSE MONOHYDRATE 25 G/50ML
25-50 INJECTION, SOLUTION INTRAVENOUS
Status: DISCONTINUED | OUTPATIENT
Start: 2017-07-27 | End: 2017-07-28 | Stop reason: HOSPADM

## 2017-07-27 RX ADMIN — CYCLOSPORINE 50 MG: 25 CAPSULE ORAL at 09:44

## 2017-07-27 RX ADMIN — METOPROLOL TARTRATE 12.5 MG: 25 TABLET, FILM COATED ORAL at 21:46

## 2017-07-27 RX ADMIN — SODIUM CHLORIDE: 9 INJECTION, SOLUTION INTRAVENOUS at 10:19

## 2017-07-27 RX ADMIN — Medication 1 CAPSULE: at 21:47

## 2017-07-27 RX ADMIN — Medication 1 CAPSULE: at 09:44

## 2017-07-27 RX ADMIN — TAZOBACTAM SODIUM AND PIPERACILLIN SODIUM 3.38 G: 375; 3 INJECTION, SOLUTION INTRAVENOUS at 19:52

## 2017-07-27 RX ADMIN — METOPROLOL TARTRATE 12.5 MG: 25 TABLET, FILM COATED ORAL at 09:44

## 2017-07-27 RX ADMIN — DEXTROSE AND SODIUM CHLORIDE: 5; 450 INJECTION, SOLUTION INTRAVENOUS at 00:11

## 2017-07-27 RX ADMIN — PREDNISONE 5 MG: 5 TABLET ORAL at 09:44

## 2017-07-27 RX ADMIN — MYCOPHENOLATE MOFETIL 1000 MG: 250 CAPSULE ORAL at 21:47

## 2017-07-27 RX ADMIN — TAZOBACTAM SODIUM AND PIPERACILLIN SODIUM 3.38 G: 375; 3 INJECTION, SOLUTION INTRAVENOUS at 13:57

## 2017-07-27 RX ADMIN — MYCOPHENOLATE MOFETIL 1000 MG: 250 CAPSULE ORAL at 09:44

## 2017-07-27 RX ADMIN — DILTIAZEM HYDROCHLORIDE 120 MG: 120 CAPSULE, EXTENDED RELEASE ORAL at 09:44

## 2017-07-27 RX ADMIN — TAZOBACTAM SODIUM AND PIPERACILLIN SODIUM 3.38 G: 375; 3 INJECTION, SOLUTION INTRAVENOUS at 09:43

## 2017-07-27 RX ADMIN — CYCLOSPORINE 25 MG: 25 CAPSULE ORAL at 21:47

## 2017-07-27 NOTE — PROGRESS NOTES
[facility] called with + norovirus result. Result given to DIMITRY Wright and paged out to Dr Gonzalez.

## 2017-07-27 NOTE — PROVIDER NOTIFICATION
"Paged Dr. Gonzalez at [phone] \"FYI prelim blood culture results is + for staphylococcus epidermidis. Thanks!\"  "

## 2017-07-27 NOTE — PROGRESS NOTES
Infection Prevention:    Patient placed on Enteric precautions because of loose stools with possible infectious etiology. Please contact Infection Prevention with any questions/concerns at [phone].    Alejandra Wang, ICP

## 2017-07-27 NOTE — CONSULTS
IMP:  1. 66 yo male with DM  2. Renal transplant 2004.  3. Adm with one day of fever to 103 and diarrhea.  abd CT shows colitis.  R/o C diff.  Fever and diarrhea resolved quickly  4. One blood cx bottle with Staph epi.  Suspect contaminant.    REC:  1. Cont Vanco and Zosyn for now  2. Await C diff study  3. Will f/u on cxs.

## 2017-07-27 NOTE — PLAN OF CARE
Problem: Goal Outcome Summary  Goal: Goal Outcome Summary  Outcome: Improving  Pt A&Ox4, up independently in room. Stool sample sent this shift. IV with NS at 50mL/hr. On tele, SR. Pt frustrated to be staying in the hospital and wants to go home, educated on need for IV antibiotics which he and wife expressed understanding. Again offered for pt to have  as he is hmong speaking, he denies need for this stating he understands just fine, informed he could always request  if feeling he needs this.

## 2017-07-27 NOTE — PHARMACY-VANCOMYCIN DOSING SERVICE
Pharmacy Vancomycin Initial Note  Date of Service 2017  Patient's  1950  67 year old, male    Indication: Bacteremia    Current estimated CrCl = Estimated Creatinine Clearance: 49.9 mL/min (based on Cr of 1.34).    Creatinine for last 3 days  2017:  4:55 AM Creatinine 1.46 mg/dL;  9:15 AM Creatinine 1.49 mg/dL  2017:  5:37 AM Creatinine 1.34 mg/dL    Recent Vancomycin Level(s) for last 3 days  No results found for requested labs within last 72 hours.      Vancomycin IV Administrations (past 72 hours)      No vancomycin orders with administrations in past 72 hours.                Nephrotoxins and other renal medications (Future)    Start     Dose/Rate Route Frequency Ordered Stop    17 0845  vancomycin (VANCOCIN) 1,750 mg in NaCl 0.9 % 500 mL intermittent infusion      1,750 mg Intravenous EVERY 24 HOURS 17 0840      17 0815  piperacillin-tazobactam (ZOSYN) infusion 3.375 g     Comments:  Pharmacy can adjust dose based on renal function.    3.375 g  100 mL/hr over 30 Minutes Intravenous EVERY 6 HOURS 17 0808      17 2100  cycloSPORINE modified (GENGRAF BRAND) capsule 25 mg      25 mg Oral EVERY EVENING 17 1241      17 1245  cycloSPORINE modified (GENGRAF BRAND) capsule 50 mg      50 mg Oral EVERY MORNING 17 1237            Contrast Orders - past 72 hours (72h ago through future)    Start     Dose/Rate Route Frequency Ordered Stop    17 0432  iohexol (OMNIPAQUE) solution 25 mL  Status:  Discontinued      25 mL Oral EVERY 30 MIN 17 0431 17 1132                Plan:  1.  Start vancomycin  1500 mg IV q24h.   2.  Goal Trough Level: 15-20 mg/L   3.  Pharmacy will check trough levels as appropriate in 1-3 Days.    4. Serum creatinine levels will be ordered daily for the first week of therapy and at least twice weekly for subsequent weeks.    5. [place] method utilized to dose vancomycin therapy: Method 2    No Manley

## 2017-07-27 NOTE — PROGRESS NOTES
[facility]  Hospitalist Progress Note  Toni Gonzalez,  07/27/2017    Reason for Stay (Diagnosis): Colitis.         Assessment and Plan:      Summary of Stay: Junior Patricia is a 67 year old male admitted on 7/26/2017 with Colitis    Problem List:   1. Colitis.  C diff and enteric panel sent and results pending.  Symptoms improved.  Advance diet.  Decrease IV fluids.  2. Bacteremia.  1 blood culture with Staph epidermidis.  Possible contaminant.  Recheck blood cultures.  Start IV Vanco and Zosyn for now.  ID consult.  3. Kidney transplant.  Continue Prednisone, Cellcept, Gengraf.  4. HTN.  Continue Metoprolol, Diltiazem.  5. Diabetes Mellitus.  Start Novolog SSI.  6. Hepatitis B.  Follow up with GI as outpatient.  7. Thrombocytopenia.  Chronic.  Likely due to chronic disease (Liver).  Avoid heparin products if possible.  DVT Prophylaxis: Pneumatic Compression Devices  Code Status: Full Code  Discharge Dispo: Home  Estimated Disch Date / # of Days until Disch: 2-4        Interval History (Subjective):      Abdominal pain much improved.  Still having some diarrhea, but less than previous.  No N/V today.  No CP, SOB, F/C.                  Physical Exam:      Last Vital Signs:  /78  Pulse 81  Temp 97.4  F (36.3  C) (Oral)  Resp 16  Ht 1.524 m (5')  Wt 87.4 kg (192 lb 11.2 oz)  SpO2 98%  BMI 37.63 kg/m2    Gen:  NAD, A&Ox3.  Eyes:  PERRL, sclera anicteric.  OP:  MMM, no lesions.  Neck:  Supple.  CV:  Regular, no murmurs.  Lung:  CTA b/l, normal effort.  Ab:  +BS, soft.  Skin:  Warm, dry to touch.  No rash.  Ext:  No pitting edema LE b/l.           Medications:      All current medications were reviewed with changes reflected in problem list.         Data:      All new lab and imaging data was reviewed.   Labs:    Recent Labs  Lab 07/27/17  0537      POTASSIUM 3.8   CHLORIDE 111*   CO2 17*   ANIONGAP 8   *   BUN 21   CR 1.34*   GFRESTIMATED 53*   GFRESTBLACK 64   AMARILYS 7.9*        Recent Labs  Lab 07/27/17  0537   WBC 4.2   HGB 10.9*   HCT 33.4*   *   PLT 44*      Imaging:   No results found for this or any previous visit (from the past 24 hour(s)).

## 2017-07-27 NOTE — PLAN OF CARE
Problem: Goal Outcome Summary  Goal: Goal Outcome Summary  Outcome: No Change  [id number]: Pt resting comfortably. AO VSS-Tmax 99.4. Had mild abd pain, declining pain interventions. Denies n/v. Transfers SBA/Ind. Having multiple loose stools. Hyper BS. Tolerating clear liq diet. Has IVF infusing. Tele reads SR. Will continue to monitor.

## 2017-07-28 VITALS
WEIGHT: 189.7 LBS | RESPIRATION RATE: 18 BRPM | HEART RATE: 81 BPM | OXYGEN SATURATION: 98 % | HEIGHT: 60 IN | DIASTOLIC BLOOD PRESSURE: 76 MMHG | BODY MASS INDEX: 37.24 KG/M2 | TEMPERATURE: 97.3 F | SYSTOLIC BLOOD PRESSURE: 129 MMHG

## 2017-07-28 LAB
CMV DNA SPEC NAA+PROBE-ACNC: NORMAL [IU]/ML
CMV DNA SPEC NAA+PROBE-LOG#: NORMAL {LOG_IU}/ML
CREAT SERPL-MCNC: 1.38 MG/DL (ref 0.66–1.25)
GFR SERPL CREATININE-BSD FRML MDRD: 52 ML/MIN/1.7M2
GLUCOSE BLDC GLUCOMTR-MCNC: 127 MG/DL (ref 70–99)
GLUCOSE BLDC GLUCOMTR-MCNC: 82 MG/DL (ref 70–99)
GLUCOSE BLDC GLUCOMTR-MCNC: 83 MG/DL (ref 70–99)
HBA1C MFR BLD: 5.9 % (ref 4.3–6)
SPECIMEN SOURCE: NORMAL

## 2017-07-28 PROCEDURE — 25000131 ZZH RX MED GY IP 250 OP 636 PS 637: Mod: GY | Performed by: HOSPITALIST

## 2017-07-28 PROCEDURE — 36415 COLL VENOUS BLD VENIPUNCTURE: CPT | Performed by: HOSPITALIST

## 2017-07-28 PROCEDURE — 82565 ASSAY OF CREATININE: CPT | Performed by: HOSPITALIST

## 2017-07-28 PROCEDURE — 25000132 ZZH RX MED GY IP 250 OP 250 PS 637: Mod: GY | Performed by: HOSPITALIST

## 2017-07-28 PROCEDURE — 25000128 H RX IP 250 OP 636: Performed by: INTERNAL MEDICINE

## 2017-07-28 PROCEDURE — 25000125 ZZHC RX 250: Performed by: HOSPITALIST

## 2017-07-28 PROCEDURE — 00000146 ZZHCL STATISTIC GLUCOSE BY METER IP

## 2017-07-28 PROCEDURE — 99239 HOSP IP/OBS DSCHRG MGMT >30: CPT | Performed by: INTERNAL MEDICINE

## 2017-07-28 PROCEDURE — A9270 NON-COVERED ITEM OR SERVICE: HCPCS | Mod: GY | Performed by: HOSPITALIST

## 2017-07-28 PROCEDURE — 83036 HEMOGLOBIN GLYCOSYLATED A1C: CPT | Performed by: HOSPITALIST

## 2017-07-28 PROCEDURE — 25000128 H RX IP 250 OP 636

## 2017-07-28 RX ADMIN — TAZOBACTAM SODIUM AND PIPERACILLIN SODIUM 3.38 G: 375; 3 INJECTION, SOLUTION INTRAVENOUS at 02:06

## 2017-07-28 RX ADMIN — PREDNISONE 5 MG: 5 TABLET ORAL at 08:16

## 2017-07-28 RX ADMIN — MYCOPHENOLATE MOFETIL 1000 MG: 250 CAPSULE ORAL at 08:16

## 2017-07-28 RX ADMIN — DILTIAZEM HYDROCHLORIDE 120 MG: 120 CAPSULE, EXTENDED RELEASE ORAL at 08:16

## 2017-07-28 RX ADMIN — Medication 1 CAPSULE: at 08:16

## 2017-07-28 RX ADMIN — CYCLOSPORINE 50 MG: 25 CAPSULE ORAL at 08:16

## 2017-07-28 RX ADMIN — TAZOBACTAM SODIUM AND PIPERACILLIN SODIUM 3.38 G: 375; 3 INJECTION, SOLUTION INTRAVENOUS at 08:20

## 2017-07-28 RX ADMIN — METOPROLOL TARTRATE 12.5 MG: 25 TABLET, FILM COATED ORAL at 08:16

## 2017-07-28 RX ADMIN — VANCOMYCIN HYDROCHLORIDE 1500 MG: 10 INJECTION, POWDER, LYOPHILIZED, FOR SOLUTION INTRAVENOUS at 09:22

## 2017-07-28 RX ADMIN — SODIUM CHLORIDE: 9 INJECTION, SOLUTION INTRAVENOUS at 08:21

## 2017-07-28 NOTE — DISCHARGE SUMMARY
Discharge Summary  Hospitalist Service    Junior Patricia MRN# [id number]   YOB: 1950 Age: 67 year old     Date of Admission:  7/26/2017  Date of Discharge:  7/28/2017  Admitting Physician:  Jey Gordon DO  Discharge Physician: Toni Gonzalez DO  Discharging Service: Hospitalist Service     [facility]  Primary Care Physician Phone Number: None         Discharge Diagnoses/Problem Oriented Hospital Course (Providers):    Junior Patricia was admitted on 7/26/2017 by Jey Gordon DO and I would refer you to their history and physical.  The following problems were addressed during his hospitalization:    1. Colitis. Secondary to Norovirus infection.  Symptoms improving.  2. Bacteremia.  1 blood culture with Staph epidermidis.  Likely contaminant.  ID consulted during staay.  Was initially on IV Vanco and Zosyn after blood culture came back positive.  Now antibiotics are stopped.  3. Kidney transplant.  Continue Prednisone, Cellcept, Gengraf.  4. HTN.  Continue Metoprolol, Diltiazem.  5. Diabetes Mellitus.  HgbA1c only 5.9.  Follow up with PCP as outpatient.  6. Hepatitis B.  Follow up with GI as outpatient.  7. Thrombocytopenia.  Chronic.  Likely due to chronic disease (Liver).  Avoid heparin products if possible.         Code Status:      Full Code          Pending Results:        Unresulted Labs Ordered in the Past 30 Days of this Admission     Date and Time Order Name Status Description    7/27/2017 1447 CMV DNA quantification In process     7/27/2017 1217 Blood culture Preliminary     7/27/2017 1217 Blood culture Preliminary     7/26/2017 1211 Mycophenolic acid In process     7/26/2017 0839 Blood culture Preliminary     7/26/2017 0839 Blood culture Preliminary             Discharge Instructions and Follow-Up:      Follow-up Appointments     Follow-up and recommended labs and tests        [facility]  within   7 days for hospital follow- up.  The following labs/tests are recommended:   BMP in 7 days.                      Discharge Disposition:      Discharged to home         Discharge Medications:        Current Discharge Medication List      CONTINUE these medications which have NOT CHANGED    Details   predniSONE (DELTASONE) 5 MG tablet Take 5 mg by mouth daily      aspirin 81 MG EC tablet Take 81 mg by mouth daily      diltiazem (DILTIAZEM CD) 120 MG 24 hr capsule Take 120 mg by mouth daily      !! cycloSPORINE modified (GENERIC EQUIVALENT) 25 MG capsule Take 50 mg by mouth every morning      !! cycloSPORINE modified (GENERIC EQUIVALENT) 25 MG capsule Take 25 mg by mouth every evening      metoprolol (LOPRESSOR) 25 MG tablet Take 12.5 mg by mouth 2 times daily       mycophenolate (CELLCEPT) 500 MG tablet Take 1,000 mg by mouth 2 times daily       albuterol (PROAIR HFA/PROVENTIL HFA/VENTOLIN HFA) 108 (90 BASE) MCG/ACT Inhaler Inhale 2 puffs into the lungs every 6 hours as needed for shortness of breath / dyspnea or wheezing  Qty: 1 Inhaler, Refills: 0    Associated Diagnoses: Asthma exacerbation      ACE/ARB NOT PRESCRIBED, INTENTIONAL, by Other route continuous prn.    Associated Diagnoses: Kidney replaced by transplant; CKD (chronic kidney disease) stage 3, GFR 30-59 ml/min       !! - Potential duplicate medications found. Please discuss with provider.            Allergies:         Allergies   Allergen Reactions     No Known Drug Allergies      Simvastatin Itching           Condition and Physical on Discharge:      Discharge condition: Stable   Vitals: Blood pressure 129/76, pulse 81, temperature 97.3  F (36.3  C), temperature source Oral, resp. rate 18, height 1.524 m (5'), weight 86 kg (189 lb 11.2 oz), SpO2 98 %.     Gen:  NAD, A&Ox3.  Eyes:  PERRL, sclera anicteric.  OP:  MMM, no lesions.  Neck:  Supple.  CV:  Regular, no murmurs.  Lung:  CTA b/l, normal effort.  Ab:  +BS, soft.  Skin:  Warm, dry to  touch.  No rash.  Ext:  No pitting edema LE b/l.        Discharge Time:      35 minutes spent with patient and working on discharge on 7/28/17.        Image Results From This Hospital Stay (For Non-EPIC Providers):        Results for orders placed or performed during the hospital encounter of 07/26/17   US Abdomen Limited    Narrative    US ABDOMEN LIMITED  7/26/2017 6:15 AM      HISTORY: Right upper quadrant pain.     COMPARISON: None.    FINDINGS: The liver has a nodular contour consistent with cirrhosis.  No focal mass. There is no intra or extrahepatic biliary dilatation.  The common hepatic duct measures 0.3 cm. There are multiple stones in  the gallbladder. The gallbladder otherwise appears normal. The  pancreas is completely obscured by bowel gas. The right kidney has  been removed. The proximal abdominal aorta and IVC appear normal.      Impression    IMPRESSION:  1. Cholelithiasis. There is no biliary dilatation or evidence of  cholecystitis.  2. Cirrhotic liver.    HAZEL LYNNE MD   CT Abdomen Pelvis w/o Contrast    Narrative    CT ABDOMEN AND PELVIS WITHOUT CONTRAST  7/26/2017 7:53 AM     HISTORY: Immunocompromised (renal transplant), now with fever and  abdominal pain. Evaluate for cholecystitis, diverticulitis, other  intraabdominal infection.    COMPARISON: CT abdomen and pelvis 2/22/2012.    TECHNIQUE: Axial images are obtained from the lung bases to the  symphysis without oral or IV contrast.  Coronal reformatted images are  also generated.  Radiation dose for this scan was reduced using  automated exposure control, adjustment of the mA and/or kV according  to patient size, or iterative reconstruction technique.    FINDINGS: The lung bases are clear. Coronary artery calcification is  noted.    Abdomen: There is slight nodularity to the liver contour suggesting  chronic liver disease. New perihepatic and perisplenic ascites is  present. Gallbladder is mildly distended. No obvious  calcified  gallstone. Mesenteric inflammation and stranding is present throughout  the mid and upper abdomen. Native kidneys are atrophic without  hydronephrosis. Large stone is noted in the proximal left native  ureter on series 2, image 42 which is stable. Fluid tracks down the  paracolic gutters bilaterally. Fat-containing umbilical hernia is  present. There is questionable wall thickening involving the colon  from the cecum through the mid transverse colon, possibly indicating  an infectious or inflammatory colitis. Correlate clinically. No  enlarged abdominal lymph nodes are appreciated. Scattered small  mesenteric nodes are likely reactive. Aorta is calcified without  evidence of aneurysm.    Pelvis: The bladder and rectum are unremarkable. Prostate gland is  normal in size. Transplant kidney is present in the left iliac fossa  without hydronephrosis or renal calculi. No enlarged pelvic lymph  nodes or significant free pelvic fluid. Bone window examination  demonstrates degenerative spine changes.      Impression    IMPRESSION:  1. Mesenteric inflammation and mild ascites not evident on prior exam  along with possible colonic wall thickening from the cecum through the  transverse colon concerning for an infectious or inflammatory colitis.  Correlate with patient's clinical picture. Typhlitis remains in the  differential in this immunocompromised patient.  2. New cirrhotic liver morphology. Ascites may be related to chronic  liver dysfunction.  3. Transplant kidney left iliac fossa appears normal. No  peritransplant fluid collection. No hydronephrosis. Native kidneys are  atrophic and unchanged.  4. Gallbladder is mildly prominent but unchanged compared to prior  exam. No calcified gallstones. The inflammation in the abdomen appears  more diffusely spread rather than simply around the gallbladder  itself. Followup gallbladder ultrasound for further assessment if  clinically warranted.    CRISTOFER KEY MD            Most Recent Lab Results In EPIC (For Non-EPIC Providers):    Most Recent 3 CBC's:  Recent Labs   Lab Test  07/27/17   0537  07/26/17   0541  05/09/17   0755   WBC  4.2  5.0  7.2   HGB  10.9*  13.0*  12.9*   MCV  103*  109*  98   PLT  44*  51*  75*      Most Recent 3 BMP's:  Recent Labs   Lab Test  07/28/17   0522  07/27/17   0537  07/26/17   0915  07/26/17   0455   NA   --   136  138  136   POTASSIUM   --   3.8  4.4  5.4*   CHLORIDE   --   111*  113*  110*   CO2   --   17*  18*  22   BUN   --   21  29  26   CR  1.38*  1.34*  1.49*  1.46*   ANIONGAP   --   8  7  4   AMARILYS   --   7.9*  7.4*  8.0*   GLC   --   134*  143*  155*     Most Recent 3 Troponin's:No lab results found.  Most Recent 3 INR's:  Recent Labs   Lab Test  02/22/12   1607   INR  1.06     Most Recent 2 LFT's:  Recent Labs   Lab Test  07/27/17   0537  07/26/17   0915   AST  46*  62*   ALT  70  79*   ALKPHOS  120  157*   BILITOTAL  1.3  1.8*     Most Recent Cholesterol Panel:  Recent Labs   Lab Test  04/05/17   1132   05/10/11   0935   CHOL   --    --   186   LDL  66   < >  119   HDL   --    --   40   TRIG   --    --   137    < > = values in this interval not displayed.     Most Recent 6 Bacteria Isolates From Any Culture (See EPIC Reports for Culture Details):  Recent Labs   Lab Test  07/27/17   1343  07/27/17   1335  07/26/17   0920  07/26/17   0915  02/22/12   1635  02/22/12   1607   CULT  No growth after 15 hours  No growth after 15 hours  No growth after 2 days  Cultured on the 1st day of incubation: Staphylococcus epidermidis Susceptibility   testing in progress  Critical Value/Significant Value, preliminary result only, called to and read   back by Laxmi Feliz RN at [facility] MS3 at 8:05am 7/27/2017 ()  (Note)  POSITIVE for STAPHYLOCOCCUS EPIDERMIDIS and NEGATIVE for the mecA  gene (not resistant to methicillin) by Verigene nucleic acid test.  The mecA gene was not detected. Final identification and  antimicrobial susceptibility testing  will be verified by standard  methods.    Specimen tested with Alacritechigene multiplex, gram-positive blood culture  nucleic acid test for the following targets: Staph aureus, Staph  epidermidis, Staph lugdunensis, other Staph species, Enterococcus  faecalis, Enterococcus faecium, Streptococcus species, S. agalactiae,  S. anginosus grp., S. pneumoniae, S. pyogenes, Listeria sp., mecA  (methicillin resistance) and Nelsy/B (vancomycin resistance).    Critical Value/Significant Value called to and read back  by Laxmi Feliz RN MS3. 7/27/17 @1059. [facility]    *  No growth after 6 days  No growth after 6 days     Most Recent TSH, T4 and HgbA1c:   Recent Labs   Lab Test  07/28/17   0522  04/05/17   1132   TSH   --   2.29   A1C  5.9  7.0*

## 2017-07-28 NOTE — PROGRESS NOTES
Infection Prevention:    Patient requires Enteric precautions because of Norovirus. Please contact Infection Prevention with any questions/concerns at [phone].    Alejandra Wang, ICP

## 2017-07-28 NOTE — PROGRESS NOTES
13:10 Pt d/c home at this time accompanied by family. Pt verbalizes understanding of d/c instructions and medications.  Pt acknowledges receipt of all belongings.

## 2017-07-28 NOTE — PROGRESS NOTES
[facility]  Infectious Disease Progress Note          Assessment and Plan:   IMP:  1. 66 yo male with DM  2. Renal transplant 2004.  3. Adm with one day of fever to 103 and diarrhea.  abd CT shows colitis.  c diff neg.  Now found to have Norovirus  4. One blood cx bottle with Staph epi.  Suspect contaminant.     REC:  1. Will d/c abs  2. We will sign off.  Thanks.        Interval History:   Improved.  Afebrile.  norovirus as above.              Medications:       insulin aspart  1-7 Units Subcutaneous TID AC     insulin aspart  1-5 Units Subcutaneous At Bedtime     sodium chloride (PF)  3 mL Intracatheter Q8H     diltiazem  120 mg Oral Daily     metoprolol  12.5 mg Oral BID     predniSONE  5 mg Oral Daily     lactobacillus rhamnosus (GG)  1 capsule Oral BID     cycloSPORINE modified  50 mg Oral QAM     cycloSPORINE modified  25 mg Oral QPM     mycophenolate  1,000 mg Oral BID     sodium chloride (PF)  3 mL Intracatheter Q8H                  Physical Exam:   Blood pressure 129/76, pulse 81, temperature 97.3  F (36.3  C), temperature source Oral, resp. rate 18, height 1.524 m (5'), weight 86 kg (189 lb 11.2 oz), SpO2 98 %.  [unfilled]  Vital Signs with Ranges  Temp:  [97.3  F (36.3  C)-98.7  F (37.1  C)] 97.3  F (36.3  C)  Heart Rate:  [62-71] 64  Resp:  [16-18] 18  BP: (123-129)/(73-80) 129/76  SpO2:  [97 %-98 %] 98 %    Constitutional: Awake, alert, cooperative, no apparent distress   Lungs: Clear to auscultation bilaterally, no crackles or wheezing   Cardiovascular: Regular rate and rhythm, normal S1 and S2, and no murmur noted   Abdomen: Normal bowel sounds, soft, non-distended, non-tender   Skin: No rashes, no cyanosis, no edema   Other:           Data:   All microbiology laboratory data reviewed.  Recent Labs   Lab Test  07/27/17   0537  07/26/17   0541  05/09/17   0755   WBC  4.2  5.0  7.2   HGB  10.9*  13.0*  12.9*   HCT  33.4*  42.0  38.0*   MCV  103*  109*  98   PLT  44*  51*  75*      Recent Labs   Lab Test  07/28/17   0522  07/27/17   0537  07/26/17   0915   CR  1.38*  1.34*  1.49*     No lab results found.

## 2017-07-28 NOTE — CONSULTS
Infectious disease consultation:      DATE OF CONSULTATION:  07/27/2017    REASON FOR CONSULTATION:  Asked by Dr. Toni Gonzalez to assess bacteremia.    IMPRESSION:    1.  Yisel Mccoy is [facility] 67[facility] with end-stage renal disease status post renal transplantation in 2004.  On immunosuppressive medications.   2.  Admitted with fever and abdominal pain.  CT scan shows colonic wall thickening consistent with generalized colitis of infectious or inflammatory origin.  Various diagnostic possibilities for the colitis.  Should rule out Clostridium difficile  3.  Typhlitis a possibility as is viral gastroenteritis.  The diarrhea is now resolved.  4.  One blood culture bottle with Staphylococcus epidermidis.  This likely represents contaminant as patient has no chronic indwelling vascular lines.    Plan:    1.  Continue vancomycin and Zosyn pending final on cultures.    2.  Await C difficile study 3.  3.  Follow fever curve and clinical progress.    History of present illness:  This is a 67-year-old male, native of [place] [facility] [place], who has resided in the [place] for nearly [place] years.  He has a medical history significant for end-stage renal disease, for which he had renal transplantation in 2004.  He has underlying diabetes mellitus and also has a history of hepatitis B.  He was in his usual state of good health until a day prior to admission when he developed fever to 103 degrees and epigastric abdominal pain with diarrhea.  He came to the hospital where a CT scan was done and showed the above-mentioned colitis.  After cultures were obtained, the patient was started on vancomycin and Zosyn.  One blood culture bottle has returned showing Staph epidermidis.  The patient has no dialysis catheter, port or other indwelling vascular device.  Today he is afebrile and says that his abdominal pain and diarrhea have resolved.  He is asking to go home.  He does denies any unusual exposure recently.    PAST MEDICAL HISTORY:     1.  Diabetes mellitus.    2.  End-stage renal disease.    3.  Renal transplantation   4.  Hepatitis B.  5.  Hypertension.   6.  Hyperlipidemia.    ALLERGIES:  None known.    SOCIAL HISTORY:  Born in [place].  Emigrated to the [place] in [place].    FAMILY HISTORY:  Noncontributory.    REVIEW OF SYSTEMS:  Negative other than that described above.    PHYSICAL examination:    Vital signs:  Temp 97.8, blood pressure 131/78, heart rate 61 and regular.    General:  Well developed, well nourished, alert and oriented.  Obese. Does not look acutely ill.   Skin:  No rashes or nodules.  HEENT:  Eyes:  No subconjunctival hemorrhages or scleral icterus.  Oropharynx without erythema or exudate.    Neck:  Supple.  No thyromegaly.    Lymphatics:  No cervical or axillary lymphadenopathy.    Lungs:  Clear to auscultation.  No use of accessory muscles of respiration.   Cardiac:  Cor S1, S2 normal.  No S3, S4 or murmur.    Abdomen:  Soft, nontender.  No mass or hepatosplenomegaly.  Extremities:  No calf tenderness or pedal edema.    For further details of patient's history, please refer to the chart.  Thank you very much for this consultation.          Chirag Foss MD    D:  07/27/2017 16:03 T:  07/27/2017 16:28  Document:  [id number]\SV    cc: Dr. Toni Gonzalez DO

## 2017-07-28 NOTE — PLAN OF CARE
Problem: Goal Outcome Summary  Goal: Goal Outcome Summary  Outcome: No Change  [id number]: Pt resting comfortably. VSS AO. Pt denies any pain. Having fewer loose stools. Blood sugar 127. On Zosyn. Transfers independently. IVF infusing at 50ml/hr. Declines using an . Will continue to monitor.

## 2017-07-29 LAB
BACTERIA SPEC CULT: ABNORMAL
Lab: ABNORMAL
MICRO REPORT STATUS: ABNORMAL
MICROORGANISM SPEC CULT: ABNORMAL
MYCOPHENOLATE SERPL LC/MS/MS-MCNC: 0.43 MG/L (ref 1–3.5)
MYCOPHENOLATE-G SERPL LC/MS/MS-MCNC: 52.9 MG/L (ref 30–95)
SPECIMEN SOURCE: ABNORMAL
TME LAST DOSE: ABNORMAL H

## 2017-07-31 ENCOUNTER — TELEPHONE (OUTPATIENT)
Dept: FAMILY MEDICINE | Facility: CLINIC | Age: 67
End: 2017-07-31

## 2017-07-31 NOTE — TELEPHONE ENCOUNTER
Chief Complaint   Patient presents with     Hospital F/U     Inpatient discharge from [facility] on 7/28/2017 Colitis       Laura Strange/

## 2017-08-01 LAB
BACTERIA SPEC CULT: NO GROWTH
Lab: NORMAL
MICRO REPORT STATUS: NORMAL
SPECIMEN SOURCE: NORMAL

## 2017-08-01 NOTE — TELEPHONE ENCOUNTER
"Hospital/TCU/ED for chronic condition Discharge Protocol    \"Hi, my name is Abigail Lopez, a registered nurse, and I am calling from [facility].  I am calling to follow up and see how things are going for you after your recent emergency visit/hospital/TCU stay.\"    Tell me how you are doing now that you are home?\" feeling ok now      Discharge Instructions    \"Let's review your discharge instructions.  What is/are the follow-up recommendations?  Pt. Response: \"nothing, I'm all better\"    \"Has an appointment with your primary care provider been scheduled?\"   No (schedule appointment)    \"When you see the provider, I would recommend that you bring your medications with you.\"    Medications    \"Tell me what changed about your medicines when you discharged?\"    Changes to chronic meds?    0-1    \"What questions do you have about your medications?\"    None     New diagnoses of heart failure, COPD, diabetes, or MI?    No    Medication reconciliation completed? Yes  Was MTM referral placed (*Make sure to put transitions as reason for referral)?   No    Call Summary    \"What questions or concerns do you have about your recent visit and your follow-up care?\"     none    \"If you have questions or things don't continue to improve, we encourage you contact us through the main clinic number (give number).  Even if the clinic is not open, triage nurses are available 24/7 to help you.     We would like you to know that our clinic has extended hours (provide information).  We also have urgent care (provide details on closest location and hours/contact info)\"      \"Thank you for your time and take care!\"       Abigail Lopez RN, BS  Clinical Nurse Triage.    "

## 2017-08-02 LAB
BACTERIA SPEC CULT: NO GROWTH
BACTERIA SPEC CULT: NO GROWTH
Lab: NORMAL
Lab: NORMAL
MICRO REPORT STATUS: NORMAL
MICRO REPORT STATUS: NORMAL
SPECIMEN SOURCE: NORMAL
SPECIMEN SOURCE: NORMAL

## 2017-08-04 ENCOUNTER — OFFICE VISIT (OUTPATIENT)
Dept: FAMILY MEDICINE | Facility: CLINIC | Age: 67
End: 2017-08-04
Payer: MEDICARE

## 2017-08-04 VITALS
OXYGEN SATURATION: 100 % | WEIGHT: 185 LBS | SYSTOLIC BLOOD PRESSURE: 112 MMHG | HEIGHT: 60 IN | TEMPERATURE: 98 F | HEART RATE: 55 BPM | BODY MASS INDEX: 36.32 KG/M2 | DIASTOLIC BLOOD PRESSURE: 66 MMHG | RESPIRATION RATE: 16 BRPM

## 2017-08-04 DIAGNOSIS — N18.30 CKD (CHRONIC KIDNEY DISEASE) STAGE 3, GFR 30-59 ML/MIN (H): ICD-10-CM

## 2017-08-04 DIAGNOSIS — Z94.0 KIDNEY REPLACED BY TRANSPLANT: ICD-10-CM

## 2017-08-04 DIAGNOSIS — A08.11 NOROVIRUS: Primary | ICD-10-CM

## 2017-08-04 DIAGNOSIS — Z79.899 NEED FOR PROPHYLACTIC CHEMOTHERAPY: ICD-10-CM

## 2017-08-04 DIAGNOSIS — B19.10 HEPATITIS B INFECTION WITHOUT DELTA AGENT WITHOUT HEPATIC COMA, UNSPECIFIED CHRONICITY: ICD-10-CM

## 2017-08-04 DIAGNOSIS — Z48.298 AFTERCARE FOLLOWING ORGAN TRANSPLANT: ICD-10-CM

## 2017-08-04 DIAGNOSIS — Z79.52 LONG TERM CURRENT USE OF SYSTEMIC STEROIDS: ICD-10-CM

## 2017-08-04 DIAGNOSIS — Z94.0 KIDNEY TRANSPLANT STATUS, LIVING UNRELATED DONOR: ICD-10-CM

## 2017-08-04 LAB
ERYTHROCYTE [DISTWIDTH] IN BLOOD BY AUTOMATED COUNT: 13.9 % (ref 10–15)
HCT VFR BLD AUTO: 37.7 % (ref 40–53)
HGB BLD-MCNC: 12.7 G/DL (ref 13.3–17.7)
MCH RBC QN AUTO: 34 PG (ref 26.5–33)
MCHC RBC AUTO-ENTMCNC: 33.7 G/DL (ref 31.5–36.5)
MCV RBC AUTO: 101 FL (ref 78–100)
PLATELET # BLD AUTO: 54 10E9/L (ref 150–450)
RBC # BLD AUTO: 3.73 10E12/L (ref 4.4–5.9)
WBC # BLD AUTO: 3.7 10E9/L (ref 4–11)

## 2017-08-04 PROCEDURE — 36415 COLL VENOUS BLD VENIPUNCTURE: CPT | Performed by: FAMILY MEDICINE

## 2017-08-04 PROCEDURE — 85027 COMPLETE CBC AUTOMATED: CPT | Performed by: FAMILY MEDICINE

## 2017-08-04 PROCEDURE — 99495 TRANSJ CARE MGMT MOD F2F 14D: CPT | Performed by: FAMILY MEDICINE

## 2017-08-04 PROCEDURE — 80048 BASIC METABOLIC PNL TOTAL CA: CPT | Performed by: FAMILY MEDICINE

## 2017-08-04 NOTE — MR AVS SNAPSHOT
After Visit Summary   8/4/2017    Junior Patricia    MRN: [id number]           Patient Information     Date Of Birth          1950        Visit Information        Provider Department      8/4/2017 9:40 AM Dee De La Rosa,  [facility]        Today's Diagnoses     Norovirus    -  1    Hepatitis B infection without delta agent without hepatic coma, unspecified chronicity        CKD (chronic kidney disease) stage 3, GFR 30-59 ml/min           Follow-ups after your visit        Additional Services     GASTROENTEROLOGY ADULT REF CONSULT ONLY       Preferred Location: [facility] [phone]      Please be aware that coverage of these services is subject to the terms and limitations of your health insurance plan.  Call member services at your health plan with any benefit or coverage questions.  Any procedures must be performed at a [place] facility OR coordinated by your clinic's referral office.    Please bring the following with you to your appointment:    (1) Any X-Rays, CTs or MRIs which have been performed.  Contact the facility where they were done to arrange for  prior to your scheduled appointment.    (2) List of current medications   (3) This referral request   (4) Any documents/labs given to you for this referral                  Your next 10 appointments already scheduled     Sep 29, 2017  8:00 AM CDT   LAB with [facility]   [facility]    [address] [place] [id number] [phone]           Patient must bring picture ID. Patient should be prepared to give a urine specimen  Please do not eat 10-12 hours before your appointment if you are coming in fasting for labs on lipids, cholesterol, or glucose (sugar). Pregnant women should follow their Care Team instructions. Water with medications is okay. Do not drink coffee or other fluids. If you have concerns about taking  your medications, please ask at  "office or if scheduling via LilaKutu, send a message by clicking on Secure Messaging, Message Your Care Team.            Sep 29, 2017  8:15 AM CDT   Nurse Only with CR BRONZE MA/LPN   [facility] ([facility])    [address] [place] [id number] [phone]              Who to contact     If you have questions or need follow up information about today's clinic visit or your schedule please contact [facility] directly at [phone].  Normal or non-critical lab and imaging results will be communicated to you by [place], letter or phone within 4 business days after the clinic has received the results. If you do not hear from us within 7 days, please contact the clinic through [place] or phone. If you have a critical or abnormal lab result, we will notify you by phone as soon as possible.  Submit refill requests through LilaKutu or call your pharmacy and they will forward the refill request to us. Please allow 3 business days for your refill to be completed.          Additional Information About Your Visit        LilaKutu Information     LilaKutu lets you send messages to your doctor, view your test results, renew your prescriptions, schedule appointments and more. To sign up, go to [email or web address] . Click on \"Log in\" on the left side of the screen, which will take you to the Welcome page. Then click on \"Sign up Now\" on the right side of the page.     You will be asked to enter the access code listed below, as well as some personal information. Please follow the directions to create your username and password.     Your access code is: [email or web address]  Expires: 2017 12:04 PM     Your access code will  in 90 days. If you need help or a new code, please call your [facility] or [phone].        Care EveryWhere ID     This is your Care EveryWhere ID. This could be used by other organizations to access your [facility] medical " records  [id number]        Your Vitals Were     Pulse Temperature Respirations Height Pulse Oximetry BMI (Body Mass Index)    55 98  F (36.7  C) (Oral) 16 5' (1.524 m) 100% 36.13 kg/m2       Blood Pressure from Last 3 Encounters:   08/04/17 112/66   07/28/17 129/76   06/27/17 102/70    Weight from Last 3 Encounters:   08/04/17 185 lb (83.9 kg)   07/28/17 189 lb 11.2 oz (86 kg)   05/09/17 189 lb (85.7 kg)              We Performed the Following     Basic metabolic panel     GASTROENTEROLOGY ADULT REF CONSULT ONLY          Today's Medication Changes          These changes are accurate as of: 8/4/17 10:49 AM.  If you have any questions, ask your nurse or doctor.               These medicines have changed or have updated prescriptions.        Dose/Directions    cycloSPORINE modified 25 MG capsule   Commonly known as:  GENERIC EQUIVALENT   This may have changed:  Another medication with the same name was removed. Continue taking this medication, and follow the directions you see here.        Dose:  50 mg   Take 50 mg by mouth 3 times daily   Refills:  0         Stop taking these medicines if you haven't already. Please contact your care team if you have questions.     ACE/ARB NOT PRESCRIBED (INTENTIONAL)   Stopped by:  Dee De La Rosa DO           albuterol 108 (90 BASE) MCG/ACT Inhaler   Commonly known as:  PROAIR HFA/PROVENTIL HFA/VENTOLIN HFA   Stopped by:  Dee De La Rosa DO                    Primary Care Provider    Clinic [facility]       No address on file        Equal Access to Services     [place] : Hadii sherry ortezo Sosommerali, waaxda luqadaha, qaybta kaalmada adeegyada, waxay desiree sears . So [facility] [place] [phone].    ATENCIÓN: Si habla español, tiene a murdock disposición servicios gratuitos de asistencia lingüística. Llame al [phone].    We comply with applicable federal civil rights laws and [place] laws. We do not discriminate on the basis of race, color,  national origin, age, disability sex, sexual orientation or gender identity.            Thank you!     Thank you for choosing [facility]  for your care. Our goal is always to provide you with excellent care. Hearing back from our patients is one way we can continue to improve our services. Please take a few minutes to complete the written survey that you may receive in the mail after your visit with us. Thank you!             Your Updated Medication List - Protect others around you: Learn how to safely use, store and throw away your medicines at www.disposemymeds.org.          This list is accurate as of: 8/4/17 10:49 AM.  Always use your most recent med list.                   Brand Name Dispense Instructions for use Diagnosis    aspirin 81 MG EC tablet      Take 81 mg by mouth daily        CELLCEPT tablet      Take 1,000 mg by mouth 2 times daily        cycloSPORINE modified 25 MG capsule    GENERIC EQUIVALENT     Take 50 mg by mouth 3 times daily        DILTIAZEM  MG 24 hr capsule   Generic drug:  diltiazem      Take 120 mg by mouth daily        metoprolol 25 MG tablet    LOPRESSOR     Take 12.5 mg by mouth 2 times daily        predniSONE 5 MG tablet    DELTASONE     Take 5 mg by mouth daily

## 2017-08-04 NOTE — PROGRESS NOTES
SUBJECTIVE:                                                    Junior Patricia is a 67 year old male who presents to clinic today for the following health issues:    Hospital Follow-up Visit:    Hospital/Nursing Home/IP Rehab Facility: [facility]  Date of Admission: 7/26/17  Date of Discharge: 7/28/17  Reason(s) for Admission: Abdominal pain-Colitis, Septic            Problems taking medications regularly:  None       Medication changes since discharge: None       Problems adhering to non-medication therapy:  None    Summary of hospitalization:  [facility] discharge summary reviewed  Diagnostic Tests/Treatments reviewed.  Follow up needed: BMP  Other Healthcare Providers Involved in Patient s Care:         Specialist appointment - GI  Update since discharge: improved.     Post Discharge Medication Reconciliation: discharge medications reconciled, continue medications without change.  Plan of care communicated with patient     Coding guidelines for this visit:  Type of Medical   Decision Making Face-to-Face Visit       within 7 Days of discharge Face-to-Face Visit        within 14 days of discharge   Moderate Complexity [id number]   High Complexity [id number]     Junior Patricia was admitted on 7/26/2017 by Jey Gordon DO and I would refer you to their history and physical.  The following problems were addressed during his hospitalization:     1. Colitis. Secondary to Norovirus infection.  Symptoms improving.  2. Bacteremia.  1 blood culture with Staph epidermidis.  Likely contaminant.  ID consulted during staay.  Was initially on IV Vanco and Zosyn after blood culture came back positive.  Now antibiotics are stopped.  3. Kidney transplant.  Continue Prednisone, Cellcept, Gengraf.  4. HTN.  Continue Metoprolol, Diltiazem.  5. Diabetes Mellitus.  HgbA1c only 5.9.  Follow up with PCP as outpatient.  6. Hepatitis B.  Follow up with GI as outpatient.  7. Thrombocytopenia.  Chronic.  Likely due  to chronic disease (Liver).  Avoid heparin products if possible.                   Problem list and histories reviewed & adjusted, as indicated.  Additional history: as documented    Patient Active Problem List   Diagnosis     Kidney replaced by transplant     Obesity     Gout     HYPERLIPIDEMIA LDL GOAL <100     CKD (chronic kidney disease) stage 3, GFR 30-59 ml/min     Colon polyps     Hepatitis B infection     Type 2 diabetes mellitus with hemoglobin A1c goal of less than 8.0% (H)     Macrocytosis without anemia     Asthma exacerbation     Colitis     Past Surgical History:   Procedure Laterality Date     KNEE SURGERY       TRANSPLANT KIDNEY RECIPIENT LIVING RELATED      uric acid nephropathy       Social History   Substance Use Topics     Smoking status: Never Smoker     Smokeless tobacco: Never Used     Alcohol use No     History reviewed. No pertinent family history.          Reviewed and updated as needed this visit by clinical staff       Reviewed and updated as needed this visit by Provider         ROS:  Constitutional, HEENT, cardiovascular, pulmonary, gi and gu systems are negative, except as otherwise noted.      OBJECTIVE:   /66 (BP Location: Right arm, Patient Position: Chair, Cuff Size: Adult Regular)  Pulse 55  Temp 98  F (36.7  C) (Oral)  Resp 16  Ht 5' (1.524 m)  Wt 185 lb (83.9 kg)  SpO2 100%  BMI 36.13 kg/m2  Body mass index is 36.13 kg/(m^2).  GENERAL: healthy, alert and no distress  RESP: lungs clear to auscultation - no rales, rhonchi or wheezes  CV: regular rate and rhythm, normal S1 S2, no S3 or S4, no murmur, click or rub, no peripheral edema and peripheral pulses strong  ABDOMEN: soft, nontender, no hepatosplenomegaly, no masses and bowel sounds normal    ASSESSMENT/PLAN:     1. Norovirus  - Resolved Norovirus colitis   - Will recheck BMP to make sure his labs are going back to normal     2. Hepatitis B infection without delta agent without hepatic coma, unspecified  chronicity  - Patient has seen GI in the past, but it's been years  - Will provide a referral for him to get back in to see them   - GASTROENTEROLOGY ADULT REF CONSULT ONLY    3. CKD (chronic kidney disease) stage 3, GFR 30-59 ml/min  - Basic metabolic panel    F/U with PCP in 1-2 months or PRN     Dee De La Rosa DO  [facility]

## 2017-08-04 NOTE — LETTER
Junior Jasso Harshad  [address] [place] [address]        August 9, 2017          Dear ,    We are writing to inform you of your test results.    Your test results remain stable.  Please continue with current treatment plan.    Resulted Orders   Basic metabolic panel   Result Value Ref Range    Sodium 140 133 - 144 mmol/L    Potassium 4.2 3.4 - 5.3 mmol/L    Chloride 111 (H) 94 - 109 mmol/L    Carbon Dioxide 21 20 - 32 mmol/L    Anion Gap 8 3 - 14 mmol/L    Glucose 110 (H) 70 - 99 mg/dL    Urea Nitrogen 17 7 - 30 mg/dL    Creatinine 1.36 (H) 0.66 - 1.25 mg/dL    GFR Estimate 52 (L) >60 mL/min/1.7m2      Comment:      Non  GFR Calc    GFR Estimate If Black 63 >60 mL/min/1.7m2      Comment:       GFR Calc    Calcium 8.8 8.5 - 10.1 mg/dL       If you have any questions or concerns, please call the clinic at the number listed above.       Sincerely,               Dee De La Rosa, DO

## 2017-08-04 NOTE — NURSING NOTE
Chief Complaint   Patient presents with     Hospital F/U       Initial There were no vitals taken for this visit. Estimated body mass index is 37.05 kg/(m^2) as calculated from the following:    Height as of 7/26/17: 5' (1.524 m).    Weight as of 7/28/17: 189 lb 11.2 oz (86 kg).  Medication Reconciliation: judith Sidhu CMA

## 2017-08-05 LAB
ANION GAP SERPL CALCULATED.3IONS-SCNC: 8 MMOL/L (ref 3–14)
BUN SERPL-MCNC: 17 MG/DL (ref 7–30)
CALCIUM SERPL-MCNC: 8.8 MG/DL (ref 8.5–10.1)
CHLORIDE SERPL-SCNC: 111 MMOL/L (ref 94–109)
CO2 SERPL-SCNC: 21 MMOL/L (ref 20–32)
CREAT SERPL-MCNC: 1.36 MG/DL (ref 0.66–1.25)
GFR SERPL CREATININE-BSD FRML MDRD: 52 ML/MIN/1.7M2
GLUCOSE SERPL-MCNC: 110 MG/DL (ref 70–99)
POTASSIUM SERPL-SCNC: 4.2 MMOL/L (ref 3.4–5.3)
SODIUM SERPL-SCNC: 140 MMOL/L (ref 133–144)

## 2017-09-29 NOTE — MR AVS SNAPSHOT
After Visit Summary   9/29/2017    Junior Patricia    MRN: [id number]           Patient Information     Date Of Birth          1950        Visit Information        Provider Department      9/29/2017 8:15 AM ALLI TOLBERT MA/FRAN [facility]        Today's Diagnoses     Kidney replaced by transplant    -  1       Follow-ups after your visit        Your next 10 appointments already scheduled     Oct 27, 2017  9:00 AM CDT   LAB with CR LAB   [facility] ([facility]    [address] [address] [phone]           Patient must bring picture ID. Patient should be prepared to give a urine specimen  Please do not eat 10-12 hours before your appointment if you are coming in fasting for labs on lipids, cholesterol, or glucose (sugar). Pregnant women should follow their Care Team instructions. Water with medications is okay. Do not drink coffee or other fluids. If you have concerns about taking  your medications, please ask at office or if scheduling via [employer], send a message by clicking on Secure Messaging, Message Your Care Team.            Oct 27, 2017  9:15 AM CDT   Nurse Only with ALLI GRIFFIN   [facility] ([facility])    [address] [address] [phone]              Who to contact     If you have questions or need follow up information about today's clinic visit or your schedule please contact [facility] directly at [phone].  Normal or non-critical lab and imaging results will be communicated to you by MyChart, letter or phone within 4 business days after the clinic has received the results. If you do not hear from us within 7 days, please contact the clinic through [employer] or phone. If you have a critical or abnormal lab result, we will notify you by phone as soon as possible.  Submit refill requests through Seatw[employer]et or  "call your pharmacy and they will forward the refill request to us. Please allow 3 business days for your refill to be completed.          Additional Information About Your Visit        MyChart Information     Int[employer] Learning Class lets you send messages to your doctor, view your test results, renew your prescriptions, schedule appointments and more. To sign up, go to [email or web address] . Click on \"Log in\" on the left side of the screen, which will take you to the Welcome page. Then click on \"Sign up Now\" on the right side of the page.     You will be asked to enter the access code listed below, as well as some personal information. Please follow the directions to create your username and password.     Your access code is: [email or web address]  Expires: 2017 10:11 AM     Your access code will  in 90 days. If you need help or a new code, please call your [place] clinic or [phone].        Care EveryWhere ID     This is your Care EveryWhere ID. This could be used by other organizations to access your [place] medical records  [id number]        Your Vitals Were     Pulse BMI (Body Mass Index)                60 37.03 kg/m2           Blood Pressure from Last 3 Encounters:   17 120/60   17 112/66   17 129/76    Weight from Last 3 Encounters:   17 189 lb 9.6 oz (86 kg)   17 185 lb (83.9 kg)   17 189 lb 11.2 oz (86 kg)              Today, you had the following     No orders found for display       Primary Care Provider Office Phone # Fax #    [facility] [phone] [address]  [facility] [address]        Equal Access to Services     KING RAND : Hadii sherry Abrams, anjelica castro, lonnie kaalmada flo, rigoberto swartz. So [facility] [phone].    ATENCIÓN: Si habla español, tiene a murdock disposición servicios gratuitos de asistencia lingüística. Llame al [phone].    We comply with applicable federal " civil rights laws and [place] laws. We do not discriminate on the basis of race, color, national origin, age, disability sex, sexual orientation or gender identity.            Thank you!     Thank you for choosing [facility]  for your care. Our goal is always to provide you with excellent care. Hearing back from our patients is one way we can continue to improve our services. Please take a few minutes to complete the written survey that you may receive in the mail after your visit with us. Thank you!             Your Updated Medication List - Protect others around you: Learn how to safely use, store and throw away your medicines at www.disposemymeds.org.          This list is accurate as of: 9/29/17 10:11 AM.  Always use your most recent med list.                   Brand Name Dispense Instructions for use Diagnosis    aspirin 81 MG EC tablet      Take 81 mg by mouth daily        CELLCEPT tablet      Take 1,000 mg by mouth 2 times daily        cycloSPORINE modified 25 MG capsule    GENERIC EQUIVALENT     Take 50 mg by mouth 3 times daily        DILTIAZEM  MG 24 hr capsule   Generic drug:  diltiazem      Take 120 mg by mouth daily        metoprolol 25 MG tablet    LOPRESSOR     Take 12.5 mg by mouth 2 times daily        predniSONE 5 MG tablet    DELTASONE     Take 5 mg by mouth daily

## 2017-09-29 NOTE — NURSING NOTE
Junior Patricia is a 67 year old male who comes in today for a Blood Pressure check per Dr. Codi Marquez at [facility] due to history of kidney transplant in 2004.  Vitals should be faxed to # [phone], Attn: Post Nurse.  # [phone].        Vitals as recorded, a large cuff was used.    Patient is taking medication as prescribed but is not on blood pressure medication  Patient is tolerating medications well.  Patient is not monitoring Blood Pressure at home.      Current complaints: none    Disposition: Good.  Vitals faxed to [facility] Attn: Post Nurse.    Manda Garg M.A.

## 2017-10-02 NOTE — IP AVS SNAPSHOT
MRN:[id number]                      After Visit Summary   10/2/2017    Junior Patricia    MRN: [id number]           Thank you!     Thank you for choosing [facility] for your care. Our goal is always to provide you with excellent care. Hearing back from our patients is one way we can continue to improve our services. Please take a few minutes to complete the written survey that you may receive in the mail after you visit. If you would like to speak to someone directly about your visit please contact Patient Relations at [phone]. Thank you!          Patient Information     Date Of Birth          1950        Designated Caregiver       Most Recent Value    Caregiver    Will someone help with your care after discharge? yes    Name of designated caregiver Glendy Patricia    Phone number of caregiver [phone]    Caregiver address same as patient      About your hospital stay     You were admitted on:  October 2, 2017 You last received care in the:  [place] [address] [facility]    You were discharged on:  October 6, 2017        Reason for your hospital stay       sepsis                  Who to Call     For medical emergencies, please call 911.  For non-urgent questions about your medical care, please call your primary care provider or clinic, [phone]          Attending Provider     Provider Specialty    Jeb Lorenzo MD Emergency Medicine    [facility], Kina Alonso MD Internal Medicine       Primary Care Provider Office Phone # Fax #    [facility] [phone]      Follow-up Appointments     Follow-up and recommended labs and tests        Follow up with primary clinic in 5-7 days.  Follow up with kidney specialist in 10-14 days.                  Your next 10 appointments already scheduled     Oct 27, 2017  9:00 AM CDT   LAB with CR LAB   [facility]    [address]  "[place] [id number] [phone]           Patient must bring picture ID. Patient should be prepared to give a urine specimen  Please do not eat 10-12 hours before your appointment if you are coming in fasting for labs on lipids, cholesterol, or glucose (sugar). Pregnant women should follow their Care Team instructions. Water with medications is okay. Do not drink coffee or other fluids. If you have concerns about taking  your medications, please ask at office or if scheduling via [employer], send a message by clicking on Secure Messaging, Message Your Care Team.            Oct 27, 2017  9:15 AM CDT   Nurse Only with CR BRONZE MA/LPN   [facility] ([facility])    [address] [place] [address] [phone]              Pending Results     Date and Time Order Name Status Description    10/3/2017 0110 Fungus Culture, non-blood Preliminary     10/3/2017 0110 Anaerobic bacterial culture Preliminary     10/3/2017 0110 Fluid Culture Aerobic Bacterial Preliminary             Statement of Approval     Ordered          10/06/17 1429  I have reviewed and agree with all the recommendations and orders detailed in this document.  EFFECTIVE NOW     Approved and electronically signed by:  Lon Hsieh MD             Admission Information     Date & Time Provider Department Dept. Phone    10/2/2017 Kina Fischer MD [place] [address] [facility] [phone]      Your Vitals Were     Blood Pressure Pulse Temperature Respirations Height Weight    124/78 (BP Location: Right arm) 80 98.1  F (36.7  C) (Oral) 16 1.524 m (5') 87.8 kg (193 lb 9.6 oz)    Pulse Oximetry BMI (Body Mass Index)                100% 37.81 kg/m2          MyCharDouguo Information     [employer] lets you send messages to your doctor, view your test results, renew your prescriptions, schedule appointments and more. To sign up, go to [email or web address] . Click on \"Log in\" on the left side of " "the screen, which will take you to the Welcome page. Then click on \"Sign up Now\" on the right side of the page.     You will be asked to enter the access code listed below, as well as some personal information. Please follow the directions to create your username and password.     Your access code is: [email or web address]  Expires: 2017 10:11 AM     Your access code will  in 90 days. If you need help or a new code, please call your [place] clinic or [phone].        Care EveryWhere ID     This is your Care EveryWhere ID. This could be used by other organizations to access your [place] medical records  [id number]        Equal Access to Services     KING RAND : Razia Abrams, anjelica castro, lonnie rossi, rigoberto swartz. So [facility] [phone].    ATENCIÓN: Si habla español, tiene a murdock disposición servicios gratuitos de asistencia lingüística. Llame al [phone].    We comply with applicable federal civil rights laws and [place] laws. We do not discriminate on the basis of race, color, national origin, age, disability, sex, sexual orientation, or gender identity.               Review of your medicines      START taking        Dose / Directions    levofloxacin 500 MG tablet   Commonly known as:  LEVAQUIN   Used for:  Severe sepsis (H)        Dose:  500 mg   Take 1 tablet (500 mg) by mouth daily   Quantity:  10 tablet   Refills:  0         CONTINUE these medicines which have NOT CHANGED        Dose / Directions    aspirin 81 MG EC tablet        Dose:  81 mg   Take 81 mg by mouth daily   Refills:  0       CELLCEPT 500 MG tablet        Dose:  1000 mg   Take 1,000 mg by mouth 2 times daily   Refills:  0       * cycloSPORINE modified 25 MG capsule   Commonly known as:  GENERIC EQUIVALENT        Dose:  50 mg   Take 50 mg by mouth every morning   Refills:  0       * cycloSPORINE modified 25 MG capsule   Commonly known as:  GENERIC EQUIVALENT        " Dose:  25 mg   Take 25 mg by mouth every evening   Refills:  0       DILTIAZEM  MG 24 hr capsule   Generic drug:  diltiazem        Dose:  120 mg   Take 120 mg by mouth daily   Refills:  0       metoprolol 25 MG tablet   Commonly known as:  LOPRESSOR        Dose:  12.5 mg   Take 12.5 mg by mouth 2 times daily   Refills:  0       predniSONE 5 MG tablet   Commonly known as:  DELTASONE        Dose:  5 mg   Take 5 mg by mouth daily   Refills:  0       * Notice:  This list has 2 medication(s) that are the same as other medications prescribed for you. Read the directions carefully, and ask your doctor or other care provider to review them with you.         Where to get your medicines      These medications were sent to [facility] [address] [place] [id number] [facility] [address]     Phone:  [phone]     levofloxacin 500 MG tablet               ANTIBIOTIC INSTRUCTION     You've Been Prescribed an Antibiotic - Now What?  Your healthcare team thinks that you or your loved one might have an infection. Some infections can be treated with antibiotics, which are powerful, life-saving drugs. Like all medications, antibiotics have side effects and should only be used when necessary. There are some important things you should know about your antibiotic treatment.      Your healthcare team may run tests before you start taking an antibiotic.    Your team may take samples (e.g., from your blood, urine or other areas) to run tests to look for bacteria. These test can be important to determine if you need an antibiotic at all and, if you do, which antibiotic will work best.      Within a few days, your healthcare team might change or even stop your antibiotic.    Your team may start you on an antibiotic while they are working to find out what is making you sick.    Your team might change your antibiotic because test results show that a different antibiotic would be better  to treat your infection.    In some cases, once your team has more information, they learn that you do not need an antibiotic at all. They may find out that you don't have an infection, or that the antibiotic you're taking won't work against your infection. For example, an infection caused by a virus can't be treated with antibiotics. Staying on an antibiotic when you don't need it is more likely to be harmful than helpful.      You may experience side effects from your antibiotic.    Like all medications, antibiotics have side effects. Some of these can be serious.    Let you healthcare team know if you have any known allergies when you are admitted to the hospital.    One significant side effect of nearly all antibiotics is the risk of severe and sometimes deadly diarrhea caused by Clostridium difficile (C. Difficile). This occurs when a person takes antibiotics because some good germs are destroyed. Antibiotic use allows C. diificile to take over, putting patients at high risk for this serious infection.    As a patient or caregiver, it is important to understand your or your loved one's antibiotic treatment. It is especially important for caregivers to speak up when patients can't speak for themselves. Here are some important questions to ask your healthcare team.    What infection is this antibiotic treating and how do you know I have that infection?    What side effects might occur from this antibiotic?    How long will I need to take this antibiotic?    Is it safe to take this antibiotic with other medications or supplements (e.g., vitamins) that I am taking?     Are there any special directions I need to know about taking this antibiotic? For example, should I take it with food?    How will I be monitored to know whether my infection is responding to the antibiotic?    What tests may help to make sure the right antibiotic is prescribed for me?      Information provided by:  www.cdc.gov/getsmart  U.S.  Department of Health and Human Services  Centers for disease Control and Prevention  National Center for Emerging and Zoonotic Infectious Diseases  Division of Healthcare Quality Promotion         Protect others around you: Learn how to safely use, store and throw away your medicines at www.disposemymeds.org.             Medication List: This is a list of all your medications and when to take them. Check marks below indicate your daily home schedule. Keep this list as a reference.      Medications           Morning Afternoon Evening Bedtime As Needed    aspirin 81 MG EC tablet   Take 81 mg by mouth daily   Next Dose Due:  Tomorrow morning (10/7)                                   CELLCEPT 500 MG tablet   Take 1,000 mg by mouth 2 times daily   Next Dose Due:  This evening (10/6)                                      * cycloSPORINE modified 25 MG capsule   Commonly known as:  GENERIC EQUIVALENT   Take 50 mg by mouth every morning   Last time this was given:  9:34am today, 10/6   Next Dose Due:  Tomorrow morning (10/7)                                   * cycloSPORINE modified 25 MG capsule   Commonly known as:  GENERIC EQUIVALENT   Take 25 mg by mouth every evening   Next Dose Due:  Take this evening (10/6)                                   DILTIAZEM  MG 24 hr capsule   Take 120 mg by mouth daily   Last time this was given:  120 mg on 10/6/2017  9:34 AM   Generic drug:  diltiazem   Next Dose Due:  Tomorrow morning (10/7)                                   levofloxacin 500 MG tablet   Commonly known as:  LEVAQUIN   Take 1 tablet (500 mg) by mouth daily   Next Dose Due:  Take when you get home this evening.                                    metoprolol 25 MG tablet   Commonly known as:  LOPRESSOR   Take 12.5 mg by mouth 2 times daily   Last time this was given:  12.5 mg on 10/6/2017  9:34 AM   Next Dose Due:  Tomorrow morning (10/7)                                   predniSONE 5 MG tablet   Commonly known as:   DELTASONE   Take 5 mg by mouth daily   Last time this was given:  5 mg on 10/6/2017  9:34 AM   Next Dose Due:  Tomorrow morning (10/7)                                   * Notice:  This list has 2 medication(s) that are the same as other medications prescribed for you. Read the directions carefully, and ask your doctor or other care provider to review them with you.

## 2017-10-02 NOTE — TELEPHONE ENCOUNTER
ALEXANDRA [place]   Pt informed. States considering whether or not he will go. Informed again Sha strongly recommends that you go to the ER as soon as possible.   Mor Martin RN

## 2017-10-02 NOTE — TELEPHONE ENCOUNTER
Please call patient. Has not yet reported to ER. Strongly recommending being seen there ASAP.     -Sha Obrien, PAC

## 2017-10-02 NOTE — MR AVS SNAPSHOT
After Visit Summary   10/2/2017    Junior Patricia    MRN: [id number]           Patient Information     Date Of Birth          1950        Visit Information        Provider Department      10/2/2017 11:30 AM Lenin Obrien PA-C [facility]        Today's Diagnoses     Fever, unspecified fever cause    -  1       Follow-ups after your visit        Your next 10 appointments already scheduled     Oct 27, 2017  9:00 AM CDT   LAB with ALLI LAB   [facility] ([facility])    [address] [address] [phone]           Patient must bring picture ID. Patient should be prepared to give a urine specimen  Please do not eat 10-12 hours before your appointment if you are coming in fasting for labs on lipids, cholesterol, or glucose (sugar). Pregnant women should follow their Care Team instructions. Water with medications is okay. Do not drink coffee or other fluids. If you have concerns about taking  your medications, please ask at office or if scheduling via [employer], send a message by clicking on Secure Messaging, Message Your Care Team.            Oct 27, 2017  9:15 AM CDT   Nurse Only with CR BRONZE MA/LPN   [facility] ([facility])    [address] [address] [phone]              Who to contact     If you have questions or need follow up information about today's clinic visit or your schedule please contact [facility] directly at [phone].  Normal or non-critical lab and imaging results will be communicated to you by MyChart, letter or phone within 4 business days after the clinic has received the results. If you do not hear from us within 7 days, please contact the clinic through MyChart or phone. If you have a critical or abnormal lab result, we will notify you by phone as soon as possible.  Submit refill requests through  "Ramesht or call your pharmacy and they will forward the refill request to us. Please allow 3 business days for your refill to be completed.          Additional Information About Your Visit        MyChart Information     [employer]hart lets you send messages to your doctor, view your test results, renew your prescriptions, schedule appointments and more. To sign up, go to [email or web address] . Click on \"Log in\" on the left side of the screen, which will take you to the Welcome page. Then click on \"Sign up Now\" on the right side of the page.     You will be asked to enter the access code listed below, as well as some personal information. Please follow the directions to create your username and password.     Your access code is: [email or web address]  Expires: 2017 10:11 AM     Your access code will  in 90 days. If you need help or a new code, please call your [place] clinic or [phone].        Care EveryWhere ID     This is your Care EveryWhere ID. This could be used by other organizations to access your [place] medical records  [id number]        Your Vitals Were     Pulse Temperature Respirations Pulse Oximetry BMI (Body Mass Index)       94 103.2  F (39.6  C) (Oral) 22 99% 36.91 kg/m2        Blood Pressure from Last 3 Encounters:   10/02/17 130/90   17 120/60   17 112/66    Weight from Last 3 Encounters:   10/02/17 189 lb (85.7 kg)   17 189 lb 9.6 oz (86 kg)   17 185 lb (83.9 kg)              We Performed the Following     *UA reflex to Microscopic and Culture ([place] and [facility] (except [place] and [place])     CBC with platelets and differential     ESR: Erythrocyte sedimentation rate     JUST IN CASE     Urine Microscopic        Primary Care Provider Office Phone # Fax #    [facility] [phone] [address] [facility] [address]        Equal Access to Services     KING RAND AH: anjelica Perry " lonnie castrokaciserge magallonrigoberto okeefe ah. So [facility] [phone].    ATENCIÓN: Si jennifer villanueva, tiene a murdock disposición servicios gratuitos de asistencia lingüística. Stephename al [phone].    We comply with applicable federal civil rights laws and [place] laws. We do not discriminate on the basis of race, color, national origin, age, disability, sex, sexual orientation, or gender identity.            Thank you!     Thank you for choosing [facility]  for your care. Our goal is always to provide you with excellent care. Hearing back from our patients is one way we can continue to improve our services. Please take a few minutes to complete the written survey that you may receive in the mail after your visit with us. Thank you!             Your Updated Medication List - Protect others around you: Learn how to safely use, store and throw away your medicines at www.disposemymeds.org.          This list is accurate as of: 10/2/17  1:32 PM.  Always use your most recent med list.                   Brand Name Dispense Instructions for use Diagnosis    aspirin 81 MG EC tablet      Take 81 mg by mouth daily        CELLCEPT tablet      Take 1,000 mg by mouth 2 times daily        cycloSPORINE modified 25 MG capsule    GENERIC EQUIVALENT     Take 50 mg by mouth 3 times daily        DILTIAZEM  MG 24 hr capsule   Generic drug:  diltiazem      Take 120 mg by mouth daily        metoprolol 25 MG tablet    LOPRESSOR     Take 12.5 mg by mouth 2 times daily        predniSONE 5 MG tablet    DELTASONE     Take 5 mg by mouth daily

## 2017-10-02 NOTE — NURSING NOTE
Chief Complaint   Patient presents with     Fever       Initial /90 (BP Location: Right arm, Patient Position: Chair, Cuff Size: Adult Large)  Pulse 94  Temp 103.2  F (39.6  C) (Oral)  Resp 22  Wt 189 lb (85.7 kg)  SpO2 99%  BMI 36.91 kg/m2 Estimated body mass index is 36.91 kg/(m^2) as calculated from the following:    Height as of 8/4/17: 5' (1.524 m).    Weight as of this encounter: 189 lb (85.7 kg).  Medication Reconciliation: complete.Nish CHATTERJEE MA

## 2017-10-02 NOTE — PROGRESS NOTES
SUBJECTIVE:   Junior Patricia is a 67 year old male who presents to clinic today for the following health issues:      Acute Illness   Acute illness concerns: fever, bodyaches  Onset: 2 days    Fever: YES    Chills/Sweats: yes, chills    Headache (location?): YES    Sinus Pressure:no    Conjunctivitis:  no    Ear Pain: no    Rhinorrhea: no    Congestion: no    Sore Throat: no     Cough: YES - slight    Wheeze: no    Decreased Appetite: no    Nausea: no    Vomiting: no    Diarrhea:  YES- slight, more gas than usual     Dysuria/Freq.: no    Fatigue/Achiness: YES- achiness     Sick/Strep Exposure: no     Therapies Tried and outcome: tylenol every 4 hrs.     Of note, was hospitalized with norovirus and questionable sepsis (possible contaminated blood culture) in July as well as CAP in May.     Patient also has yet to follow up with GI. Has history of hepatitis B. States has not followed up due to cost.     S/p renal transplant and chronically anticoagulated.     Problem list and histories reviewed & adjusted, as indicated.  Additional history: as documented    Patient Active Problem List   Diagnosis     Kidney replaced by transplant     Obesity     Gout     HYPERLIPIDEMIA LDL GOAL <100     CKD (chronic kidney disease) stage 3, GFR 30-59 ml/min     Colon polyps     Hepatitis B infection     Type 2 diabetes mellitus with hemoglobin A1c goal of less than 8.0% (H)     Macrocytosis without anemia     Asthma exacerbation     Colitis     Past Surgical History:   Procedure Laterality Date     KNEE SURGERY       TRANSPLANT KIDNEY RECIPIENT LIVING RELATED      uric acid nephropathy       Social History   Substance Use Topics     Smoking status: Never Smoker     Smokeless tobacco: Never Used     Alcohol use No     History reviewed. No pertinent family history.      Current Outpatient Prescriptions   Medication Sig Dispense Refill     predniSONE (DELTASONE) 5 MG tablet Take 5 mg by mouth daily       aspirin 81 MG EC tablet Take 81  mg by mouth daily       diltiazem (DILTIAZEM CD) 120 MG 24 hr capsule Take 120 mg by mouth daily       cycloSPORINE modified (GENERIC EQUIVALENT) 25 MG capsule Take 50 mg by mouth 3 times daily        metoprolol (LOPRESSOR) 25 MG tablet Take 12.5 mg by mouth 2 times daily        mycophenolate (CELLCEPT) 500 MG tablet Take 1,000 mg by mouth 2 times daily        Allergies   Allergen Reactions     Lisinopril Swelling     No Known Drug Allergies      Simvastatin Itching     BP Readings from Last 3 Encounters:   10/02/17 130/90   09/29/17 120/60   08/04/17 112/66    Wt Readings from Last 3 Encounters:   10/02/17 189 lb (85.7 kg)   09/29/17 189 lb 9.6 oz (86 kg)   08/04/17 185 lb (83.9 kg)                        Reviewed and updated as needed this visit by clinical staffTobacco  Allergies  Meds  Problems  Med Hx  Surg Hx  Fam Hx  Soc Hx        Reviewed and updated as needed this visit by Provider  Allergies  Meds  Problems         ROS:  Constitutional, HEENT, cardiovascular, pulmonary, gi and gu systems are negative, except as otherwise noted.      OBJECTIVE:   /90 (BP Location: Right arm, Patient Position: Chair, Cuff Size: Adult Large)  Pulse 94  Temp 103.2  F (39.6  C) (Oral)  Resp 22  Wt 189 lb (85.7 kg)  SpO2 99%  BMI 36.91 kg/m2  Body mass index is 36.91 kg/(m^2).  GENERAL: alert and no distress  RESP: lungs clear to auscultation - no rales, rhonchi or wheezes  CV: regular rates and rhythm, normal S1 S2, no S3 or S4 and no murmur, click or rub  ABDOMEN: distended. No obvious ascites present. Nontender, unable to palpate liver or spleen confidently secondary to bloating. no masses and bowel sounds normal  SKIN: slight ecchymosis noted over LUQ. Otherwise, no suspicious lesions or rashes  NEURO: Normal strength and tone, mentation intact and speech normal  BACK: no CVA tenderness, no paralumbar tenderness  PSYCH: mentation appears normal, affect normal/bright    Diagnostic Test Results:  Results  for orders placed or performed in visit on 10/02/17 (from the past 24 hour(s))   CBC with platelets and differential   Result Value Ref Range    WBC 12.5 (H) 4.0 - 11.0 10e9/L    RBC Count 3.49 (L) 4.4 - 5.9 10e12/L    Hemoglobin 11.6 (L) 13.3 - 17.7 g/dL    Hematocrit 35.3 (L) 40.0 - 53.0 %     (H) 78 - 100 fl    MCH 33.2 (H) 26.5 - 33.0 pg    MCHC 32.9 31.5 - 36.5 g/dL    RDW 14.6 10.0 - 15.0 %    Platelet Count 53 (L) 150 - 450 10e9/L    Diff Method Automated Method     % Neutrophils 83.6 %    % Lymphocytes 4.9 %    % Monocytes 11.3 %    % Eosinophils 0.1 %    % Basophils 0.1 %    Absolute Neutrophil 10.4 (H) 1.6 - 8.3 10e9/L    Absolute Lymphocytes 0.6 (L) 0.8 - 5.3 10e9/L    Absolute Monocytes 1.4 (H) 0.0 - 1.3 10e9/L    Absolute Eosinophils 0.0 0.0 - 0.7 10e9/L    Absolute Basophils 0.0 0.0 - 0.2 10e9/L   ESR: Erythrocyte sedimentation rate   Result Value Ref Range    Sed Rate 28 (H) 0 - 20 mm/h   *UA reflex to Microscopic and Culture ([place] and [facility] (except [place] and [place])   Result Value Ref Range    Color Urine Yellow     Appearance Urine Clear     Glucose Urine Negative NEG^Negative mg/dL    Bilirubin Urine Small (A) NEG^Negative    Ketones Urine Negative NEG^Negative mg/dL    Specific Gravity Urine 1.020 1.003 - 1.035    Blood Urine Small (A) NEG^Negative    pH Urine 6.5 5.0 - 7.0 pH    Protein Albumin Urine 100 (A) NEG^Negative mg/dL    Urobilinogen Urine 0.2 0.2 - 1.0 EU/dL    Nitrite Urine Negative NEG^Negative    Leukocyte Esterase Urine Negative NEG^Negative    Source Midstream Urine    Urine Microscopic   Result Value Ref Range    WBC Urine O - 2 OTO2^O - 2 /HPF    RBC Urine O - 2 OTO2^O - 2 /HPF    Bacteria Urine Few (A) NEG^Negative /HPF    Amorphous Crystals Few (A) NEG^Negative /HPF     CXR - negative    ASSESSMENT/PLAN:     (R50.9) Fever, unspecified fever cause  (primary encounter diagnosis)  Comment: this is a 67 yoM with a history of hepatitis B not currently  "managed by GI and likely cirrhosis based on July CT, chronic immunosuppression secondary to renal transplant, diabetes, and CKD stage 3. He presents today with a 2 days history of \"flu like symptoms\". Today on exam his is febrile while slightly tachypnea. Remainder of exam was nonspecific without obvious severe ascites/SBP on exam. However, CBC showed left shift. This in combination with with multiple risk factors, I am concerned of possible early SBP development vs sepsis. Discussed in detail with patient and recommended stat ER evaluation. Patient at first was reluctant due to cost, however eventually agreed to go to [facility] ER. ER was called and informed. Will continue to monitor chart. If has not reported to ER within 1 hour, will call patient.  Plan: CBC with platelets and differential, ESR:         Erythrocyte sedimentation rate, *UA reflex to         Microscopic and Culture ([place] and [place]         Clinics (except [place] and [place]), XR         Chest 2 Views, JUST IN CASE, Urine Microscopic              Follow up: as above     Lenin Obrien PA-C  [facility]"

## 2017-10-02 NOTE — IP AVS SNAPSHOT
[facility] [facility]    [address]    [facility] [address]    Phone:  [phone]    Fax:  [phone]                                       After Visit Summary   10/2/2017    Junior Patricia    MRN: [id number]           After Visit Summary Signature Page     I have received my discharge instructions, and my questions have been answered. I have discussed any challenges I see with this plan with the nurse or doctor.    ..........................................................................................................................................  Patient/Patient Representative Signature      ..........................................................................................................................................  Patient Representative Print Name and Relationship to Patient    ..................................................               ................................................  Date                                            Time    ..........................................................................................................................................  Reviewed by Signature/Title    ...................................................              ..............................................  Date                                                            Time

## 2017-10-03 PROBLEM — A41.9 SEPSIS (H): Status: ACTIVE | Noted: 2017-01-01

## 2017-10-03 NOTE — PLAN OF CARE
Problem: Patient Care Overview  Goal: Plan of Care/Patient Progress Review  Outcome: No Change  Patient was admitted to the floor around 0050. Temp max 101.9. Sats good on room air. IV zosyn and vanco given per plan of care. Simethicone effective for gas pain. Incontinent of loose stool, sample sent to rule out C-diff. Influenza results pending. Up with SBA, bed alarm on for safety. ong is primary language, but patient and family speak and understand English.  declined by patient and spouse, waiver signed. Ultrasound paracentesis ordered for today.     Stool sample sent to lab to rule out c-diff. Influenza swab is also pending.

## 2017-10-03 NOTE — CONSULTS
INFECTIOUS DISEASE CONSULTATION       REFERRING PHYSICIAN:  Dr. Kina Fischer.       IMPRESSION:   1.  A 67-year-old male with complicated medical history admitted with acute sepsis, found to have gram-negative rods in the blood, source not entirely clear, does have some abdominal distention, ascites, gallstones, kidney stones, but none as an obvious source at this point, full information on cultures pending.   2.  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   3.  Prior renal transplant 14 years ago with no major infection complications.   4.  Mild chronic renal insufficiency post-transplant.   5.  Uric acid nephropathy with kidney stones, no presumed renal functions so will unlikely to be source of infection.   6.  Gallstones, not previously known, no evidence of cholecystitis as cause of the current bacteremia.   7.  Latent tuberculosis, previously treated.      RECOMMENDATIONS:   1.  Continue Zosyn, can discontinue vancomycin, not worth the renal toxicity.   2.  Await full culture information and adjust.   3.  Already has had a CT scan, agree with tapping his abdominal fluid to see if it is peritonitis.   4.  Not otherwise obvious source seen with initial workup, if clinically improves, simply treat with antibiotics directed against the organism, if not clinically improving, bigger workup for source.      HISTORY:  Junior Patricia is a 67-year-old male seen in consultation.  He has a history of chronic hepatitis B and previous renal transplant 14 years ago.  He has not had major infection complications including no significant history of bacteremias, UTIs, pneumonias.  He was in his usual state of health until Saturday when he developed acute shaking chills without focal or localizing symptoms elsewhere.  He did subsequently develop some slight notable abdominal distention into Sunday and Monday and a couple of episodes of diarrhea.  No significant focal musculoskeletal pains, but some generalized  achiness.  No urinary tract symptoms.  No respiratory or cardiovascular symptoms.      At admission, cultures were obtained and blood cultures are growing gram-negative rods.  He has already had a CT scan of the abdomen that did show an old kidney stone in his ureter which, of course, is not a functional kidney.  His transplanted looked normal.  His urine was normal.  No significant evidence of pneumonia.  He is having a tap of his abdominal fluid that is pending as he did have some degree of ascites.      PAST MEDICAL HISTORY:  Chronic cirrhosis, history of diabetes, reasonably controlled, history of end-stage renal disease, 2004 transplant and has had no major infection complications since, prior history of latent tuberculosis treated prior to coming here from [facility].      ALLERGIES:  None to any antimicrobial.      SOCIAL AND FAMILY HISTORY:  A [place] immigrant, PPD positive, [place], no notable exposures, no recent travel, no one else he has been around that has been ill.      REVIEW OF SYSTEMS:  Some degree of mild abdominal discomfort, some degree of diarrhea, but it has now resolved.  No significant urinary tract symptoms, respiratory or cardiovascular symptoms.  Rest of review of systems is negative and he feels much better today, shaking chills have resolved.      PHYSICAL EXAMINATION:   IN GENERAL:  The patient appears his stated age; he does not look particularly toxic or ill.   VITAL SIGNS:  Afebrile currently after earlier 102 degree temperature, pulse of 100, respiratory rate 16 and unlabored.   HEENT:  No thrush or intraoral lesions.  Pupils reactive.   NECK:  Supple and nontender, no lymphadenopathy or thyromegaly.   HEART:  Rate of about 100, no significant murmurs.   LUNGS:  Relatively clear, a few rhonchi at the right base.   ABDOMEN:  Does seem somewhat distended, not particularly tender in either upper or lower abdomen.   EXTREMITIES:  No rashes, skin lesions, joint abnormalities of  note, has some slight edema.      LABORATORY:  White count 12,000.  Blood culture growing gram-negative rods.  CT scan with gallstones, some cirrhosis and kidney stone, presumably old, transplanted kidney looks okay.      Thank you very much for this consultation.  We will follow this patient with you.         KYRA BLANCO MD             D: 10/03/2017 11:47   T: 10/03/2017 12:07   MT: [id number]      Name:     TEE CRAIG   MRN:      [id number]        Account:       [id number]   :      1950           Consult Date:  10/03/2017      Document: [id number]

## 2017-10-03 NOTE — ED NOTES
Patient has fever, chills with onset a few days ago. H/o renal transplant 13 years ago. Was seen in clinic and told to come to ED for eval.

## 2017-10-03 NOTE — SIGNIFICANT EVENT
Dr. Silva paged re: Blood culture collected from right arm on 10/2 now growing gram negative rods.  Pt's primary nurse, sonia Islas.

## 2017-10-03 NOTE — PLAN OF CARE
Problem: Patient Care Overview  Goal: Plan of Care/Patient Progress Review  Outcome: No Change  Pt alert and oriented. Pt up SBA. BC + with ecoli. Per Dr. Silva, pt should stay on current abx unless UC comes back with e coli, then switch to a -pennem. Pt up SBA, multiple loose stools this shift. Voiding adequately. Procalcitonin 10.69, platelets 39, C Diff and Flu \negative. /hour. On zosyn.

## 2017-10-03 NOTE — SIGNIFICANT EVENT
Dr. Silva paged re:  Blood culture collected from right hand on 10/2 (second site) now growing gram negative rods.  Pt's primary nurse, sonia Islas.

## 2017-10-03 NOTE — PHARMACY-VANCOMYCIN DOSING SERVICE
Pharmacy Vancomycin Initial Note  Date of Service October 3, 2017  Patient's  1950  67 year old, male    Indication: Sepsis    Current estimated CrCl = Estimated Creatinine Clearance: 38.8 mL/min (based on Cr of 1.72).    Creatinine for last 3 days  10/2/2017:  9:23 PM Creatinine 1.72 mg/dL    Recent Vancomycin Level(s) for last 3 days  No results found for requested labs within last 72 hours.      Vancomycin IV Administrations (past 72 hours)                   vancomycin (VANCOCIN) 1,750 mg in NaCl 0.9 % 500 mL intermittent infusion (mg) 1,750 mg New Bag 10/03/17 0108                Nephrotoxins and other renal medications (Future)    Start     Dose/Rate Route Frequency Ordered Stop    10/04/17 0100  vancomycin (VANCOCIN) 1,750 mg in NaCl 0.9 % 500 mL intermittent infusion      1,750 mg  over 2 Hours Intravenous EVERY 24 HOURS 10/03/17 0338      10/03/17 0900  cycloSPORINE modified (GENGRAF BRAND) capsule 50 mg      50 mg Oral EVERY MORNING 10/03/17 0049      10/03/17 0400  piperacillin-tazobactam (ZOSYN) infusion 3.375 g     Comments:  Pharmacy can adjust dose based on renal function.    3.375 g  100 mL/hr over 30 Minutes Intravenous EVERY 6 HOURS 10/03/17 0049      10/03/17 0049  cycloSPORINE modified (GENGRAF BRAND) capsule 25 mg      25 mg Oral EVERY EVENING 10/03/17 0049            Contrast Orders - past 72 hours     None                Plan:  1.  Start vancomycin  1750 mg IV q24h.   2.  Goal Trough Level: 15-20 mg/L   3.  Pharmacy will check trough levels as appropriate in 1-3 Days.    4. Serum creatinine levels will be ordered daily for the first week of therapy and at least twice weekly for subsequent weeks.    5. [place] method utilized to dose vancomycin therapy: Method 2    Tashi Guardado, [facility]

## 2017-10-03 NOTE — TELEPHONE ENCOUNTER
Patient mentioned concerns yesterday at visit with cost of care. Informed I would refer to care coordination for further evaluation and hopeful resources.     -Sha Obrien, PAC

## 2017-10-03 NOTE — PHARMACY-ADMISSION MEDICATION HISTORY
Admission medication history interview status for this patient is complete. See [facility] admission navigator for allergy information, prior to admission medications and immunization status.     Medication history interview source(s):Patient  Medication history resources (including written lists, pill bottles, clinic record):None    Changes made to PTA medication list:  Added: none  Deleted: none  Changed: cyclosporin    Actions taken by pharmacist (provider contacted, etc):None     Additional medication history information:None    Medication reconciliation/reorder completed by provider prior to medication history? No    For patients on insulin therapy: no (Yes/No)   Lantus/levemir/NPH/Mix 70/30 dose: ___ in AM/PM or twice daily   Sliding scale Novolog Y/N   If Yes, do you have a baseline novolog pre-meal dose: ______units with meals   Patients eat three meals a day: Y/N ---  How many episodes of hypoglycemia (low blood glucose) do you have weekly: ---   How many missed doses do you have a week: ---  How many times do you check your blood glucose per day: ---  Any Barriers to therapy: cost of medications/comfortable with giving injections (if applicable)/ comfortable and confident with current diabetes regimen ---      Prior to Admission medications    Medication Sig Last Dose Taking? Auth Provider   cycloSPORINE modified (GENERIC EQUIVALENT) 25 MG capsule Take 25 mg by mouth every evening 10/2/2017 at 1700 Yes Unknown, Entered By History   predniSONE (DELTASONE) 5 MG tablet Take 5 mg by mouth daily 10/2/2017 at am Yes Unknown, Entered By History   aspirin 81 MG EC tablet Take 81 mg by mouth daily 10/2/2017 at am Yes Unknown, Entered By History   diltiazem (DILTIAZEM CD) 120 MG 24 hr capsule Take 120 mg by mouth daily 10/2/2017 at am Yes Unknown, Entered By History   cycloSPORINE modified (GENERIC EQUIVALENT) 25 MG capsule Take 50 mg by mouth every morning  10/2/2017 at am Yes Unknown, Entered By History   metoprolol  (LOPRESSOR) 25 MG tablet Take 12.5 mg by mouth 2 times daily  10/2/2017 at 2nd-1700 Yes Reported, Patient   mycophenolate (CELLCEPT) 500 MG tablet Take 1,000 mg by mouth 2 times daily  10/2/2017 at 2nd-1700 Yes Reported, Patient

## 2017-10-03 NOTE — H&P
[facility]  Hospitalist Admission Note  Name: Junior Patricia    MRN: [id number]  YOB: 1950    Age: 67 year old  Date of admission: 10/2/2017              Brief patient summary: Pt is a 67 yr old male with h/o renal transplant on immunosuppressants , HTN, Hep B cirrhosis who presented on 10/3/2017 with fevers, chills, abd distention and was found to have sepsis of unknown source           Assessment and Plan:   Sepsis (fever 103, WBC 12K, lactic acidosis) : unknown source . CXr -ve, UA bland. He does c/o abd distention and CT shows moderate ascites increased from previous-no other acute pathology.  --ID consult  --will cont braod spectrum abx due to immunocompromised state , add procalcitonin  --IVF overnight  --f/u blood cx, check influenza A/B, check C diff  --will obtain US guided paracentesis and check for SBP (low suspicion)    Loose stools: no e/o colitis on CT however the imaging was without contrast.   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 Continue these as well as chronic prednisone 5 mg daily and diltiazem. Monitor Crt closely, avoid nephrotoxic agents  --nephrology consult for management of immunosuppressants in the setting of infection    Cirrhosis - due to Hepatitis B. Untreated. Pt now has ascites- he still does not want to see a hepatologist    DM - HgbA1c 7.0 likely due to chronic prednisone. Not currently on meds, monitor glucose    DVT Prophylaxis: Pneumatic Compression Devices  Discharge Dispo: home  Estimated Disch Date / # of Days until Disch: 2-3 days   Full Code    Chief Complaint: fevers         History of Present Illness:   Discussed with ER physician : Dr. Smith     Junior Patricia is a 67 year old male who presents with fevers. Pt states him and his wife went to the park on Saturday then upon returning he developed rigors. He did not check his  temp but felt warm then developed sweats. In the last 2 days he also noticed abd distention which is causing him to have difficulty breathing. He also noticed LE edema. No cough. Some loose stools.            Past Medical History:     Patient Active Problem List   Diagnosis     Kidney replaced by transplant     Obesity     Gout     HYPERLIPIDEMIA LDL GOAL <100     CKD (chronic kidney disease) stage 3, GFR 30-59 ml/min     Colon polyps     Hepatitis B infection     Type 2 diabetes mellitus with hemoglobin A1c goal of less than 8.0% (H)     Macrocytosis without anemia     Asthma exacerbation     Colitis     Sepsis (H)      Past Medical History:   Diagnosis Date     CKD (chronic kidney disease) stage 3, GFR 30-59 ml/min 4/28/2011     Colon polyps 5/24/2011     DM type 2, goal A1C below 8.0 8/4/2014     Elevated LFT's 5/11/2011     GOUT NOS 9/20/2007     Hyperlipidemia LDL goal <100 10/31/2010     Kidney replaced by transplant     done at [facility]     Macrocytosis without anemia 2/26/2016     Nephrolithiasis      Obesity, unspecified                 Past Surgical History:     Past Surgical History:   Procedure Laterality Date     KNEE SURGERY       TRANSPLANT KIDNEY RECIPIENT LIVING RELATED      uric acid nephropathy               Home Medications:     Prior to Admission medications    Medication Sig Last Dose Taking? Auth Provider   cycloSPORINE modified (GENERIC EQUIVALENT) 25 MG capsule Take 25 mg by mouth every evening 10/2/2017 at 1700 Yes Unknown, Entered By History   predniSONE (DELTASONE) 5 MG tablet Take 5 mg by mouth daily 10/2/2017 at am Yes Unknown, Entered By History   aspirin 81 MG EC tablet Take 81 mg by mouth daily 10/2/2017 at am Yes Unknown, Entered By History   diltiazem (DILTIAZEM CD) 120 MG 24 hr capsule Take 120 mg by mouth daily 10/2/2017 at am Yes Unknown, Entered By History   cycloSPORINE modified (GENERIC EQUIVALENT) 25 MG capsule Take 50 mg by mouth every morning  10/2/2017 at am Yes  Unknown, Entered By History   metoprolol (LOPRESSOR) 25 MG tablet Take 12.5 mg by mouth 2 times daily  10/2/2017 at 2nd-1700 Yes Reported, Patient   mycophenolate (CELLCEPT) 500 MG tablet Take 1,000 mg by mouth 2 times daily  10/2/2017 at 2nd-1700 Yes Reported, Patient            Allergies:     Allergies   Allergen Reactions     Lisinopril Swelling     No Known Drug Allergies      Simvastatin Itching            Social History:     Social History     Social History     Marital status:      Spouse name: N/A     Number of children: N/A     Years of education: N/A     Occupational History     Not on file.     Social History Main Topics     Smoking status: Never Smoker     Smokeless tobacco: Never Used     Alcohol use No     Drug use: No     Sexual activity: Yes     Partners: Female     Other Topics Concern     Not on file     Social History Narrative                  Family History:   reviewed and noncontributory             Review of Systems:   The 10 point Review of Systems is negative other than noted in the HPI.          Physical Exam:     Heart Rate: 76, Blood pressure 119/70, temperature 103  F (39.4  C), temperature source Oral, resp. rate 20, SpO2 95 %.  0 lbs 0 oz     General: Alert, awake, no acute distress. Appears slightly jaundiced   HEENT: NC/AT, eyes icteric, external occular movements intact, face symmetric.  Dentition WNL, MM moist.  Cardiac: RRR, S1, S2.  No murmurs appreciated.  Pulmonary: Normal chest rise, normal work of breathing.  Lungs CTA BL  Abdomen: soft,very distended, non tender  Bowel Sounds Present.  No guarding.  Extremities: no deformities.  Warm, well perfused.  Skin: no rashes or lesions noted.  Warm and Dry.  Neuro: No focal deficits noted.  Speech clear.  Coordination and strength grossly normal.  Psych: Appropriate affect.         Data:   All new lab and imaging data was reviewed.    Recent Labs  Lab 10/02/17  2123 10/02/17  1146 09/29/17  0800   WBC 12.4* 12.5* 3.7*   HGB  11.0* 11.6* 11.1*   HCT 32.6* 35.3* 33.6*    101* 101*   PLT 53* 53* 56*       Recent Labs  Lab 10/02/17  2123 09/29/17  0800   * 140   POTASSIUM 4.0 3.9   CHLORIDE 103 110*   CO2 18* 23   ANIONGAP 10 7   * 92   BUN 29 21   CR 1.72* 1.26*   GFRESTIMATED 40* 57*   GFRESTBLACK 48* 69   AMARILYS 7.8* 8.5          Imaging:  Results for orders placed or performed during the hospital encounter of 10/02/17   XR Chest 2 Views    Narrative    CHEST TWO VIEWS 10/2/2017 10:25 PM     HISTORY: Fever.    COMPARISON: 10/2/2017.    FINDINGS: There are no acute infiltrates. The cardiac silhouette is  not enlarged. Pulmonary vasculature is unremarkable.      Impression    IMPRESSION: No acute disease.    MYRIAM KELLY MD   CT Abdomen Pelvis w/o Contrast    Narrative    CT ABDOMEN PELVIS W/O CONTRAST  10/3/2017 12:35 AM     INDICATION: Abdominal distention. History of renal transplant. Fever  and chills.    TECHNIQUE: Thin axial images through the abdomen and pelvis without  contrast. Coronal reformatted images. Radiation dose for this scan was  reduced using automated exposure control, adjustment of the mA and/or  kV according to patient size, or iterative reconstruction technique.    COMPARISON: 7/26/2017. Ultrasound dated 7/26/2017.    FINDINGS: Again seen is some irregularity of the liver surface  suggesting cirrhosis. Mildly enlarged spleen measuring 14.6 cm  craniocaudad. Moderate ascites has slightly increased. Probable tiny  gallstones. Gallbladder distention is slightly less prominent than on  previous exam. Pancreas, adrenal glands and kidneys are stable. The  native kidneys are severely atrophic. There is a large stone or  calcified cast in the proximal left ureter which is stable measuring  0.9 cm in cross-sectional diameter x 2.5 cm in length.    Left lower quadrant renal transplant. No focal fluid collection  surrounding the left renal transplant. Extensive vascular  calcification. Moderate-sized  periumbilical hernia containing fat and  a small amount of fluid. No loculated fluid collections or abscess. No  bowel obstruction, free air or other acute findings.      Impression    IMPRESSION:  1. Moderate ascites slightly increased.  2. Cirrhotic liver configuration and mild splenomegaly.  3. Cholelithiasis.  4. Markedly atrophic native kidneys with left lower quadrant renal  transplant.    MD Kina RAMIREZ MD  Hospitalist  [facility] [address] [place] [address] [phone]

## 2017-10-03 NOTE — ED PROVIDER NOTES
History     Chief Complaint:  Fever    HPI   Junior Patricia is a 67 year old male with a history of renal transplant (2003 at [facility] ) who presents with fever/chills. The patient states he has had these symptoms for the last few days, and went to the clinic for evaluation of this today. They referred him here for further evaluation. Upon presentation, he also some endorses having a couple of loose stools and general myalgias. The patient denies abdominal pain, vomiting, cough, headache, rashes, problems with the transplant, and states no other concerns at this time. He took tylenol 4 hours prior to arrival.     Allergies:  Lisinopril  Simvastatin      Medications:    predniSONE (DELTASONE) 5 MG tablet  aspirin 81 MG EC tablet  diltiazem (DILTIAZEM CD) 120 MG 24 hr capsule  cycloSPORINE modified (GENERIC EQUIVALENT) 25 MG capsule  metoprolol (LOPRESSOR) 25 MG tablet  mycophenolate (CELLCEPT) 500 MG tablet    Past Medical History:    CKD (chronic kidney disease) stage 3, GFR 30-59 ml/min   Colon polyps   DM type 2, goal A1C below 8.0   Elevated LFT's   GOUT NOS   Hyperlipidemia LDL goal <100   Kidney replaced by transplant   Macrocytosis without anemia   Nephrolithiasis   Obesity, unspecified    Past Surgical History:    Knee surgery   Transplant Kidney    Family History:    History reviewed. No pertinent family history.     Social History:  Marital Status:     Smoking status: Never smoker  Alcohol use: No    Review of Systems   Constitutional: Positive for chills and fever.   Respiratory: Negative for cough.    Gastrointestinal: Positive for diarrhea. Negative for abdominal pain, constipation, nausea and vomiting.   Musculoskeletal: Positive for myalgias.   All other systems reviewed and are negative.    Physical Exam     Patient Vitals for the past 24 hrs:   BP Temp Temp src Heart Rate Resp SpO2   10/02/17 2350 119/68 - - 79 - 99 %   10/02/17 2335 121/68 - - 80 - -   10/02/17 2248 - 103  F (39.4  C) Oral - - -    10/02/17 2232 - - - 79 - 99 %   10/02/17 2151 - - - 85 - 100 %   10/02/17 2150 123/68 - - - - -   10/02/17 2137 - - - 90 - 99 %   10/02/17 2115 114/90 - - - - -   10/02/17 2104 125/78 102.9  F (39.4  C) Oral 100 20 99 %        Physical Exam  Vital signs and nursing notes reviewed.     Constitutional: laying on gurney appears comfortable  HENT: Oropharynx is clear and mildly dry  Eyes: Conjunctivae are normal bilaterally. Pupils equal  Neck: normal range of motion, no meningeal signs  Cardiovascular: Normal rate, regular rhythm, normal heart sounds.   Pulmonary/Chest: Effort normal and breath sounds normal. No respiratory distress.   Abdominal: Soft. Bowel sounds are normal. No localized tenderness to palpation. No rebound or guarding or peritoneal signs.  Somewhat protuberant abdomen.  Musculoskeletal: No joint swelling or mild bilateral lower extremity edema.    Neurological: Alert and oriented. No focal weakness  Skin: Skin is warm and dry. No rash noted. No noted cellulitis  Psych: normal affect     Emergency Department Course     ECG:  @ 2124  Indication: Fever  Vent. Rate 94 bpm. VT interval 160 ms. QRS duration 84 ms. QT/QTc 340/425 ms. P-R-T axis -2 38 57.   Sinus rhythm with premature atrial complexes, low voltage QRS, borderline ECG.   Read @ 2127 by Dr. Lorenzo.     Imaging:  Radiology findings were communicated with the patient who voiced understanding of the findings.  XR Chest 2 Views   Final Result   IMPRESSION: No acute disease.      MYRIAM KELLY MD         Laboratory:  Laboratory findings were communicated with the patient who voiced understanding of the findings.  Blood cultures: Pending   (2128) ISTAT gases lactate venous POCT: lactic acid 2.9 (H), pH 7.42, pCO2 24 (L), pO2 40, bicarbonate 16 (L), O2 saturation 77  CMP: Glucose 169 (H), Na 131 (L), CO2 18 (L), Creatinine 1.72 (H), GFR 40 (L), calcium 7.8 (L), bilirubin 1.9 (H), albumin 2.7 (L), protein total 6.1 (L), AST 47 (H), o/w WNL   CBC:  WBC 12.4 (H), HGB 11.0 (L), PLT 53(L), o/w WNL.   Lactic Acid: 2.1 (H)    Interventions:  2134: NS Bolus 1,000mL IV   2134: Tylenol, 500 mg, PO   2248: NS Bolus 1,000mL IV   Zosyn 3.375 g infusion started  Vancomycin - Pharmacy to dose    Emergency Department Course:  Nursing notes and vitals reviewed.  I performed an exam of the patient as documented above.   The patient was sent for a chest x-ray while in the emergency department, results above.   IV was inserted and blood was drawn for laboratory testing, results above.  2330: I spoke with Dr. Fischer of the hospitalist service regarding patient's presentation, findings, and plan of care.  I discussed the treatment plan with the patient. They expressed understanding of this plan and consented to admission. I discussed the patient with Dr. Fischer, who will admit the patient to a monitored bed for further evaluation and treatment.   I personally reviewed the laboratory and imaging results with the Patient and caregiver and answered all related questions prior to admission.      Impression & Plan      Medical Decision Making:   Junior Patricia is a 67 year old male who presents with a fever. The patient has reportedly had 48 hours of intermittent fevers and chills. Patient developed rigors this afternoon. Patient was advised to come here after seeing his primary care doctor earlier today, but he wanted to go home first. Patient has no real symptoms to suggest source of fever such as cough, abdominal pain, vomiting, unusual rash, or urinary symptoms. His urinalysis from the clinic earlier today does not show any evidence of urinary tract infection. Blood and urine cultures were obtained. Initial lactate was 2.9. Patient meets criteria for severe sepsis. We will start him on Zosyn and vancomycin. Patient has had no hypotension or tachycardia in the emergency department.  I discussed the case with Dr. Fischer who agrees to accept the patient. Patient understands the plan  and agrees to admission.     CMS Diagnoses:   The patient has signs of Severe Sepsis as evidenced by:    1. 2 SIRS criteria, AND  2. Suspected infection, AND   3. Organ dysfunction: Lactic Acid >2    Time severe sepsis diagnosis confirmed = 2141 as this was the time when Lactate resulted, and the level was >2      3 Hour Severe Sepsis Bundle Completion:  1. Initial Lactic Acid Result:   Recent Labs   Lab Test  10/02/17   2326  10/02/17   2128  07/26/17   0620   LACT  2.1*  2.9*  2.1     2. Blood Cultures before Antibiotics: Yes  3. Broad Spectrum Antibiotics Administered: Yes     Anti-infectives (Future)    Start     Dose/Rate Route Frequency Ordered Stop    10/02/17 2328  piperacillin-tazobactam (ZOSYN) infusion 3.375 g      3.375 g  100 mL/hr over 30 Minutes Intravenous ONCE 10/02/17 2327          4. NS 2L, no indication for 30 cc/kg bolus    The patient was transferred out of the ED prior to the 6 hour rubin.  Repeat lactate improved to 2.1 and vital signs remain stable without evidence of hypoperfusion.     Diagnosis:    ICD-10-CM    1. Severe sepsis (H) A41.9     R65.20       Disposition:   Admitted to adult med/surg bed under the supervision of Dr. Amado Flowersibmatthew Disclosure:  I, Tank Stratton, am serving as a scribe at 9:23 PM on 10/2/2017 to document services personally performed by Jeb Lorenzo MD, based on my observations and the provider's statements to me.   [facility] EMERGENCY DEPARTMENT       Jeb Lorenzo MD  10/03/17 1018

## 2017-10-03 NOTE — CONSULTS
ID consult dictated IMP 1 68 yo male acute sepsis, GNR in BC, source not clear on CT, UA neg    REC 1 DC vanco, continue zosyn, await full info

## 2017-10-03 NOTE — PROGRESS NOTES
Paracentesis completed for 1750 cc clear dark latonya fluid, patient tolerated well. Fluid to lab for testing as ordered. Transferred to room via cart in stable condition.

## 2017-10-03 NOTE — PROGRESS NOTES
[facility]    Hospitalist Progress Note  Name: Junior Patricia    MRN: [id number]  Provider: Virginie Silva MD  Date of Service: 10/03/2017    Assessment & Plan   Summary of Stay: Junior Patricia is a 67 year old male who was admitted on 10/2/2017 with sepsis. Pt is a 67 yr old male with h/o renal transplant on immunosuppressants , HTN, Hep B cirrhosis who presented on 10/3/2017 with fevers, chills, abd distention and was found to have sepsis of unknown source         Sepsis (fever 103, WBC 12K, lactic acidosis) : unknown source  -- CXr -ve, UA bland. He does c/o abd distention and CT shows moderate ascites increased from previous-no other acute pathology.  --ID consult appreciated  --will cont Zosyn as its gram neg. DC VAnco per ID   -- procalc > 40  --IVF overnight  --f/u blood cx with E coli sepsis  --Negative influenza A/B,  C diff negative  --will obtain US guided paracentesis and check for SBP (low suspicion) pending pt has low platelets     Loose stools: no e/o colitis on CT however the imaging was without contrast.   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 PENDING  --continue chronic prednisone 5 mg daily and Cyclosporin  --Monitor Crt closely,   --avoid nephrotoxic agents  --nephrology consult for management of immunosuppressants in the setting of infection  --d/w Dr. Marquez from transplant clinic at [facility] where patient is followed. 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 Untreated.   --Pt now has ascites- he still does not want to see a hepatologist     DM - HgbA1c 7.0 likely due to chronic prednisone.   --Not currently on meds, monitor glucose  -- sliding scale    THrombocytopenia  -- chronic now lower   -- will Hold ASA     DVT  Prophylaxis: Pneumatic Compression Devices  Code Status: Full Code    Disposition: Expected discharge TBD critically sick       Interval History   Assumed Care, reviewed chart. Patient sleepy and tired was up all night. C/o malaise and weakness    -Data reviewed today: I reviewed all new labs and imaging reports over the last 24 hours. I personally reviewed no images or EKG's today.    Physical Exam   Temp: 99.9  F (37.7  C) Temp src: Oral BP: 121/68   Heart Rate: 74 Resp: 20 SpO2: 99 % O2 Device: None (Room air)    Vitals:    10/03/17 0050   Weight: 87.5 kg (192 lb 12.8 oz)     Vital Signs with Ranges  Temp:  [99.9  F (37.7  C)-103.2  F (39.6  C)] 99.9  F (37.7  C)  Pulse:  [94] 94  Heart Rate:  [] 74  Resp:  [20-24] 20  BP: (114-144)/(68-90) 121/68  SpO2:  [95 %-100 %] 99 %  I/O last 3 completed shifts:  In: 839 [P.O.:30; I.V.:809]  Out: 300 [Urine:300]      General: Alert, awake, no acute distress. Appears slightly jaundiced   HEENT: NC/AT, eyes icteric, external occular movements intact, face symmetric.  Dentition WNL, MM moist.  Cardiac: RRR, S1, S2.  No murmurs appreciated.  Pulmonary: Normal chest rise, normal work of breathing.  Lungs CTA BL  Abdomen: soft,very distended, non tender  Bowel Sounds Present.  No guarding.  Extremities: no deformities.  Warm, well perfused.  Skin: no rashes or lesions noted.  Warm and Dry.  Neuro: No focal deficits noted.  Speech clear.  Coordination and strength grossly normal.  Psych: Appropriate affect.    Medications     NaCl 100 mL/hr at 10/03/17 0559     NaCl Stopped (10/03/17 0030)       aspirin  81 mg Oral Daily     cycloSPORINE modified  50 mg Oral QAM     cycloSPORINE modified  25 mg Oral QPM     diltiazem  120 mg Oral Daily     metoprolol  12.5 mg Oral BID     predniSONE  5 mg Oral Daily     piperacillin-tazobactam  3.375 g Intravenous Q6H     [START ON 10/4/2017] influenza Vac Split High-Dose  0.5 mL Intramuscular Prior to discharge     pantoprazole  40 mg Oral  QAM     [START ON 10/4/2017] vancomycin (VANCOCIN) IV  1,750 mg Intravenous Q24H     Data       Recent Labs  Lab 10/03/17  0720 10/02/17  2123 10/02/17  1146   WBC 8.1 12.4* 12.5*   HGB 9.9* 11.0* 11.6*   HCT 30.1* 32.6* 35.3*   * 100 101*   PLT 39* 53* 53*       Recent Labs  Lab 10/03/17  0720 10/02/17  2123 09/29/17  0800    131* 140   POTASSIUM 3.8 4.0 3.9   CHLORIDE 108 103 110*   CO2 19* 18* 23   ANIONGAP 6 10 7   * 169* 92   BUN 27 29 21   CR 1.66* 1.72* 1.26*   GFRESTIMATED 42* 40* 57*   GFRESTBLACK 50* 48* 69   AMARILYS 7.4* 7.8* 8.5       Recent Labs  Lab 10/03/17  0100 10/02/17  2205 10/02/17  2123   CULT PENDING No growth after 5 hours No growth after 5 hours       Recent Labs  Lab 10/03/17  0720 10/02/17  2123   AST 47* 47*   ALT 56 65   ALKPHOS 103 138   BILITOTAL 1.8* 1.9*       Recent Labs  Lab 10/03/17  0720   INR 1.76*       Recent Labs  Lab 10/03/17  0836 10/02/17  2326 10/02/17  2128   LACT 1.4 2.1* 2.9*       Recent Labs  Lab 10/02/17  2123   LIPASE 182     No results for input(s): TSH in the last 168 hours.  No results for input(s): TROPONIN, TROPI, TROPR in the last 168 hours.    Invalid input(s): TROP, TROPONINIES    Recent Labs  Lab 10/03/17  0100 10/02/17  1147   COLOR Danitza Yellow   APPEARANCE Clear Clear   URINEGLC Negative Negative   URINEBILI Negative Small*   URINEKETONE Negative Negative   SG 1.030 1.020   UBLD Negative Small*   URINEPH 5.0 6.5   PROTEIN 30* 100*   UROBILINOGEN  --  0.2   NITRITE Negative Negative   LEUKEST Negative Negative   RBCU 0 O - 2   WBCU 2 O - 2       Recent Results (from the past 24 hour(s))   XR Chest 2 Views    Narrative    CHEST TWO VIEWS  10/2/2017 12:05 PM     HISTORY: 67-year-old patient with history of fever.       Impression    IMPRESSION: Since April 5, 2017, heart size remains normal. No pleural  effusion, pneumothorax, or abnormal area of consolidation.    ENRIQUE ANG MD   XR Chest 2 Views    Narrative    CHEST TWO VIEWS  10/2/2017 10:25 PM     HISTORY: Fever.    COMPARISON: 10/2/2017.    FINDINGS: There are no acute infiltrates. The cardiac silhouette is  not enlarged. Pulmonary vasculature is unremarkable.      Impression    IMPRESSION: No acute disease.    MYRIAM KELLY MD   CT Abdomen Pelvis w/o Contrast    Narrative    CT ABDOMEN PELVIS W/O CONTRAST  10/3/2017 12:35 AM     INDICATION: Abdominal distention. History of renal transplant. Fever  and chills.    TECHNIQUE: Thin axial images through the abdomen and pelvis without  contrast. Coronal reformatted images. Radiation dose for this scan was  reduced using automated exposure control, adjustment of the mA and/or  kV according to patient size, or iterative reconstruction technique.    COMPARISON: 7/26/2017. Ultrasound dated 7/26/2017.    FINDINGS: Again seen is some irregularity of the liver surface  suggesting cirrhosis. Mildly enlarged spleen measuring 14.6 cm  craniocaudad. Moderate ascites has slightly increased. Probable tiny  gallstones. Gallbladder distention is slightly less prominent than on  previous exam. Pancreas, adrenal glands and kidneys are stable. The  native kidneys are severely atrophic. There is a large stone or  calcified cast in the proximal left ureter which is stable measuring  0.9 cm in cross-sectional diameter x 2.5 cm in length.    Left lower quadrant renal transplant. No focal fluid collection  surrounding the left renal transplant. Extensive vascular  calcification. Moderate-sized periumbilical hernia containing fat and  a small amount of fluid. No loculated fluid collections or abscess. No  bowel obstruction, free air or other acute findings.      Impression    IMPRESSION:  1. Moderate ascites slightly increased.  2. Cirrhotic liver configuration and mild splenomegaly.  3. Cholelithiasis.  4. Markedly atrophic native kidneys with left lower quadrant renal  transplant.    DEL VANCE MD

## 2017-10-03 NOTE — PROGRESS NOTES
Pt is not in his room.  Chart reviewed.  Full consult trmw.    Cr stable at 1.7.  WBC, hb, plts decreasing.    1.  Agree c holding MMF, pred for now.  2.  Continue CSA.  3.  Daily labs (BMP, CBC).

## 2017-10-03 NOTE — ED NOTES
[facility]  ED Nurse Handoff Report    Junior Patricia is a 67 year old male   ED Chief complaint: Fever  . ED Diagnosis:   Final diagnoses:   Severe sepsis (H)     Allergies:   Allergies   Allergen Reactions     Lisinopril Swelling     No Known Drug Allergies      Simvastatin Itching       Code Status: Full Code  Activity level - Baseline/Home:  Stand with Assist. Activity Level - Current:   Stand with Assist. Lift room needed: No. Bariatric: No   Needed: No   Isolation: No. Infection: Not Applicable.     Vital Signs:   Vitals:    10/02/17 2150 10/02/17 2151 10/02/17 2232 10/02/17 2248   BP: 123/68      Resp:       Temp:    103  F (39.4  C)   TempSrc:    Oral   SpO2:  100% 99%      No  Cardiac Rhythm:  ,      Pain level:    Patient confused: . Patient Falls Risk: No.   Elimination Status: Has not voided yet. Dr Lorenzo doesn't want a cath done. 2nd bolus running   Patient Report - Initial Complaint: Fever. Focused Assessment: Pt with a fever for 3 days and cough today. Pt seen at clinic, sent to ED since he is immunocompromised.    Tests Performed: Labs Xray. Abnormal Results: ..  XR Chest 2 Views   Final Result   IMPRESSION: No acute disease.      MYRIAM KELLY MD        ..  Labs Ordered and Resulted from Time of ED Arrival Up to the Time of Departure from the ED   COMPREHENSIVE METABOLIC PANEL - Abnormal; Notable for the following:        Result Value    Sodium 131 (*)     Carbon Dioxide 18 (*)     Glucose 169 (*)     Creatinine 1.72 (*)     GFR Estimate 40 (*)     GFR Estimate If Black 48 (*)     Calcium 7.8 (*)     Bilirubin Total 1.9 (*)     Albumin 2.7 (*)     Protein Total 6.1 (*)     AST 47 (*)     All other components within normal limits   CBC WITH PLATELETS DIFFERENTIAL - Abnormal; Notable for the following:     WBC 12.4 (*)     RBC Count 3.26 (*)     Hemoglobin 11.0 (*)     Hematocrit 32.6 (*)     MCH 33.7 (*)     Platelet Count 53 (*)     Absolute Neutrophil 11.1 (*)      Absolute Lymphocytes 0.6 (*)     All other components within normal limits   ISTAT  GASES LACTATE ROGERS POCT - Abnormal; Notable for the following:     PCO2 Venous 24 (*)     Bicarbonate Venous 16 (*)     Lactic Acid 2.9 (*)     All other components within normal limits   ROUTINE UA WITH MICROSCOPIC   LACTIC ACID WHOLE BLOOD   MEASURE URINE OUTPUT   PERIPHERAL IV CATHETER   PULSE OXIMETRY NURSING   CARDIAC CONTINUOUS MONITORING   ISTAT CG4 GASES LACTATE ROGERS NURSING POCT   PATIENT CARE ORDER   BLOOD CULTURE   BLOOD CULTURE   URINE CULTURE AEROBIC BACTERIAL     Repeat lactate in for 2330  .   Treatments provided: NSS bolus x2. 500 mg Tylenol  Family Comments: Spouse at bedside.   OBS brochure/video discussed/provided to patient:  N/A  ED Medications:   Medications   0.9% sodium chloride BOLUS (0 mLs Intravenous Stopped 10/2/17 2230)     Followed by   0.9% sodium chloride infusion (1,000 mLs Intravenous New Bag 10/2/17 2248)   acetaminophen (TYLENOL) tablet 500 mg (500 mg Oral Given 10/2/17 2134)     Drips infusing:  No  For the majority of the shift, the patient's behavior Green. Interventions performed were NA.     Severe Sepsis OR Septic Shock Diagnosis Present:   Yes    Per the ED Provider, Time Zero for severe sepsis or septic shock is:  2124    3 Hour Severe Sepsis Bundle Completion:  1. Initial Lactic Acid Result:   Recent Labs   Lab Test  10/02/17   2128  07/26/17   0620  07/26/17   0455   LACT  2.9*  2.1  2.4*     2. Blood Cultures before Antibiotics: Yes  3. Broad Spectrum Antibiotics Administered:     Anti-infectives (Future)    Start     Dose/Rate Route Frequency Ordered Stop    10/02/17 2328  piperacillin-tazobactam (ZOSYN) infusion 3.375 g      3.375 g  100 mL/hr over 30 Minutes Intravenous ONCE 10/02/17 2327          4. 2000 ml of IV fluids have been given so far      6 Hour Severe Sepsis Bundle Completion:    1. Repeat Lactic Acid Level :2.1  2. Patient currently on Vasopressors =  No        ED Nurse  Name/Phone Number: Gloria Mosqueda,   11:12 PM  RECEIVING UNIT ED HANDOFF REVIEW    Above ED Nurse Handoff Report was reviewed: yes  Reviewed by: Jacklyn Calles on October 3, 2017 at 12:07 AM

## 2017-10-04 NOTE — PLAN OF CARE
Problem: Patient Care Overview  Goal: Plan of Care/Patient Progress Review  Outcome: No Change  Pt up indep  TMAX:100.  vss  Denies pain  LS clear.  RA  +BS.  Distended abd.  Denies n/v.  Reg diet  Voiding  TX:  Zosyn  B  Disp:  TBD

## 2017-10-04 NOTE — CONSULTS
[facility]    RENAL CONSULTATION NOTE    REFERRING MD:  Dr. Fischer    REASON FOR CONSULTATION:  NANCY/CKD, tx pt    DATE OF CONSULTATION: 10/04/17    SHORTHAND KEY FOR MY NOTES:  c = with, s = without, p = after, a = before, x = except, asx = asymptomatic, tx = transplant or treatment, sx = symptoms or symptomatic, cx = canceled or culture, rxn = reaction, yday = yesterday, nl = normal, abx = antibiotics, fxn = function, dx = diagnosis, dz = disease, m/h = melena/hematochezia, c/d/l/ha = cramping/dizziness/lightheadedness/headache, d/c = discharge or diarrhea/constipation, f/c/n/v = fevers/chills/nausea/vomiting, cp/sob = chest pain/shortness of breath.    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    Pt was feeling poorly for a few days p going on a trip.  He had f/c and diarrhea.  In addition, he had increased abd distention that caused sob.  He did not have any cp.  He presented to the ER for further eval and was noted to have a high fever.  His BP was ok.  His labs showed an elevated cr of 1.7, a high wbc of 12.4 and an elevated lactate.  He was given 2L IVF, aceta and vanco/zosyn.  Yday, he also had a paracentesis of 1.5L.    Today, pt feels much better.  He denies any f/c/n/v/abd pain.  He is urinating ok and denies any d/h/abd pain.  He would like to go home soon.    ROS:  A complete review of systems was performed and is x as noted above.    PMH:    Past Medical History:   Diagnosis Date     CKD (chronic kidney disease) stage 3, GFR 30-59 ml/min 4/28/2011     Colon polyps 5/24/2011     DM type 2, goal A1C below 8.0 8/4/2014     Elevated LFT's 5/11/2011     GOUT NOS 9/20/2007     Hyperlipidemia LDL goal <100 10/31/2010     Kidney replaced by transplant     done at [facility]     Macrocytosis without anemia 2/26/2016     Nephrolithiasis      Obesity,  unspecified      PSH:    Past Surgical History:   Procedure Laterality Date     KNEE SURGERY       TRANSPLANT KIDNEY RECIPIENT LIVING RELATED      uric acid nephropathy     MEDICATIONS:      diltiazem  120 mg Oral Daily     metoprolol  12.5 mg Oral BID     predniSONE  5 mg Oral Daily     piperacillin-tazobactam  3.375 g Intravenous Q6H     influenza Vac Split High-Dose  0.5 mL Intramuscular Prior to discharge     pantoprazole  40 mg Oral QAM     cycloSPORINE modified  50 mg Oral QAM     cycloSPORINE modified  25 mg Oral QPM     ALLERGIES:    Allergies as of 10/02/2017 - Case as Reviewed 10/02/2017   Allergen Reaction Noted     Lisinopril Swelling 06/22/2006     No known drug allergies  07/20/2004     Simvastatin Itching 06/24/2011     FH:    No family history on file.  R/NC    SH:    Social History     Social History     Marital status:      Spouse name: N/A     Number of children: N/A     Years of education: N/A     Occupational History     Not on file.     Social History Main Topics     Smoking status: Never Smoker     Smokeless tobacco: Never Used     Alcohol use No     Drug use: No     Sexual activity: Yes     Partners: Female     Other Topics Concern     Not on file     Social History Narrative     PHYSICAL EXAM:    /79 (BP Location: Right arm)  Pulse 80  Temp 99  F (37.2  C) (Oral)  Resp 20  Ht 1.524 m (5')  Wt 87.1 kg (192 lb 1.6 oz)  SpO2 100%  BMI 37.52 kg/m2    GENERAL: awake, alert, NAD  HEENT:  Normocephalic. No gross abnormalities.  MMM.  Dentition is ok.  Pupils equal.  EOMI.  No scleral icterus.  CV: RRR c 1/6 murmurs, no clicks, gallops, or rubs, tr ble edema.  RESP: Clear bilaterally with good efforts  GI: Abdomen o/s/nt/nd, BS present. No masses, organomegaly; tx site ok - non-tender, no warmth/erythema  MUSCULOSKELETAL: extremities nl - no gross deformities noted  SKIN: no suspicious lesions or rashes, dry to touch  NEURO:  Strength normal and symmetric.   PSYCH: mood good,  affect appropriate  LYMPH: No palpable ant/post cervical and supraclavicular adenopathy    LABS:      CBC RESULTS:     Recent Labs  Lab 10/04/17  0755 10/03/17  0720 10/02/17  2123 10/02/17  1146 09/29/17  0800   WBC 4.1 8.1 12.4* 12.5* 3.7*   RBC 3.08* 2.96* 3.26* 3.49* 3.33*   HGB 10.2* 9.9* 11.0* 11.6* 11.1*   HCT 31.9* 30.1* 32.6* 35.3* 33.6*   PLT 29* 39* 53* 53* 56*     BMP RESULTS:    Recent Labs  Lab 10/04/17  0755 10/03/17  0720 10/02/17  2123 09/29/17  0800    133 131* 140   POTASSIUM 3.8 3.8 4.0 3.9   CHLORIDE 109 108 103 110*   CO2 18* 19* 18* 23   BUN 22 27 29 21   CR 1.50* 1.66* 1.72* 1.26*   GLC 93 109* 169* 92   AMARILYS 7.7* 7.4* 7.8* 8.5     INR  Recent Labs  Lab 10/03/17  0720   INR 1.76*      DIAGNOSTICS:  Personally reviewed CXR - clear    A/P:  Junior Patricia is a 67 year old male c ESKD 2 nephrolithiasis s/p LDKT (niece) who has NANCY, sepsis syndrome.    1.  NANCY/ESKD s/p LDKT.  His baseline cr is ~1.3-1.5 it seems and he rebeca to 1.7.  With fluids, he is almost back to baseline.  He is urinating fine.  A.  Follow labs, uo, sx.  B.  Continue CSA, dilt, pred.  C.  Hold MMF for now.    2.  Sepsis syndrome.  Pt's wbc is better today p receiving abx.  BCx are pending.  Clinically better.  A.  Continue abx.  B.  Follow clinically.    3.  HTN.  Controlled c current meds.  A.  Continue same meds/doses.    4.  Ascites.  Pt is better p paracentesis.  A.  Follow clinically.    5.  NAGMA.  This may have been due to the diarrhea + lactic acidosis.  K is on the low side, so no treatment c bicarb for now.  A.  Follow clinically.    6.  FEN.  Electrolytes are ok.  A.  Continue same diet.    Thank you for this consultation. We will follow c you.  Please call if any questions.      Attestation:   I have reviewed today's relevant vital signs, notes, medications, labs and imaging.    Ruben Ochoa MD  [facility] - Nephrology  [phone]

## 2017-10-04 NOTE — PROGRESS NOTES
[facility]  Infectious Disease Progress Note          Assessment and Plan:   IMPRESSION:   1.  A 67-year-old male with complicated medical history admitted with acute sepsis, found to have gram-negative rods in the blood, source not entirely clear, does have some abdominal distention, ascites, gallstones, kidney stones, but none as an obvious source at this point, kleb in cx, ? SBP.   2.  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   3.  Prior renal transplant 14 years ago with no major infection complications.   4.  Mild chronic renal insufficiency post-transplant.   5.  Uric acid nephropathy with kidney stones, no presumed renal functions so will unlikely to be source of infection.   6.  Gallstones, not previously known, no evidence of cholecystitis as cause of the current bacteremia.   7.  Latent tuberculosis, previously treated.       RECOMMENDATIONS:   1.  Continue Zosyn, po quinolone as disposition option  2.  Await full culture on fluid, .   3.  Already has had a CT scan,  abdominal fluidt? peritonitis. 1915 WBc but not all PMNs, cx pending  4.  Not otherwise obvious source seen with initial workup, if clinically improves, simply treat with antibiotics directed against the organism, likely 10 days lev         Interval History:   no new complaints and doing well; no cp, sob, n/v/d, or abd pain. Feels better T down peritoneal fluid noted, BC kleb aminoglyc and sulfa R , quinolone and pip sens              Medications:       diltiazem  120 mg Oral Daily     metoprolol  12.5 mg Oral BID     predniSONE  5 mg Oral Daily     piperacillin-tazobactam  3.375 g Intravenous Q6H     influenza Vac Split High-Dose  0.5 mL Intramuscular Prior to discharge     pantoprazole  40 mg Oral QAM     cycloSPORINE modified  50 mg Oral QAM     cycloSPORINE modified  25 mg Oral QPM                  Physical Exam:   Blood pressure 136/75, pulse 80, temperature 98.5  F (36.9  C), temperature source  Oral, resp. rate 19, height 1.524 m (5'), weight 87.1 kg (192 lb 1.6 oz), SpO2 100 %.  Wt Readings from Last 2 Encounters:   10/04/17 87.1 kg (192 lb 1.6 oz)   10/02/17 85.7 kg (189 lb)     Vital Signs with Ranges  Temp:  [98.5  F (36.9  C)-100  F (37.8  C)] 98.5  F (36.9  C)  Heart Rate:  [66-71] 66  Resp:  [19-21] 19  BP: (124-147)/(75-82) 136/75  SpO2:  [98 %-100 %] 100 %    Constitutional: Awake, alert, cooperative, no apparent distress   Lungs: Clear to auscultation bilaterally, no crackles or wheezing   Cardiovascular: Regular rate and rhythm, normal S1 and S2, and no murmur noted   Abdomen: Normal bowel sounds, soft, mildlydistended, non-tender   Skin: No rashes, no cyanosis, no edema   Other:           Data:   All microbiology laboratory data reviewed.  Recent Labs   Lab Test  10/04/17   0755  10/03/17   0720  10/02/17   2123   WBC  4.1  8.1  12.4*   HGB  10.2*  9.9*  11.0*   HCT  31.9*  30.1*  32.6*   MCV  104*  102*  100   PLT  29*  39*  53*     Recent Labs   Lab Test  10/04/17   0755  10/03/17   0720  10/02/17   2123   CR  1.50*  1.66*  1.72*     Recent Labs   Lab Test  10/02/17   1146   SED  28*     Recent Labs   Lab Test  10/03/17   1530  10/03/17   0100  10/02/17   2205  10/02/17   2123  07/27/17   1343  07/27/17   1335  07/26/17   0920  07/26/17   0915  02/22/12   1635   CULT  Culture negative monitoring continues  Culture negative monitoring continues  PENDING  No growth  Cultured on the 1st day of incubation:  Escherichia coli  Susceptibility testing done on previous specimen  *  Critical Value/Significant Value, preliminary result only, called to and read back by   Laura Perez RN [email or web address] @ 9156 10.3.17 JE    Cultured on the 1st day of incubation:  Escherichia coli  *  Critical Value/Significant Value, preliminary result only, called to and read back by   Laura Perez RN [email or web address] @ 1001 10.3.17 ROMAINE    (Note)  POSITIVE for E. COLI by AOL multiplex nucleic acid test. Final  identification and  antimicrobial susceptibility testing will be  verified by standard methods.    Specimen tested with Verigene multiplex, gram-negative blood culture  nucleic acid test for the following targets: Acinetobacter sp.,  Citrobacter sp., Enterobacter sp., Proteus sp., E. coli, K.  pneumoniae/oxytoca, P. aeruginosa, and the following resistance  markers: CTXM, KPC, NDM, VIM, IMP and OXA.    Critical Value/Significant Value called to and read back by Albina Bashir RN RHMS5, @1225 on 10/03/17 HM    No growth  No growth  No growth  Cultured on the 1st day of incubation: Staphylococcus epidermidis  Critical Value/Significant Value, preliminary result only, called to and read   back by Laxmi Feliz RN at [facility] MS3 at 8:05am 7/27/2017 ()  (Note)  POSITIVE for STAPHYLOCOCCUS EPIDERMIDIS and NEGATIVE for the mecA  gene (not resistant to methicillin) by Verigene nucleic acid test.  The mecA gene was not detected. Final identification and  antimicrobial susceptibility testing will be verified by standard  methods.    Specimen tested with Verigene multiplex, gram-positive blood culture  nucleic acid test for the following targets: Staph aureus, Staph  epidermidis, Staph lugdunensis, other Staph species, Enterococcus  faecalis, Enterococcus faecium, Streptococcus species, S. agalactiae,  S. anginosus grp., S. pneumoniae, S. pyogenes, Listeria sp., mecA  (methicillin resistance) and Nelsy/B (vancomycin resistance).    Critical Value/Significant Value called to and read back by Laxmi Feliz RN MS3. 7/27/17 @10 59. SCG    *  No growth after 6 days

## 2017-10-04 NOTE — PROGRESS NOTES
[facility]    Hospitalist Progress Note  Name: Junior Patricia    MRN: [id number]  Provider: Virginie Silva MD  Date of Service: 10/04/2017    Assessment & Plan   Summary of Stay: Junior Patricia is a 67 year old male who was admitted on 10/2/2017 with sepsis. Pt is a 67 yr old male with h/o renal transplant on immunosuppressants , HTN, Hep B cirrhosis who presented on 10/3/2017 with fevers, chills, abd distention and was found to have gram neg sepsis. S/p paracentesis 10/3        Sepsis (fever 103, WBC 12K, lactic acidosis) : unknown source  -- CXr -ve, UA bland. He does c/o abd distention and CT shows moderate ascites increased from previous-no other acute pathology.  --ID consult appreciated  --will cont Zosyn as its gram neg. DC VAnco per ID   -- procalc > 40  --taking PO will DC fluids  --f/u blood cx with E coli sepsis  --Negative influenza A/B,  C diff negative  --s/p paracentesis 10/3 with elevated PMNs and protein concerning for SBP  But lower LDH and high glucose not quite consistent with SBP     Loose stools: no e/o colitis on CT however the imaging was without contrast.   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 PENDING  --continue chronic prednisone 5 mg daily and Cyclosporin  --Monitor Crt closely,   --avoid nephrotoxic agents  --nephrology consult for management of immunosuppressants in the setting of infection  --d/w Dr. Marquez from transplant clinic at [facility] where patient is followed. 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 Untreated.   --Pt now has ascites- he still does not want to see a hepatologist     DM - HgbA1c 7.0 likely due to chronic prednisone.   --Not currently on meds, monitor glucose  -- sliding  scale    THrombocytopenia  -- chronic now lower   -- will Hold ASA     DVT Prophylaxis: Pneumatic Compression Devices  Code Status: Full Code    Disposition: Expected discharge 2-3 days if continues to improve      Interval History     Patient awake, feels better today still has low grade temp. C/o malaise and weakness. Review of all other systems negative    -Data reviewed today: I reviewed all new labs and imaging reports over the last 24 hours. I personally reviewed no images or EKG's today.    Physical Exam   Temp: 99  F (37.2  C) Temp src: Oral BP: 138/79   Heart Rate: 69 Resp: 20 SpO2: 100 % O2 Device: None (Room air)    Vitals:    10/03/17 0050 10/04/17 0504   Weight: 87.5 kg (192 lb 12.8 oz) 87.1 kg (192 lb 1.6 oz)     Vital Signs with Ranges  Temp:  [97.2  F (36.2  C)-100  F (37.8  C)] 99  F (37.2  C)  Heart Rate:  [65-71] 69  Resp:  [16-21] 20  BP: (110-147)/(64-82) 138/79  SpO2:  [98 %-100 %] 100 %  I/O last 3 completed shifts:  In: 3025 [P.O.:720; I.V.:2305]  Out: -       General: Alert, awake, no acute distress. Appears slightly jaundiced   HEENT: NC/AT, eyes icteric, external occular movements intact, face symmetric.  Dentition WNL, MM moist.  Cardiac: RRR, S1, S2.  No murmurs appreciated.  Pulmonary: Normal chest rise, normal work of breathing.  Lungs CTA BL  Abdomen: soft,very distended, non tender  Bowel Sounds Present.  No guarding.  Extremities: no deformities.  Warm, well perfused.  Skin: no rashes or lesions noted.  Warm and Dry.  Neuro: No focal deficits noted.  Speech clear.  Coordination and strength grossly normal.  Psych: Appropriate affect.    Medications     NaCl 1,000 mL (10/04/17 0206)       diltiazem  120 mg Oral Daily     metoprolol  12.5 mg Oral BID     predniSONE  5 mg Oral Daily     piperacillin-tazobactam  3.375 g Intravenous Q6H     influenza Vac Split High-Dose  0.5 mL Intramuscular Prior to discharge     pantoprazole  40 mg Oral QAM     cycloSPORINE modified  50 mg Oral QAM      cycloSPORINE modified  25 mg Oral QPM     Data       Recent Labs  Lab 10/04/17  0755 10/03/17  0720 10/02/17  2123   WBC 4.1 8.1 12.4*   HGB 10.2* 9.9* 11.0*   HCT 31.9* 30.1* 32.6*   * 102* 100   PLT 29* 39* 53*       Recent Labs  Lab 10/04/17  0755 10/03/17  0720 10/02/17  2123    133 131*   POTASSIUM 3.8 3.8 4.0   CHLORIDE 109 108 103   CO2 18* 19* 18*   ANIONGAP 7 6 10   GLC 93 109* 169*   BUN 22 27 29   CR 1.50* 1.66* 1.72*   GFRESTIMATED 47* 42* 40*   GFRESTBLACK 57* 50* 48*   AMARILYS 7.7* 7.4* 7.8*       Recent Labs  Lab 10/03/17  1530 10/03/17  0100 10/02/17  2205 10/02/17  2123   CULT Culture negative monitoring continues  PENDING  PENDING No growth Cultured on the 1st day of incubation:Gram negative rods*  Critical Value/Significant Value, preliminary result only, called to and read back by Laura Perez RN RHMS5 @ 1053 10.3.17 JE Cultured on the 1st day of incubation:Escherichia coli*  Critical Value/Significant Value, preliminary result only, called to and read back by Laura Perez RN RHMS6 @ 1006 10.3.17 JE  (Note)POSITIVE for E. COLI by Verigene multiplex nucleic acid test. Finalidentification and antimicrobial susceptibility testing will beverified by standard methods.Specimen tested with Verigene multiplex, gram-negative blood culturenucleic acid test for the following targets: Acinetobacter sp.,Citrobacter sp., Enterobacter sp., Proteus sp., E. coli, K.pneumoniae/oxytoca, P. aeruginosa, and the following resistancemarkers: CTXM, KPC, NDM, VIM, IMP and OXA.Critical Value/Significant Value called to and read back by DIMITRY Stone RHMS5, @1225 on 10/03/17 HM       Recent Labs  Lab 10/03/17  0720 10/02/17  2123   AST 47* 47*   ALT 56 65   ALKPHOS 103 138   BILITOTAL 1.8* 1.9*       Recent Labs  Lab 10/03/17  0720   INR 1.76*       Recent Labs  Lab 10/03/17  0836 10/02/17  2326 10/02/17  2128   LACT 1.4 2.1* 2.9*       Recent Labs  Lab 10/02/17  2123   LIPASE 182     No results for  input(s): TSH in the last 168 hours.  No results for input(s): TROPONIN, TROPI, TROPR in the last 168 hours.    Invalid input(s): TROP, TROPONINIES    Recent Labs  Lab 10/03/17  0100 10/02/17  1147   COLOR Danitza Yellow   APPEARANCE Clear Clear   URINEGLC Negative Negative   URINEBILI Negative Small*   URINEKETONE Negative Negative   SG 1.030 1.020   UBLD Negative Small*   URINEPH 5.0 6.5   PROTEIN 30* 100*   UROBILINOGEN  --  0.2   NITRITE Negative Negative   LEUKEST Negative Negative   RBCU 0 O - 2   WBCU 2 O - 2       Recent Results (from the past 24 hour(s))   US Paracentesis initial: high volume    Narrative    ULTRASOUND GUIDED PARACENTESIS   10/3/2017 3:46 PM     HISTORY:  THERAPEUTIC (high volume) Paracentesis with fluid analysis.     PROCEDURE:   Informed consent was obtained from the patient prior to  the procedure with discussion including the possible risks of  bleeding, infection and organ injury . Using 5 mL of 1% lidocaine for  local anesthesia, sterile technique, and sonographic guidance with  permanent image documentation, I placed an 8F paracentesis catheter  into the peritoneal fluid collection. This was used to aspirate 1750  mL of yellow, serous fluid in vacuum bottles, and some of this was  sent for any laboratory studies that had been ordered. There were no  immediate complications.  Intravenous albumen replacement was  performed according to protocol.      Impression    IMPRESSION:  Ultrasound guided paracentesis.    JENNI GLASER MD

## 2017-10-04 NOTE — PLAN OF CARE
Problem: Patient Care Overview  Goal: Plan of Care/Patient Progress Review  Pt alert and oriented. Up ind. Voiding. Still having loose stools. Reports gas pain. Gave simethicone with some relief. Walking in halls frequently.

## 2017-10-04 NOTE — PLAN OF CARE
Problem: Patient Care Overview  Goal: Plan of Care/Patient Progress Review  Outcome: No Change  Pt remains hospitalized for: sepsis    Ambulatory Status:  Pt up standby.  Orientation: a/o  VS:  Tmax 99.4-tylenol given   Pain:  denies  Resp: LS clear.  GI:  denies nausea.  good appetite, on regular diet. +BS.  Passing flatus.  Last BM 10/3.  :  Voiding without difficulty   Skin:  Bruising. Paracentesis site dressing CDI   Tx:  Zosyn   Consults:  Nephrology and ID   Disposition:  TBD

## 2017-10-05 NOTE — PLAN OF CARE
Problem: Patient Care Overview  Goal: Plan of Care/Patient Progress Review  Outcome: Improving  Tmax 99.5 with chills/rigors. Tylenol given and temp down to normal range. HR regular. Lungs clear. BLE swelling +2. Abdomen rounded. BM today. Voiding adequately. Remains on Zosyn. Blood sugar check @ HS 96. Ambulating halls independently.

## 2017-10-05 NOTE — PROGRESS NOTES
[facility]  Infectious Disease Progress Note          Assessment and Plan:   IMPRESSION:   1.  A 67-year-old male with complicated medical history admitted with acute sepsis, found to have gram-negative rods in the blood, source not entirely clear, does have some abdominal distention, ascites, gallstones, kidney stones, but none as an obvious source at this point, kleb in cx, ? SBP.   2.  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   3.  Prior renal transplant 14 years ago with no major infection complications.   4.  Mild chronic renal insufficiency post-transplant.   5.  Uric acid nephropathy with kidney stones, no presumed renal functions so will unlikely to be source of infection.   6.  Gallstones, not previously known, no evidence of cholecystitis as cause of the current bacteremia.   7.  Latent tuberculosis, previously treated.       RECOMMENDATIONS:   1.  Continue Zosyn, po quinolone as disposition option  2.  Await full culture on fluid, .neg so far   3.  Already has had a CT scan,  abdominal fluidt? peritonitis. 1915 WBc but not all PMNs, cx pending  4.  Not otherwise obvious source seen with initial workup, if clinically improves, simply treat with antibiotics directed against the organism, likely 10 days lev , would keep IV in hospital 1 day more with still some LGF and source uncertain        Interval History:   no new complaints and doing well; no cp, sob, n/v/d, or abd pain. Feels better T down but still 100.3 peritoneal fluid noted, BC kleb aminoglyc and sulfa R , quinolone and pip sens              Medications:       diltiazem  120 mg Oral Daily     metoprolol  12.5 mg Oral BID     predniSONE  5 mg Oral Daily     piperacillin-tazobactam  3.375 g Intravenous Q6H     influenza Vac Split High-Dose  0.5 mL Intramuscular Prior to discharge     pantoprazole  40 mg Oral QAM     cycloSPORINE modified  50 mg Oral QAM     cycloSPORINE modified  25 mg Oral QPM                   Physical Exam:   Blood pressure (!) 155/92, pulse 80, temperature 99.5  F (37.5  C), temperature source Oral, resp. rate 20, height 1.524 m (5'), weight 88.3 kg (194 lb 11.2 oz), SpO2 99 %.  Wt Readings from Last 2 Encounters:   10/05/17 88.3 kg (194 lb 11.2 oz)   10/02/17 85.7 kg (189 lb)     Vital Signs with Ranges  Temp:  [98.2  F (36.8  C)-100.3  F (37.9  C)] 99.5  F (37.5  C)  Heart Rate:  [66-99] 69  Resp:  [18-20] 20  BP: (121-160)/(74-92) 155/92  SpO2:  [98 %-100 %] 99 %    Constitutional: Awake, alert, cooperative, no apparent distress   Lungs: Clear to auscultation bilaterally, no crackles or wheezing   Cardiovascular: Regular rate and rhythm, normal S1 and S2, and no murmur noted   Abdomen: Normal bowel sounds, soft, mildlydistended, non-tender   Skin: No rashes, no cyanosis, no edema   Other:           Data:   All microbiology laboratory data reviewed.  Recent Labs   Lab Test  10/05/17   0716  10/04/17   0755  10/03/17   0720   WBC  4.7  4.1  8.1   HGB  10.5*  10.2*  9.9*   HCT  31.4*  31.9*  30.1*   MCV  100  104*  102*   PLT  39*  29*  39*     Recent Labs   Lab Test  10/05/17   0716  10/04/17   0755  10/03/17   0720   CR  1.51*  1.50*  1.66*     Recent Labs   Lab Test  10/02/17   1146   SED  28*     Recent Labs   Lab Test  10/03/17   1530  10/03/17   0100  10/02/17   2205  10/02/17   2123  07/27/17   1343  07/27/17   1335  07/26/17   0920  07/26/17   0915  02/22/12   1635   CULT  Culture negative after 18 hours  Culture negative monitoring continues  Culture negative monitoring continues  No growth  Cultured on the 1st day of incubation:  Escherichia coli  Susceptibility testing done on previous specimen  *  Critical Value/Significant Value, preliminary result only, called to and read back by   Laura Perez RN RHMS5 @ 1053 10.3.17 JE    Cultured on the 1st day of incubation:  Escherichia coli  *  Critical Value/Significant Value, preliminary result only, called to and read back by   Laura Perez  RN RHMS6 @ 1006 10.3.17     (Note)  POSITIVE for E. COLI by Verigene multiplex nucleic acid test. Final  identification and antimicrobial susceptibility testing will be  verified by standard methods.    Specimen tested with Verigene multiplex, gram-negative blood culture  nucleic acid test for the following targets: Acinetobacter sp.,  Citrobacter sp., Enterobacter sp., Proteus sp., E. coli, K.  pneumoniae/oxytoca, P. aeruginosa, and the following resistance  markers: CTXM, KPC, NDM, VIM, IMP and OXA.    Critical Value/Significant Value called to and read back by Albina Bashir RN RHMS5, @1225 on 10/03/17 HM    No growth  No growth  No growth  Cultured on the 1st day of incubation: Staphylococcus epidermidis  Critical Value/Significant Value, preliminary result only, called to and read   back by Laxmi Feliz RN at [facility] MS3 at 8:05am 7/27/2017 ()  (Note)  POSITIVE for STAPHYLOCOCCUS EPIDERMIDIS and NEGATIVE for the mecA  gene (not resistant to methicillin) by Verigene nucleic acid test.  The mecA gene was not detected. Final identification and  antimicrobial susceptibility testing will be verified by standard  methods.    Specimen tested with Verigene multiplex, gram-positive blood culture  nucleic acid test for the following targets: Staph aureus, Staph  epidermidis, Staph lugdunensis, other Staph species, Enterococcus  faecalis, Enterococcus faecium, Streptococcus species, S. agalactiae,  S. anginosus grp., S. pneumoniae, S. pyogenes, Listeria sp., mecA  (methicillin resistance) and Nelsy/B (vancomycin resistance).    Critical Value/Significant Value called to and read back by Laxmi Feliz RN MS3. 7/27/17 @10 59. SCG    *  No growth after 6 days

## 2017-10-05 NOTE — PLAN OF CARE
Problem: Patient Care Overview  Goal: Plan of Care/Patient Progress Review  Outcome: No Change  Pt admitted for sepsis, tmax 99.5 today.  Denies pain and nausea. Ambulates independently.  Abdomen distended, also edema to lower extremities +2.  Continue zosyn, will need one more day IV antibiotics. ID and nephrology following.

## 2017-10-05 NOTE — PLAN OF CARE
Problem: Patient Care Overview  Goal: Plan of Care/Patient Progress Review  Outcome: Improving  Pt up indep        Denies pain  TMAX:  100.3  Tylenol given.  Recheck 98.6  BP elevated  LS clear.  SOB on exertion.  Occ nonprod cough.  RA  Hypo BS.  One loose BM overnight.  Denies n/v.  Reg diet Abd distended  B  Voiding  TX:  Zosyn  Disp:  TBD

## 2017-10-05 NOTE — PROGRESS NOTES
[facility]    Hospitalist Progress Note  Name: Junior Patricia    MRN: [id number]  Provider: Virginie Silva MD  Date of Service: 10/05/2017    Assessment & Plan   Summary of Stay: Junior Patricia is a 67 year old male who was admitted on 10/2/2017 with sepsis. Pt is a 67 yr old male with h/o renal transplant on immunosuppressants , HTN, Hep B cirrhosis who presented on 10/3/2017 with fevers, chills, abd distention and was found to have gram neg sepsis. S/p paracentesis 10/3 with ? Findings cultures pending.       Sepsis (fever 103, WBC 12K, lactic acidosis) : unknown source  -- CXr -ve, UA bland. He does c/o abd distention and CT shows moderate ascites increased from previous-no other acute pathology.  --ID consult appreciated  --will cont Zosyn as its gram neg. DC VAnco per ID until full sensitivities are available and if peritoneal cultures remain negative. Will switch to quinolone as PO on discharge  -- procalc > 40  --taking PO will DC fluids  --f/u blood cx with E coli bacteremia sensitivities  pending  --Negative influenza A/B,  C diff negative  --s/p paracentesis 10/3 with elevated PMNs and protein concerning for SBP  But lower LDH and high glucose not quite consistent with SBP     Loose stools: no e/o colitis on CT however the imaging was without contrast.   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 PENDING  --continue chronic prednisone 5 mg daily and Cyclosporin  --Monitor Crt closely,   --avoid nephrotoxic agents  --nephrology consult for management of immunosuppressants in the setting of infection  --d/w Dr. Marquez from transplant clinic at [facility] [facility] where patient is followed. 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 Untreated.   --Pt now has  ascites- he still does not want to see a hepatologist     DM - HgbA1c 7.0 likely due to chronic prednisone.   --Not currently on meds, monitor glucose  -- sliding scale    THrombocytopenia  -- chronic now lower   -- will Hold ASA     DVT Prophylaxis: Pneumatic Compression Devices  Code Status: Full Code    Disposition: Expected discharge 2-3 days if continues to improve      Interval History     Patient awake, feels better today still has low grade temp. C/o malaise and weakness. Review of all other systems negative    -Data reviewed today: I reviewed all new labs and imaging reports over the last 24 hours. I personally reviewed no images or EKG's today.    Physical Exam   Temp: 99.5  F (37.5  C) Temp src: Oral BP: (!) 155/92   Heart Rate: 69 Resp: 20 SpO2: 99 % O2 Device: None (Room air)    Vitals:    10/03/17 0050 10/04/17 0504 10/05/17 0459   Weight: 87.5 kg (192 lb 12.8 oz) 87.1 kg (192 lb 1.6 oz) 88.3 kg (194 lb 11.2 oz)     Vital Signs with Ranges  Temp:  [98.2  F (36.8  C)-100.3  F (37.9  C)] 99.5  F (37.5  C)  Heart Rate:  [66-99] 69  Resp:  [18-20] 20  BP: (121-160)/(74-92) 155/92  SpO2:  [98 %-100 %] 99 %  I/O last 3 completed shifts:  In: 991 [P.O.:200; I.V.:791]  Out: -       General: Alert, awake, no acute distress. Appears slightly jaundiced   HEENT: NC/AT, eyes icteric, external occular movements intact, face symmetric.  Dentition WNL, MM moist.  Cardiac: RRR, S1, S2.  No murmurs appreciated.  Pulmonary: Normal chest rise, normal work of breathing.  Lungs CTA BL  Abdomen: soft,very distended, non tender  Bowel Sounds Present.  No guarding.  Extremities: no deformities.  Warm, well perfused.  Skin: no rashes or lesions noted.  Warm and Dry.  Neuro: No focal deficits noted.  Speech clear.  Coordination and strength grossly normal.  Psych: Appropriate affect.    Medications        diltiazem  120 mg Oral Daily     metoprolol  12.5 mg Oral BID     predniSONE  5 mg Oral Daily     piperacillin-tazobactam   3.375 g Intravenous Q6H     influenza Vac Split High-Dose  0.5 mL Intramuscular Prior to discharge     pantoprazole  40 mg Oral QAM     cycloSPORINE modified  50 mg Oral QAM     cycloSPORINE modified  25 mg Oral QPM     Data       Recent Labs  Lab 10/05/17  0716 10/04/17  0755 10/03/17  0720   WBC 4.7 4.1 8.1   HGB 10.5* 10.2* 9.9*   HCT 31.4* 31.9* 30.1*    104* 102*   PLT 39* 29* 39*       Recent Labs  Lab 10/05/17  0716 10/04/17  0755 10/03/17  0720    134 133   POTASSIUM 4.0 3.8 3.8   CHLORIDE 106 109 108   CO2 20 18* 19*   ANIONGAP 8 7 6   * 93 109*   BUN 19 22 27   CR 1.51* 1.50* 1.66*   GFRESTIMATED 46* 47* 42*   GFRESTBLACK 56* 57* 50*   AMARILYS 8.2* 7.7* 7.4*       Recent Labs  Lab 10/03/17  1530 10/03/17  0100 10/02/17  2205 10/02/17  2123   CULT Culture negative after 18 hours  Culture negative monitoring continues  Culture negative monitoring continues No growth Cultured on the 1st day of incubation:Escherichia coliSusceptibility testing done on previous specimen*  Critical Value/Significant Value, preliminary result only, called to and read back by Laura Perez RN MS5 @ 1053 10.3.17 JE Cultured on the 1st day of incubation:Escherichia coli*  Critical Value/Significant Value, preliminary result only, called to and read back by Laura Perez RN MS6 @ 1006 10.3.17 JE  (Note)POSITIVE for E. COLI by ASYM III multiplex nucleic acid test. Finalidentification and antimicrobial susceptibility testing will beverified by standard methods.Specimen tested with Verigene multiplex, gram-negative blood culturenucleic acid test for the following targets: Acinetobacter sp.,Citrobacter sp., Enterobacter sp., Proteus sp., E. coli, K.pneumoniae/oxytoca, P. aeruginosa, and the following resistancemarkers: CTXM, KPC, NDM, VIM, IMP and OXA.Critical Value/Significant Value called to and read back by DIMITRY Stone RHMS5, @1225 on 10/03/17        Recent Labs  Lab 10/03/17  0720 10/02/17  2123   AST 47*  47*   ALT 56 65   ALKPHOS 103 138   BILITOTAL 1.8* 1.9*       Recent Labs  Lab 10/03/17  0720   INR 1.76*       Recent Labs  Lab 10/03/17  0836 10/02/17  2326 10/02/17  2128   LACT 1.4 2.1* 2.9*       Recent Labs  Lab 10/02/17  2123   LIPASE 182     No results for input(s): TSH in the last 168 hours.  No results for input(s): TROPONIN, TROPI, TROPR in the last 168 hours.    Invalid input(s): TROP, TROPONINIES    Recent Labs  Lab 10/03/17  0100 10/02/17  1147   COLOR Danitza Yellow   APPEARANCE Clear Clear   URINEGLC Negative Negative   URINEBILI Negative Small*   URINEKETONE Negative Negative   SG 1.030 1.020   UBLD Negative Small*   URINEPH 5.0 6.5   PROTEIN 30* 100*   UROBILINOGEN  --  0.2   NITRITE Negative Negative   LEUKEST Negative Negative   RBCU 0 O - 2   WBCU 2 O - 2       No results found for this or any previous visit (from the past 24 hour(s)).

## 2017-10-06 NOTE — PROGRESS NOTES
40 min spent on discharge.  I discussed with patient and wife.   Will check with Dr Andrade if levaquin ok with prednisone at discharge.

## 2017-10-06 NOTE — PROGRESS NOTES
[facility]  Infectious Disease Progress Note          Assessment and Plan:   IMPRESSION:   1.  A 67-year-old male with complicated medical history admitted with acute sepsis, found to have gram-negative rods in the blood, source not entirely clear, does have some abdominal distention, ascites, gallstones, kidney stones, but none as an obvious source at this point, kleb in cx, ? SBP.   2.  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   3.  Prior renal transplant 14 years ago with no major infection complications.   4.  Mild chronic renal insufficiency post-transplant.   5.  Uric acid nephropathy with kidney stones, no presumed renal functions so will unlikely to be source of infection.   6.  Gallstones, not previously known, no evidence of cholecystitis as cause of the current bacteremia.   7.  Latent tuberculosis, previously treated.       RECOMMENDATIONS:   1.  On Zosyn, po quinolone and disposition OK  2.   culture of on fluid, .neg   3.  Already has had a CT scan,  abdominal fluidt? peritonitis. 1915 WBc but not all PMNs, cx neg  4.  Not otherwise obvious source seen with initial workup,now clinically improved,plan simply treat with antibiotics directed against the organism,  10 days lev 500 daily , LGF resolved        Interval History:   no new complaints and doing well; no cp, sob, n/v/d, or abd pain. Feels better T down last 24 peritoneal fluid noted, BC kleb aminoglyc and sulfa R , quinolone and pip sens              Medications:       sodium chloride (PF)  3 mL Intracatheter Q8H     diltiazem  120 mg Oral Daily     metoprolol  12.5 mg Oral BID     predniSONE  5 mg Oral Daily     piperacillin-tazobactam  3.375 g Intravenous Q6H     influenza Vac Split High-Dose  0.5 mL Intramuscular Prior to discharge     pantoprazole  40 mg Oral QAM     cycloSPORINE modified  50 mg Oral QAM     cycloSPORINE modified  25 mg Oral QPM                  Physical Exam:   Blood pressure  124/78, pulse 80, temperature 99  F (37.2  C), temperature source Oral, resp. rate 16, height 1.524 m (5'), weight 87.8 kg (193 lb 9.6 oz), SpO2 100 %.  Wt Readings from Last 2 Encounters:   10/06/17 87.8 kg (193 lb 9.6 oz)   10/02/17 85.7 kg (189 lb)     Vital Signs with Ranges  Temp:  [98.5  F (36.9  C)-99.6  F (37.6  C)] 99  F (37.2  C)  Heart Rate:  [64-66] 64  Resp:  [16-20] 16  BP: (124-148)/(75-89) 124/78  SpO2:  [99 %-100 %] 100 %    Constitutional: Awake, alert, cooperative, no apparent distress   Lungs: Clear to auscultation bilaterally, no crackles or wheezing   Cardiovascular: Regular rate and rhythm, normal S1 and S2, and no murmur noted   Abdomen: Normal bowel sounds, soft, mildlydistended, non-tender   Skin: No rashes, no cyanosis, no edema   Other:           Data:   All microbiology laboratory data reviewed.  Recent Labs   Lab Test  10/05/17   0716  10/04/17   0755  10/03/17   0720   WBC  4.7  4.1  8.1   HGB  10.5*  10.2*  9.9*   HCT  31.4*  31.9*  30.1*   MCV  100  104*  102*   PLT  39*  29*  39*     Recent Labs   Lab Test  10/06/17   0750  10/05/17   0716  10/04/17   0755   CR  1.40*  1.51*  1.50*     Recent Labs   Lab Test  10/02/17   1146   SED  28*     Recent Labs   Lab Test  10/03/17   1530  10/03/17   0100  10/02/17   2205  10/02/17   2123  07/27/17   1343  07/27/17   1335  07/26/17   0920  07/26/17   0915  02/22/12   1635   CULT  Culture negative after 3 days  Culture negative monitoring continues  Culture negative monitoring continues  No growth  Cultured on the 1st day of incubation:  Escherichia coli  Susceptibility testing done on previous specimen  *  Critical Value/Significant Value, preliminary result only, called to and read back by   Laura Perez RN RHMS5 @ 1053 10.3.17 ROMAINE    Cultured on the 1st day of incubation:  Escherichia coli  *  Critical Value/Significant Value, preliminary result only, called to and read back by   Laura Perez RN RHMS6 @ 1006 10.3.17 ROMAINE    (Note)  POSITIVE  for E. COLI by Verigene multiplex nucleic acid test. Final  identification and antimicrobial susceptibility testing will be  verified by standard methods.    Specimen tested with Verigene multiplex, gram-negative blood culture  nucleic acid test for the following targets: Acinetobacter sp.,  Citrobacter sp., Enterobacter sp., Proteus sp., E. coli, K.  pneumoniae/oxytoca, P. aeruginosa, and the following resistance  markers: CTXM, KPC, NDM, VIM, IMP and OXA.    Critical Value/Significant Value called to and read back by Albina Bashir RN RHMS5, @1225 on 10/03/17 HM    No growth  No growth  No growth  Cultured on the 1st day of incubation: Staphylococcus epidermidis  Critical Value/Significant Value, preliminary result only, called to and read   back by Laxmi Feliz RN at [facility] MS3 at 8:05am 7/27/2017 ()  (Note)  POSITIVE for STAPHYLOCOCCUS EPIDERMIDIS and NEGATIVE for the mecA  gene (not resistant to methicillin) by Verigene nucleic acid test.  The mecA gene was not detected. Final identification and  antimicrobial susceptibility testing will be verified by standard  methods.    Specimen tested with Verigene multiplex, gram-positive blood culture  nucleic acid test for the following targets: Staph aureus, Staph  epidermidis, Staph lugdunensis, other Staph species, Enterococcus  faecalis, Enterococcus faecium, Streptococcus species, S. agalactiae,  S. anginosus grp., S. pneumoniae, S. pyogenes, Listeria sp., mecA  (methicillin resistance) and Nelsy/B (vancomycin resistance).    Critical Value/Significant Value called to and read back by Laxmi Feliz RN MS3. 7/27/17 @10 59. SCG    *  No growth after 6 days

## 2017-10-06 NOTE — PLAN OF CARE
Problem: Patient Care Overview  Goal: Plan of Care/Patient Progress Review  Outcome: Adequate for Discharge Date Met:  10/06/17  Pt refused  for nursing assessment as well as discharge. Pt discharged to home in stable condition. New medication for levaquin filled and sent home with patient. All belongings packed and sent home with patient and spouse. All questions answered. Pt aware of need to follow up with primary MD within 1 week and kidney specialist within 2 weeks. Up independently, can make needs known. Wife to drive patient home.

## 2017-10-06 NOTE — PROGRESS NOTES
[facility]     Renal Progress Note       SHORTHAND KEY FOR MY NOTES:  c = with, s = without, p = after, a = before, x = except, asx = asymptomatic, tx = transplant or treatment, sx = symptoms or symptomatic, cx = canceled or culture, rxn = reaction, yday = yesterday, nl = normal, abx = antibiotics, fxn = function, dx = diagnosis, dz = disease, m/h = melena/hematochezia, c/d/l/ha = cramping/dizziness/lightheadedness/headache, d/c = discharge or diarrhea/constipation, f/c/n/v = fevers/chills/nausea/vomiting, cp/sob = chest pain/shortness of breath.         Assessment/Plan:     1.  NANCY.  Resolved.  Cr is back to baseline.  A.  Follow labs.    2.  ESKD s/p LDKT.  His MMF has been on hold since admission given the low hb/plts.  [place] [facility] [place]u c him next wk re when to resume it.  He is still on CSA and pred.  A.  Continue CSA, dilt, pred.  B.  Hold MMF until [place] by [place] [facility] [place]  He should f/u next wk.    3.  E. Coli bacteremia.  Pt is afebrile.  He is on Zosyn.  A.  Due to change to oral abx today.    4.  Dispo.  Pt wants to leave today.  This is up to the hospitalist team.          Interval History:     Pt feels ok today x some mild abd tenderness near site of tx.  He is urinating ok and denies any d/h.  He has not f/c for over 24h.             Medications and Allergies:       sodium chloride (PF)  3 mL Intracatheter Q8H     diltiazem  120 mg Oral Daily     metoprolol  12.5 mg Oral BID     predniSONE  5 mg Oral Daily     piperacillin-tazobactam  3.375 g Intravenous Q6H     influenza Vac Split High-Dose  0.5 mL Intramuscular Prior to discharge     pantoprazole  40 mg Oral QAM     cycloSPORINE modified  50 mg Oral QAM     cycloSPORINE modified  25 mg Oral QPM     Allergies   Allergen Reactions     Lisinopril Swelling     No Known Drug Allergies      Simvastatin Itching          Physical Exam:     Vitals were reviewed    Heart Rate: 64, Blood pressure 124/78, pulse 80, temperature  99  F (37.2  C), temperature source Oral, resp. rate 16, height 1.524 m (5'), weight 87.8 kg (193 lb 9.6 oz), SpO2 100 %.  Wt Readings from Last 3 Encounters:   10/06/17 87.8 kg (193 lb 9.6 oz)   10/02/17 85.7 kg (189 lb)   09/29/17 86 kg (189 lb 9.6 oz)     Intake/Output Summary (Last 24 hours) at 10/06/17 1047  Last data filed at 10/06/17 0421   Gross per 24 hour   Intake              200 ml   Output                0 ml   Net              200 ml     GENERAL APPEARANCE: pleasant, NAD, alert  HEENT:  Eyes/ears/nose/neck grossly normal  RESP: lungs cta b c good efforts, no crackles  CV: RRR c 1/6 m, nl S1/S2   ABDOMEN: obese, soft, some tenderness to palp at transplant site, bs present  EXTREMITIES/SKIN: tr ble edema         Data:     CBC RESULTS:     Recent Labs  Lab 10/05/17  0716 10/04/17  0755 10/03/17  0720 10/02/17  2123 10/02/17  1146   WBC 4.7 4.1 8.1 12.4* 12.5*   RBC 3.13* 3.08* 2.96* 3.26* 3.49*   HGB 10.5* 10.2* 9.9* 11.0* 11.6*   HCT 31.4* 31.9* 30.1* 32.6* 35.3*   PLT 39* 29* 39* 53* 53*     Basic Metabolic Panel:    Recent Labs  Lab 10/06/17  0750 10/05/17  0716 10/04/17  0755 10/03/17  0720 10/02/17  2123   * 134 134 133 131*   POTASSIUM 3.8 4.0 3.8 3.8 4.0   CHLORIDE 106 106 109 108 103   CO2 20 20 18* 19* 18*   BUN 15 19 22 27 29   CR 1.40* 1.51* 1.50* 1.66* 1.72*   * 107* 93 109* 169*   AMARILYS 8.1* 8.2* 7.7* 7.4* 7.8*     INR  Recent Labs  Lab 10/03/17  0720   INR 1.76*      Attestation:   I have reviewed today's relevant vital signs, notes, medications, labs and imaging.    Ruben Ochoa MD  [facility] - Nephrology  [phone]

## 2017-10-06 NOTE — PLAN OF CARE
Problem: Patient Care Overview  Goal: Plan of Care/Patient Progress Review  Outcome: Improving  Pt up indep        TMAX:  99.6  vss  Denies pain  LS clear.  RA  +BS.  Passing flatus.  Pt reports 2 soft BMs  Overnight.  Denies n/v.  Reg diet  B  Voiding  +2 BLE edema  TX:  Zosyn  Disp:  TBD

## 2017-10-06 NOTE — PLAN OF CARE
Problem: Patient Care Overview  Goal: Plan of Care/Patient Progress Review  Outcome: Improving  Patient was admitted for sepsis, temp during this shift was 98.5. Denies pain and nausea. Ambulates independently and calls well. Abdomen distended, also edema to lower extremities +2.  Continue zosyn, will need one more day IV antibiotics. ID and nephrology following. Continue plan of care.

## 2017-10-07 NOTE — DISCHARGE SUMMARY
PRIMARY CARE PHYSICIAN:  Erica Vance.        COPY TO:  Dr. Codi Marquez at [facility], Nephrology Clinic.       DATE OF ADMISSION:  10/02/2017.        DATE OF DISCHARGE:  10/06/2017.        DISPOSITION:  To home.      DIAGNOSES:   1.  Sepsis of unclear source.   2.  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   3.  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   4.  Diabetes mellitus.   5.  Thrombocytopenia.   6.  Obesity.      PROCEDURES:   1.  Infectious Disease consultation.   2.  Nephrology consultation.   3.  Chest x-ray, which was unremarkable.   4.  CT of the abdomen and pelvis which showed moderate ascites, cirrhotic liver, and cholelithiasis.   5.  Ultrasound-guided paracentesis of 1750 cc of fluid.      ALLERGIES:  Lisinopril, simvastatin.      PENDING TEST RESULTS:  None.      PRIMARY CARE PROVIDER:  Erica Vance.      HOSPITAL COURSE:   1.  Sepsis.  Mr. Junior Patricia is a 67-year-old man on chronic immunosuppressant for renal transplant who presented with a fever, elevated white blood cell count, and lactic acidosis.  He was started on empiric broad-spectrum antibiotics with Zosyn and vancomycin.  He underwent extensive workup including blood cultures, paracentesis, chest x-ray, and urinalysis.  Infectious Disease specialist also was consulted to try to help determine the source of his infection.  He developed 2 blood cultures positive for E. coli; however, the source of this was undetermined.  Fortunately, clinically he did very well.  He became afebrile at discharge was feeling back to his baseline.  In consultation with Infectious Disease specialist, it was recommended that he be discharged on oral levofloxacin.  It was understood that there is some risk associated with Achilles tendon rupture as he also is on low-dose prednisone; however, there are no other antibiotic options  and it was felt that the benefit outweighs the potential risk.  The plan is for him to follow up closely in clinic, especially after antibiotics are completed to assess if this recurs.   2.  End-stage renal disease with renal transplant.  During his hospitalization during acute infection, we held his CellCept.  This was after discussion with his nephrologist at the [facility].  When his acute infection cleared, he was resumed on all of his immunosuppressive agents.  He will follow up again with them in clinic.  Upon admission, his creatinine was a bit elevated at 1.7 and this improved to 1.4 prior to discharge.  Nephrology was also consulted during his hospitalization.        DISCHARGE MEDICATIONS:   1.  Levofloxacin 500 mg a day for 10 days.   2.  Cyclosporine 25 mg in the evening and 50 mg in the morning.   3.  Prednisone 5 mg a day.   4.  Aspirin 81 mg a day.   5.  Diltiazem  mg daily.   6.  Metoprolol 12.5 mg twice a day.   7.  CellCept 1000 mg twice a day.      FOLLOWUP APPOINTMENTS:   1.  Follow up with primary clinic in 5-7 days.   2.  Follow up with nephrologist in 10-14 days.         HUSEYIN PATEL MD             D: 10/07/2017 07:05   T: 10/07/2017 07:51   MT: JENNY#[id number]      Name:     TEE CRAIG   MRN:      [id number]        Account:        [id number]   :      1950           Admit Date:     [id number]                                  Discharge Date: 10/06/2017      Document: [id number]       cc: Codi Marquez MD       [facility]

## 2017-10-09 NOTE — LETTER
[facility]  Complex Care Plan  About Me  Patient Name:  Junior Patricia    YOB: 1950  Age:   67 year old   [place] MRN: [id number] Telephone Information:     Home Phone [phone]   Mobile [phone]       [place]    [address] [place] [address] Email address:  No e-mail address on record      Emergency Contact(s)  Name Relationship Lgl Grd Work Phone Home Phone Mobile Phone   1. MINA PATRICIA S Spouse   [phone]   2. XE GERARDO PATRICIA Other   [phone]            Primary language:  Aron     needed? No   [place] [employer]:  [phone] op. 1  Other communication barriers: No  Preferred Method of Communication:  Phone  Current living arrangement: I live in a private home with family  Mobility Status/ Medical Equipment: Independent  Other information to know about me:    Health Maintenance  Health Maintenance Reviewed:   Health Maintenance Due   Topic Date Due     FOOT EXAM Q1 YEAR  05/15/1951     EYE EXAM Q1 YEAR  05/15/1951     ADVANCE DIRECTIVE PLANNING Q5 YRS  05/15/2006     PNEUMOCOCCAL (1 of 2 - PCV13) 05/15/2016     TETANUS IMMUNIZATION (SYSTEM ASSIGNED)  07/27/2017     My Access Plan  Medical Emergency 911   Primary Clinic Line [facility]- [phone]   24 Hour Appointment Line [phone] or  [phone]) (toll-free)   24 Hour Nurse Line [phone] (toll-free)   Preferred Urgent Care [facility] [phone]   Preferred Hospital [facility]  [phone]   Preferred Pharmacy [facility] - [place], [place] - [address]     Behavioral Health Crisis Line The National Suicide Prevention Lifeline at 1-546.970.1035 or 911     My Care Team Members   Lenin Obrien PA-C PCP - General Physician Assistant 10/10/17    Phone: [phone] Fax: [phone]         Mariela Moraes, RN Clinic Care Coordinator  10/9/17     Phone: [phone] Fax: [phone]        My Care Plans  Self Management and Treatment Plan  Goals and (Comments)  Goal #1: MONITOR FOR FEVER AND REPORT        Action Plans on File: Asthma  Advance Care Plans/Directives Type:   Type Advanced Care Plans/Directives:  (NA)    My Medical and Care Information  Problem List   Patient Active Problem List   Diagnosis     Kidney replaced by transplant     Obesity     Gout     HYPERLIPIDEMIA LDL GOAL <100     CKD (chronic kidney disease) stage 3, GFR 30-59 ml/min     Colon polyps     Hepatitis B infection     Type 2 diabetes mellitus with hemoglobin A1c goal of less than 8.0% (H)     Macrocytosis without anemia     Asthma exacerbation     Colitis     Sepsis (H)        Current Medications and Allergies:  See printed Medication Report.    Care Coordination Start Date:  (NA)   Frequency of Care Coordination: 1 week    Form Last Updated: 10/10/2017

## 2017-10-09 NOTE — LETTER
[place] CARE COORDINATION  [address] [address]  [phone]    October 10, 2017    Juniorlucio Patricia  [address] [place] [place] [address]     Dear Junior,  I am the Clinic Care Coordinator that works with your primary care provider's clinic. I wanted to introduce myself and provide you with my contact information for you to be able to call me with any questions or concerns. I wanted to thank you for spending the time to talk with me.  Below is a description of what Clinic Care Coordination is and how I can further assist you.     The Clinic Care Coordinator role is a Registered Nurse and/or  who understands the health care system. The goal of Clinic Care Coordination is to help you manage your health and improve access to the [facility] system in the most efficient manner.  The Registered Nurse can assist you in meeting your health care goals by providing education, coordinating services, and strengthening the communication among your providers. The  can assist you with financial, behavioral, psychosocial, and chemical dependency and counseling/psychiatric resources.    Please feel free to keep this letter and contact information to contact me at [phone] with any further questions or concerns that may arise. We at [facility] are focused on providing you with the highest-quality healthcare experience possible and that all starts with you.       Sincerely,     Mariela Mroaes Care Coordinator RN  [facility]  [phone]  October 10, 2017     Enclosed: I have enclosed a copy of the Complex Care Plan. This has helpful information and goals that we have talked about. Please keep this in an easy to access place to use as needed.

## 2017-10-09 NOTE — PROGRESS NOTES
Clinic Care Coordination Contact  [facility]/Voicemail    Referral Source: PCP  Clinical Data: Care Coordinator Outreach - post hospital follow up severe sepsis - admitted from 10/2 - 10/6   Discharged with levaquin and f/u appt. With Sha BOLAÑOS 10/13     Prior to admission Sha Obrien PAC had placed a referral as patient was having financial difficulties - need to briefly assess and report back to CCSW if she needs to assist     Outreach attempted x 1.  Left message on voicemail with call back information and requested return call.  Plan:  Care Coordinator will try to reach patient again in 1-2 business days.    Mariela Moraes Care Coordinator RN  [facility]  [phone]  October 9, 2017

## 2017-10-10 NOTE — PROGRESS NOTES
Clinic Care Coordination Contact  OUTREACH    Referral Information:  Referral Source: PCP  Reason for Contact: POST HOSPITAL FOLLOW UP   Care Conference: No     Universal Utilization:   ED Visits in last year: 0  Hospital visits in last year: 2  Last PCP appointment: 10/02/17  Missed Appointments:  (NO CONCERNS)  Concerns: NA  Multiple Providers or Specialists: YES    Clinical Concerns:  Current Medical Concerns: patient admitted from 10/2 - 10/6 with severe sepsis   Patient was discharged with levaquin - he is doing well since discharge - CCRN asked about fever since discharge and patient reports no fever/chills   Patient is resting - wife is assisting in caring for him   Patient does not have any questions regarding his hospital stay   He denies cough/dysuria - no symptoms at this time   Patient has a f/u appt. With pcp on 10/13/17 at 7:00 and CCRN clarified with patient that he knew about this appt. and that he could make the appt     DM - well controlled   Lab Results   Component Value Date    A1C 5.9 07/28/2017    A1C 7.0 04/05/2017    A1C 6.7 08/04/2014    A1C 6.8 05/02/2012    A1C 6.3 03/15/2006     Current Behavioral Concerns: states he is doing well - no concerns at this time    Education Provided to patient: continue monitoring for fever and any new symptoms that arise and call with questions/concerns     Clinical Pathway Name: None    Medication Management:  States he is doing well with levaquin - no side effects to report   Has all other medications at this time     Functional Status:  Mobility Status: Independent  Equipment Currently Used at Home: none  Transportation: wife assists   Retired      Psychosocial:  Current living arrangement: I live in a private home with family   Financial/Insurance: care coordination received a referral from Sha BOLAÑOS with noted financial concerns by patient - today patient denies any concerns with finances. He states everything is fine. CCRN asked again if he was  able to afford everything needed and patient stated yes - everything fine no troubles   CCRN will send note back to Sha BOLAÑOS to readdress at upcoming visit      Resources and Interventions:  Current Resources:  (NA);  (NA)  PAS Number:  (NA)  Senior Linkage Line Referral Placed:  (NA)  Advanced Care Plans/Directives on file:: No  Referrals Placed:  (NA)     Goals:   Goal 1 Statement: MONITOR FOR FEVER AND REPORT   Goal 1 Progression Percent: 0%  Goal 1 Progression Date: 10/10/17     Patient/Caregiver understanding: yes   Frequency of Care Coordination: 1 week   Upcoming appointment: 10/13/17     Plan:   Patient will f/u in clinic with Sha BOLAÑOS on 10/13/17  Patient will continue monitoring for fever and call with fever/new symptoms arising   CCRN will send note to Sha BOLAÑOS regarding financial referral and patient denial   CCRN will mail [facility] care plan and care coordination letter to patient   CCRN will f/u with patient in 1 week     Mariela Moraes Care Coordinator RN  [facility]  [phone]  October 10, 2017

## 2017-10-13 NOTE — Clinical Note
Hi Fadumo,   Patient has not established follow up plan for self with GI, which he was referred to august. Can you help facilitate routine follow up to establish with a hepatologist?  Thanks,  Sha Obrien, PAC

## 2017-10-13 NOTE — PROGRESS NOTES
SUBJECTIVE:   Junior Patricia is a 67 year old male who presents to clinic today for the following health issues:          Hospital Follow-up Visit:    Hospital/Nursing Home/IP Rehab Facility: [facility]  Date of Admission: 10/2/2017  Date of Discharge: 10/6/2017  Reason(s) for Admission: fever, elevated white blood cell count and lactic acidosis            Problems taking medications regularly:  None       Medication changes since discharge: None       Problems adhering to non-medication therapy:  None    Summary of hospitalization:  [facility] discharge summary reviewed  Diagnostic Tests/Treatments reviewed.  Follow up needed: none-however, a clear blood culture was never obtained.   Other Healthcare Providers Involved in Patient s Care:         Specialist appointment - Nephrology-was seen yesterday. see [facility] [place]. provider note not yet completed  Update since discharge: improved. Denies fever or chills. No cough, shortness of breath, chest pain, weakness, abdominal pain, or urinary changes. Still has 3 days of levaquin remaining. No side effects.     Of note, states saw nephrology yesterday. Creatinine was 1.35 and continues to improve. Hemoglobin was improved to 10.8 from 10.5 on discharge.     Post Discharge Medication Reconciliation: discharge medications reconciled, continue medications without change.  Plan of care communicated with patient     Coding guidelines for this visit:  Type of Medical   Decision Making Face-to-Face Visit       within 7 Days of discharge Face-to-Face Visit        within 14 days of discharge   Moderate Complexity [id number]   High Complexity [id number]              Problem list and histories reviewed & adjusted, as indicated.  Additional history: as documented    Patient Active Problem List   Diagnosis     Kidney replaced by transplant     Obesity     Gout     HYPERLIPIDEMIA LDL GOAL <100     CKD (chronic kidney disease) stage 3, GFR 30-59 ml/min      Colon polyps     Hepatitis B infection     Type 2 diabetes mellitus with hemoglobin A1c goal of less than 8.0% (H)     Macrocytosis without anemia     Asthma exacerbation     Colitis     Sepsis (H)     Past Surgical History:   Procedure Laterality Date     KNEE SURGERY       TRANSPLANT KIDNEY RECIPIENT LIVING RELATED      uric acid nephropathy       Social History   Substance Use Topics     Smoking status: Never Smoker     Smokeless tobacco: Never Used     Alcohol use No     History reviewed. No pertinent family history.      Current Outpatient Prescriptions   Medication Sig Dispense Refill     levofloxacin (LEVAQUIN) 500 MG tablet Take 1 tablet (500 mg) by mouth daily 10 tablet 0     cycloSPORINE modified (GENERIC EQUIVALENT) 25 MG capsule Take 25 mg by mouth every evening       predniSONE (DELTASONE) 5 MG tablet Take 5 mg by mouth daily       aspirin 81 MG EC tablet Take 81 mg by mouth daily       diltiazem (DILTIAZEM CD) 120 MG 24 hr capsule Take 120 mg by mouth daily       metoprolol (LOPRESSOR) 25 MG tablet Take 12.5 mg by mouth 2 times daily        mycophenolate (CELLCEPT) 500 MG tablet Take 1,000 mg by mouth 2 times daily        Allergies   Allergen Reactions     Lisinopril Swelling     No Known Drug Allergies      Simvastatin Itching     BP Readings from Last 3 Encounters:   10/13/17 106/69   10/06/17 124/78   10/02/17 130/90    Wt Readings from Last 3 Encounters:   10/13/17 186 lb (84.4 kg)   10/06/17 193 lb 9.6 oz (87.8 kg)   10/02/17 189 lb (85.7 kg)                        Reviewed and updated as needed this visit by clinical staffTobacco  Allergies  Meds  Problems  Med Hx  Surg Hx  Fam Hx  Soc Hx        Reviewed and updated as needed this visit by Provider  Allergies  Meds  Problems         ROS:  Constitutional, HEENT, skin, cardiovascular, pulmonary, gi and gu systems are negative, except as otherwise noted.      OBJECTIVE:   /69 (BP Location: Right arm, Patient Position: Chair, Cuff  Size: Adult Large)  Pulse 71  Temp 98.2  F (36.8  C) (Oral)  Resp 18  Wt 186 lb (84.4 kg)  BMI 36.33 kg/m2  Body mass index is 36.33 kg/(m^2).  GENERAL: alert, no distress and obese  NECK: no adenopathy, no asymmetry, masses, or scars and thyroid normal to palpation  RESP: lungs clear to auscultation - no rales, rhonchi or wheezes  CV: regular rates and rhythm, normal S1 S2, no S3 or S4 and no murmur, click or rub  ABDOMEN: abdominal distension, nontender,no masses and bowel sounds normal  SKIN: no suspicious lesions or rashes  PSYCH: mentation appears normal, affect normal/bright    Diagnostic Test Results:  None-hospital and nephrology labs reviewed.     ASSESSMENT/PLAN:     (A41.51) Sepsis due to Escherichia coli (H)  (primary encounter diagnosis)  Comment: was unclear source in hospital, but was treated appropriately and tolerating PO levaquin well. Labs including cbc and BMP obtained yesterday, which appears stable. However, no blood cultures showing no growth have been obtained since original 10/2 culture. Will obtain these today to verify resolution. If normal, patient to finish levaquin as prescribed and follow up prn for symptoms as well as routine visits.   Plan: Blood culture, Blood culture            (Z94.0) Kidney replaced by transplant  Comment: followed by nephrology. Was seen yesterday. While note is not yet completed, renal function appears improving. Recommending follow up with them as suggested.   Plan:     (N18.3) CKD (chronic kidney disease) stage 3, GFR 30-59 ml/min  Comment: as above   Plan:     (R79.89) Elevated serum creatinine  Comment: as above   Plan:     (B18.1) Chronic viral hepatitis B without delta agent and without coma (H)  Comment: history of this. Has yet to establish with GI. Patient does have chronic anemia for months and has been stable. Possibly due to renal etiology or hepatitis/cirrhosis. Will have referrals aid in contacting patient to facilitate follow up.   Plan:      Follow up: as above     Lenin Obrien PA-C  [facility]

## 2017-10-13 NOTE — NURSING NOTE
Chief Complaint   Patient presents with     Hospital F/U       Initial /69 (BP Location: Right arm, Patient Position: Chair, Cuff Size: Adult Large)  Pulse 71  Temp 98.2  F (36.8  C) (Oral)  Resp 18  Wt 186 lb (84.4 kg)  BMI 36.33 kg/m2 Estimated body mass index is 36.33 kg/(m^2) as calculated from the following:    Height as of 10/3/17: 5' (1.524 m).    Weight as of this encounter: 186 lb (84.4 kg).  Medication Reconciliation: complete        ADALI Moses

## 2017-10-13 NOTE — PATIENT INSTRUCTIONS
Understanding Sepsis  Sepsis is a severe response the body has to an infection. It is most often caused by bacteria. It is also known as septicemia, or systemic inflammatory response syndrome (SIRS). Sepsis is a medical emergency. It needs to be treated right away.  What is sepsis?  Sepsis is when the body reacts to an infection with a severe inflammatory response. It can be caused by bacteria, fungus, or a virus. Sepsis can cause many kinds of problems around the body. It can lead severe low blood pressure (shock) and organ failure. This can lead to death if not treated.  Sepsis is most common in:    Infants and older adults    People with an infection such as pneumonia, meningitis, or a urinary tract infection    People who have an illness such as cancer, AIDS, or diabetes    People being treated with chemotherapy medications or radiation    People who have had a transplant  Symptoms of sepsis  Symptoms of sepsis can include:    Chills and shaking    Rapid heartbeat    Rapid breathing    Shortness of breath    Severe nausea or uncontrolled vomiting    Confusion    Dizziness    Decreased urination    Severe pain, including in the back or joints   Diagnosing sepsis  If your health care provider thinks you may have sepsis, you will be given tests. You may have blood and urine tests. These are done to look for bacteria, viruses, or fungus. You may also have X-rays or other imaging tests. These may be done to look at your organs to locate the source of infection.  Treating sepsis  If you have sepsis, your health care provider will give you antibiotics through a thin, flexible tube put into a vein in your arm (IV). You will also be given fluids through the IV. You may also be given nutrition or other medications through your IV. Your health care provider will talk with you about other treatments you may need. These may include using an oxygen mask or a ventilator to help with breathing. Treatment may last at least 7  to 10 days. Sepsis must be treated in the hospital.  Date Last Reviewed: 7/15/2015    4889-4674 [employer]. [address] [place] [address], [place] [address]. All rights reserved. This information is not intended as a substitute for professional medical care. Always follow your healthcare professional's instructions.

## 2017-10-13 NOTE — LETTER
October 16, 2017      Junior Cameronua  [address] [place] [address]        Dear ,    We are writing to inform you of your test results.    Your test results fall within the expected range(s) or remain unchanged from previous results.  Please continue with current treatment plan.    Resulted Orders   Blood culture   Result Value Ref Range    Specimen Description Blood Right Arm     Special Requests Aerobic and anaerobic bottles received     Culture Micro No growth after 3 days        If you have any questions or concerns, please call the clinic at the number listed above.       Sincerely,        Lenin Obrien PA-C

## 2017-10-13 NOTE — MR AVS SNAPSHOT
After Visit Summary   10/13/2017    Junior Patricia    MRN: [id number]           Patient Information     Date Of Birth          1950        Visit Information        Provider Department      10/13/2017 7:00 AM Lenin Obrien PA-C [facility]        Today's Diagnoses     Sepsis due to Escherichia coli (H)    -  1    Kidney replaced by transplant        CKD (chronic kidney disease) stage 3, GFR 30-59 ml/min        Elevated serum creatinine          Care Instructions      Understanding Sepsis  Sepsis is a severe response the body has to an infection. It is most often caused by bacteria. It is also known as septicemia, or systemic inflammatory response syndrome (SIRS). Sepsis is a medical emergency. It needs to be treated right away.  What is sepsis?  Sepsis is when the body reacts to an infection with a severe inflammatory response. It can be caused by bacteria, fungus, or a virus. Sepsis can cause many kinds of problems around the body. It can lead severe low blood pressure (shock) and organ failure. This can lead to death if not treated.  Sepsis is most common in:    Infants and older adults    People with an infection such as pneumonia, meningitis, or a urinary tract infection    People who have an illness such as cancer, AIDS, or diabetes    People being treated with chemotherapy medications or radiation    People who have had a transplant  Symptoms of sepsis  Symptoms of sepsis can include:    Chills and shaking    Rapid heartbeat    Rapid breathing    Shortness of breath    Severe nausea or uncontrolled vomiting    Confusion    Dizziness    Decreased urination    Severe pain, including in the back or joints   Diagnosing sepsis  If your health care provider thinks you may have sepsis, you will be given tests. You may have blood and urine tests. These are done to look for bacteria, viruses, or fungus. You may also have X-rays or other imaging tests. These may be done to look  at your organs to locate the source of infection.  Treating sepsis  If you have sepsis, your health care provider will give you antibiotics through a thin, flexible tube put into a vein in your arm (IV). You will also be given fluids through the IV. You may also be given nutrition or other medications through your IV. Your health care provider will talk with you about other treatments you may need. These may include using an oxygen mask or a ventilator to help with breathing. Treatment may last at least 7 to 10 days. Sepsis must be treated in the hospital.  Date Last Reviewed: 7/15/2015    5471-3146 [employer]. [address] [place], [place] [id number]. All rights reserved. This information is not intended as a substitute for professional medical care. Always follow your healthcare professional's instructions.                Follow-ups after your visit        Your next 10 appointments already scheduled     Oct 27, 2017  9:00 AM CDT   LAB with CR LAB   [facility])    [address] [address] [phone]           Patient must bring picture ID. Patient should be prepared to give a urine specimen  Please do not eat 10-12 hours before your appointment if you are coming in fasting for labs on lipids, cholesterol, or glucose (sugar). Pregnant women should follow their Care Team instructions. Water with medications is okay. Do not drink coffee or other fluids. If you have concerns about taking  your medications, please ask at office or if scheduling via [employer], send a message by clicking on Secure Messaging, Message Your Care Team.            Oct 27, 2017  9:15 AM CDT   Nurse Only with CR BRONZE MA/LPN   [facility])    [address] [address] [phone]              Who to contact     If you have questions or need follow up information about  "today's clinic visit or your schedule please contact [facility] directly at [phone].  Normal or non-critical lab and imaging results will be communicated to you by MyChart, letter or phone within 4 business days after the clinic has received the results. If you do not hear from us within 7 days, please contact the clinic through [employer] or phone. If you have a critical or abnormal lab result, we will notify you by phone as soon as possible.  Submit refill requests through [employer] or call your pharmacy and they will forward the refill request to us. Please allow 3 business days for your refill to be completed.          Additional Information About Your Visit        [employer] Information     [employer] lets you send messages to your doctor, view your test results, renew your prescriptions, schedule appointments and more. To sign up, go to [email or web address] . Click on \"Log in\" on the left side of the screen, which will take you to the Welcome page. Then click on \"Sign up Now\" on the right side of the page.     You will be asked to enter the access code listed below, as well as some personal information. Please follow the directions to create your username and password.     Your access code is: [email or web address]  Expires: 2017 10:11 AM     Your access code will  in 90 days. If you need help or a new code, please call your [place] clinic or [phone].        Care EveryWhere ID     This is your Care EveryWhere ID. This could be used by other organizations to access your [place] medical records  [id number]        Your Vitals Were     Pulse Temperature Respirations BMI (Body Mass Index)          71 98.2  F (36.8  C) (Oral) 18 36.33 kg/m2         Blood Pressure from Last 3 Encounters:   10/13/17 106/69   10/06/17 124/78   10/02/17 130/90    Weight from Last 3 Encounters:   10/13/17 186 lb (84.4 kg)   10/06/17 193 lb 9.6 oz (87.8 kg)   10/02/17 189 lb (85.7 kg)              We Performed " the Following     Blood culture     Blood culture          Today's Medication Changes          These changes are accurate as of: 10/13/17  7:19 AM.  If you have any questions, ask your nurse or doctor.               These medicines have changed or have updated prescriptions.        Dose/Directions    cycloSPORINE modified 25 MG capsule   Commonly known as:  GENERIC EQUIVALENT   This may have changed:  Another medication with the same name was removed. Continue taking this medication, and follow the directions you see here.        Dose:  25 mg   Take 25 mg by mouth every evening   Refills:  0                Primary Care Provider Office Phone # Fax #    Lenin Wily Obrien PA-C [phone] [address] [facility] [address]        Equal Access to Services     [facility] CenterSHENA : Hadii sherry Abrams, waaxda luliamadaha, qaybta kaalmada flo, rigoberto sears . So [facility] [phone].    ATENCIÓN: Si habla español, tiene a murdock disposición servicios gratuitos de asistencia lingüística. Llame al [phone].    We comply with applicable federal civil rights laws and [place] laws. We do not discriminate on the basis of race, color, national origin, age, disability, sex, sexual orientation, or gender identity.            Thank you!     Thank you for choosing [facility]  for your care. Our goal is always to provide you with excellent care. Hearing back from our patients is one way we can continue to improve our services. Please take a few minutes to complete the written survey that you may receive in the mail after your visit with us. Thank you!             Your Updated Medication List - Protect others around you: Learn how to safely use, store and throw away your medicines at www.disposemymeds.org.          This list is accurate as of: 10/13/17  7:19 AM.  Always use your most recent med list.                   Brand Name Dispense Instructions for  use Diagnosis    aspirin 81 MG EC tablet      Take 81 mg by mouth daily        CELLCEPT 500 MG tablet      Take 1,000 mg by mouth 2 times daily        cycloSPORINE modified 25 MG capsule    GENERIC EQUIVALENT     Take 25 mg by mouth every evening        DILTIAZEM  MG 24 hr capsule   Generic drug:  diltiazem      Take 120 mg by mouth daily        levofloxacin 500 MG tablet    LEVAQUIN    10 tablet    Take 1 tablet (500 mg) by mouth daily    Severe sepsis (H)       metoprolol 25 MG tablet    LOPRESSOR     Take 12.5 mg by mouth 2 times daily        predniSONE 5 MG tablet    DELTASONE     Take 5 mg by mouth daily

## 2017-10-17 NOTE — PROGRESS NOTES
Clinic Care Coordination Contact  [facility]/Voicemail    Referral Source: PCP  Clinical Data: Care Coordinator Outreach - severe sepsis follow up   pcp appt 10/13/17 - blood cultures negative at that visit   Outreach attempted x 1.  Left message on voicemail with call back information and requested return call.  Plan:  Care Coordinator will try to reach patient again in 1-2 business days.    Mariela Moraes Care Coordinator RN  [facility]  [phone]  October 17, 2017

## 2017-10-19 NOTE — PROGRESS NOTES
Clinic Care Coordination Contact  OUTREACH    Referral Information:  Referral Source: PCP  Reason for Contact: f/u sepsis   Care Conference: No     Universal Utilization:   ED Visits in last year: 0  Hospital visits in last year: 2  Last PCP appointment: 10/13/17  Missed Appointments:  (NO CONCERNS )  Concerns: NA  Multiple Providers or Specialists: YES    Clinical Concerns:  Current Medical Concerns: admission for severe sepsis - recent blood cultures taken by pcp negative   Patient states he is doing well - he denies fevers and states he has no symptoms he is fine   Currently in the car on way to another appointment   No questions/concerns at this time     Hx. Kidney transplant - nephrology team [facility]     Current Behavioral Concerns: no concerns     Education Provided to patient: if fever / chills / feeling ill call for appointment right away     Clinical Pathway Name: None    Medication Management:  Completed abx     Functional Status:  Mobility Status: Independent  Equipment Currently Used at Home: none  Transportation: wife  Retired      Psychosocial:  Current living arrangement: I live in a private home with family  Financial/Insurance: CCRN had received referral from pcp regarding      Resources and Interventions:  Current Resources:  (NA);  (NA)  PAS Number:  (NA)  Senior Linkage Line Referral Placed:  (NA)  Advanced Care Plans/Directives on file:: No  Referrals Placed:  (NA)     Goals:   Goal 1 Statement: MONITOR FOR FEVER AND REPORT   Goal 1 Progression Percent: 50%  Goal 1 Progression Date: 10/19/17     Patient/Caregiver understanding: yes   Frequency of Care Coordination: 2 weeks   Upcoming appointment:  (NONE WITH PCP SCHEDULED )     Plan:   Patient to continues to monitor for fever/chills/feeling ill and if symptoms occur he is to call clinic for appt. right away -   Patient to call with questions/concerns   CCRN will f/u with patient in 2 weeks     Mariela Moraes Care Coordinator DIMITRY Dugan  [facility]  [phone]  October 19, 2017

## 2017-10-25 PROBLEM — K74.60 CIRRHOSIS OF LIVER WITH ASCITES, UNSPECIFIED HEPATIC CIRRHOSIS TYPE (H): Status: ACTIVE | Noted: 2017-01-01

## 2017-10-25 PROBLEM — R18.8 CIRRHOSIS OF LIVER WITH ASCITES, UNSPECIFIED HEPATIC CIRRHOSIS TYPE (H): Status: ACTIVE | Noted: 2017-01-01

## 2017-10-25 NOTE — TELEPHONE ENCOUNTER
Daughter called,    Requesting ZB to order a outpatient paracentesis as patient reported feeling bloated and noticing increased abdominal girth. No pain. No nausea. No fever.     Patient also reported to her that he has swelling in BLE. Also told her he has some shortness of breath.     Daughter advised patient should be seen in the ER. She declined stating patient is very stubborn and would rather see MD first.    OV scheduled with PCP today per daughter request.     Marielena GARCIA RN, BSN, PHN  [place] [facility]

## 2017-10-25 NOTE — TELEPHONE ENCOUNTER
Bmp stable. spironolactone and lasix sent.     Also liver function is slightly worse than hospitalization, but no evidence of severe acute hepatitis. Our plan does not change.     CBC stable as well.     -Sha Obrien, PAC

## 2017-10-25 NOTE — TELEPHONE ENCOUNTER
Daughter Néstor calls. No CTC.   We obtained verbal OK from the patient to discuss with daughter Néstor.    What are BMP lab results?  And why are water pills prescriptions not at pharmacy?  Informed BMP results are not in and per Sha's visit note:     Will instead likely start spironolactone with lasix. Likely low dose due to renal transplant history and CKD. However, prior to starting this with subjective history of decreased urine output, will obtain stat BMP to assess renal function. Will send meds pending results  Néstor pressed for time when results in. Informed ordered STAT but we cannot with certainty guarantee a time results will be posted.  Mor Martin RN

## 2017-10-25 NOTE — NURSING NOTE
Chief Complaint   Patient presents with     Edema       Initial /77 (BP Location: Right arm, Patient Position: Chair, Cuff Size: Adult Regular)  Pulse 67  Temp 98.4  F (36.9  C) (Oral)  Resp 16  Wt 194 lb (88 kg)  BMI 37.89 kg/m2 Estimated body mass index is 37.89 kg/(m^2) as calculated from the following:    Height as of 10/3/17: 5' (1.524 m).    Weight as of this encounter: 194 lb (88 kg).  Medication Reconciliation: complete

## 2017-10-25 NOTE — PROGRESS NOTES
SUBJECTIVE:   Junior Patricia is a 67 year old male who presents to clinic today for the following health issues:    Edema - increasing. Since being discharge from hospital, has noticed increased swelling in his ankles to his knees bilaterally as well as around his abdomen. No pain. No shortness of breath or fever/chills. Denies chest pain or palpitations. No increased fatigue.     Of note, does admit to decreased urine output, however unable to state how many times a day he is urinating as well as how much with each urination. No pain with urination. Patient is s/p renal transplant.     Also, still has yet to schedule evaluation by GI for his hepatitis B. Most recent CT showed evidence of cirrhosis and while in hospital earlier this month, paracentesis was performed leading to 1750 mL of fluid. Analysis was negative for infection.     Problem list and histories reviewed & adjusted, as indicated.  Additional history: as documented    Patient Active Problem List   Diagnosis     Kidney replaced by transplant     Obesity     Gout     HYPERLIPIDEMIA LDL GOAL <100     CKD (chronic kidney disease) stage 3, GFR 30-59 ml/min     Colon polyps     Hepatitis B infection     Type 2 diabetes mellitus with hemoglobin A1c goal of less than 8.0% (H)     Macrocytosis without anemia     Asthma exacerbation     Colitis     Sepsis (H)     Cirrhosis of liver with ascites, unspecified hepatic cirrhosis type (H)     Past Surgical History:   Procedure Laterality Date     KNEE SURGERY       TRANSPLANT KIDNEY RECIPIENT LIVING RELATED      uric acid nephropathy       Social History   Substance Use Topics     Smoking status: Never Smoker     Smokeless tobacco: Never Used     Alcohol use No     History reviewed. No pertinent family history.      Current Outpatient Prescriptions   Medication Sig Dispense Refill     cycloSPORINE modified (GENERIC EQUIVALENT) 25 MG capsule Take 25 mg by mouth every evening       predniSONE (DELTASONE) 5 MG tablet  Take 5 mg by mouth daily       aspirin 81 MG EC tablet Take 81 mg by mouth daily       diltiazem (DILTIAZEM CD) 120 MG 24 hr capsule Take 120 mg by mouth daily       metoprolol (LOPRESSOR) 25 MG tablet Take 12.5 mg by mouth 2 times daily        mycophenolate (CELLCEPT) 500 MG tablet Take 1,000 mg by mouth 2 times daily        Allergies   Allergen Reactions     Lisinopril Swelling     No Known Drug Allergies      Simvastatin Itching     BP Readings from Last 3 Encounters:   10/25/17 126/77   10/13/17 106/69   10/06/17 124/78    Wt Readings from Last 3 Encounters:   10/25/17 194 lb (88 kg)   10/13/17 186 lb (84.4 kg)   10/06/17 193 lb 9.6 oz (87.8 kg)                        Reviewed and updated as needed this visit by clinical staffTobacco  Allergies  Meds  Med Hx  Surg Hx  Fam Hx  Soc Hx      Reviewed and updated as needed this visit by Provider         ROS:  Constitutional, HEENT, skin, neuro, cardiovascular, pulmonary, gi and gu systems are negative, except as otherwise noted.      OBJECTIVE:   /77 (BP Location: Right arm, Patient Position: Chair, Cuff Size: Adult Regular)  Pulse 67  Temp 98.4  F (36.9  C) (Oral)  Resp 16  Wt 194 lb (88 kg)  BMI 37.89 kg/m2  Body mass index is 37.89 kg/(m^2).  GENERAL: alert and no distress  RESP: lungs clear to auscultation - no rales, rhonchi or wheezes  CV: regular rates and rhythm, normal S1 S2. no click or rub and 2+ bilateral lower extremity pitting edema to knees    ABDOMEN: ascites present with positive fluid wave. Mildly enlarged hepatic borders. No obvious splenomegaly. No tenderness to palpation. No warmth.   SKIN: no suspicious lesions or rashes  NEURO: Normal strength and tone, mentation intact and speech normal  PSYCH: mentation appears normal, affect normal/bright    Diagnostic Test Results:  none     ASSESSMENT/PLAN:     (K74.60) Cirrhosis of liver with ascites, unspecified hepatic cirrhosis type (H)  (primary encounter diagnosis)  Comment: evident  on history and exam along with CT evidence. Likely secondary to hep B history. Again, discussed importance of seeing hepatology. Will have see Fadumo to help facilate follow up in the next 1-2 weeks. No evidence of SBP at this time, however if any pain develops or fever/chills, patient to go to ER. Given no discomfort at this time, therapuetic paracentesis is not felt necessary due to unnecessary infectious risks, however, if shortness of breath or discomfort develop, patient to call and this will be ordered. Will instead likely start spironolactone with lasix. Likely low dose due to renal transplant history and CKD. However, prior to starting this with subjective history of decreased urine output, will obtain stat BMP to assess renal function. Will send meds pending results. INR and hepatic panel also obtained. All above was relayed to patient's daughter over the phone at the verbal consent of patient.   Plan: Hepatic panel (Albumin, ALT, AST, Bili, Alk         Phos, TP), CBC with platelets and differential,        INR, spironolactone (ALDACTONE) 50 MG tablet,         furosemide (LASIX) 20 MG tablet        -Medication use and side effects discussed with the patient. Patient is in complete understanding and agreement with plan.       (N18.3) CKD (chronic kidney disease) stage 3, GFR 30-59 ml/min  Comment: as above   Plan: Hepatic panel (Albumin, ALT, AST, Bili, Alk         Phos, TP), CBC with platelets and differential,        Basic metabolic panel            (B18.1) Chronic viral hepatitis B without delta agent and without coma (H)  Comment: as above   Plan: Hepatic panel (Albumin, ALT, AST, Bili, Alk         Phos, TP), CBC with platelets and differential,        Basic metabolic panel            (Z94.0) Kidney replaced by transplant  Comment: as above   Plan:     Follow up: per GI. ER if pain or fever develop.     Lenin Obrien PA-C  [facility]

## 2017-10-25 NOTE — PATIENT INSTRUCTIONS
Cirrhosis    The liver is found on the right side of your belly (abdomen). It is just below the rib cage. The liver has many important jobs. It removes toxins from the blood. It also helps your blood clot to stop bleeding. Cirrhosis happens when the liver is scarred or injured. This damage is permanent. It can cause your liver to stop working (liver failure).   The most common causes of cirrhosis are long-term heavy alcohol use and having hepatitis B or C. Other causes include nonalcoholic steatohepatitis (MELENDEZ or fatty liver disease), hemochromatosis, toxins, certain medicines, and certain viruses.  Common symptoms of cirrhosis include:    Tiredness or weakness    Loss of appetite    Nausea and vomiting    Easy bleeding and bruising    Swelling of the belly (abdomen)    Weight loss    Yellowing of the eyes or skin (jaundice)    Itching    Confusion  Treatment helps ease symptoms and prevent more liver damage. You may also get treatment to fight the hepatitis virus. Quitting alcohol will help slow down the disease getting worse. It may also prevent more complications. If cirrhosis gets worse and becomes life threatening, you may need a liver transplant.   Home care    Don't take medicines that can make liver damage worse. Your healthcare provider will tell you if any of the medicines you now take need to be changed. Talk with your provider or pharmacist before taking any medicine not prescribed. These include dietary supplements and herbs. Some of these may make liver damage worse.    Talk with your healthcare provider about medicines that have acetaminophen or NSAIDs such as ibuprofen and naproxen. These can also harm your liver.     Stop drinking alcohol. If you find it hard to stop drinking, seek professional help. Consider joining Alcoholics Anonymous or another type of treatment program for support.    If you use IV drugs, you are at high risk for hepatitis B and C. Seek help to stop.     Be sure to ask your  healthcare provider about recommended vaccines. These include vaccines for viruses that can cause liver disease.  Follow-up care  Follow up with your healthcare provider, or as advised.  For more information and to learn about support groups for people with liver disease, contact:    American Liver Foundation, www.liverfoundation.org, 303.285.8515    Hepatitis Foundation International, www.hepfi.org, 972.435.7806  When to seek medical advice  Call your healthcare provider right away if you have any of the following:    Rapid weight gain with increased size of your belly (abdomen) or leg swelling    Yellow color of your skin or eyes (jaundice) gets worse    Excess bleeding from cuts or injuries  Date Last Reviewed: 6/1/2017 2000-2017 [employer]. [address] [place] [address], [place] [address]. All rights reserved. This information is not intended as a substitute for professional medical care. Always follow your healthcare professional's instructions.        Ascites    Ascites (pronounced jj-kdwd-lkdk) is fluid collecting in the abdomen (stomach area). Symptoms include swelling of the abdomen and a feeling of pressure. Shortness of breath may also occur. In severe cases, the feet, ankles and legs may also swell.   There are many causes of ascites. The most common are related to the liver. They include:    Cirrhosis of the liver, which may be due to hepatitis B, hepatitis C, long-term alcohol abuse, nonalcoholic steatohepatitis (MELENDEZ) and others    Diseases such as heart failure, kidney failure, pancreatitis, or cancer  To treat the condition, a low-salt diet may be recommended. Medicines that help fluid leave the body (diuretics) may be prescribed. In some cases, a procedure is done to drain the abdomen of fluid. This is called paracentesis. Unless the underlying cause is treated, the fluid is likely to return.  If liver damage is due to alcohol, stopping all alcohol will help slow the progress of the  disease. If liver damage is from hepatitis B or C, treatments may be given to fight the virus. If liver damage becomes life threatening, a liver transplant may be needed.  Home care    Certain medicines can worsen liver damage. Talk to your healthcare provider or pharmacist about any medicines you currently take. Ask your healthcare provider or pharmacist before taking any new medicines. Also ask before taking herbs, vitamins, or minerals. Certain ones affect the liver.    Do not taking acetaminophen or ibuprofen without taking to your healthcare provider first. Both can affect your liver.     Stop all alcohol use. If you abuse alcohol, talk to your healthcare provider about getting help and support to stop.     If you use IV drugs, seek help to stop. Never share needles or other equipment.      If you have cirrhosis from diabetes, obesity, or metabolic syndrome (associated with non-alcoholic steatohepatitis), treatment of the underlying condition is critical. So is exercise and weight loss.  Follow-up care  Follow up with your healthcare provider as advised. If a culture was done on the ascitic fluid, or imaging, or other labs were done, call as directed for the results. Depending on the results, your treatment may change.  The following sources can tell you more about ascites and help you find support.    American Liver Foundation 738-439-6690 www.liverfoundation.org    Hepatitis Foundation International www.hepfi.org    Alcoholics Anonymous www.aa.org    National [place] on Alcoholism and Drug Dependence 818-555-2615 www.ncadd.org  When to seek medical advice  Call your healthcare provider right away if you have any of the following:    Sudden weight gain with increased size of your abdomen or leg swelling    Increasing jaundice (yellowing of skin or eyes)    Excess bleeding from cuts or injuries    Blood in vomit or stool (black or red color)    Trouble breathing    Increasing abdominal  pain    Confusion    Fever of 100.4 F (38 C) or higher, or as directed by your healthcare provider  Date Last Reviewed: 6/1/2017 2000-2017 [employer]. [address] [place] [address], [place] [address]. All rights reserved. This information is not intended as a substitute for professional medical care. Always follow your healthcare professional's instructions.

## 2017-10-25 NOTE — MR AVS SNAPSHOT
After Visit Summary   10/25/2017    Junior Patricia    MRN: [id number]           Patient Information     Date Of Birth          1950        Visit Information        Provider Department      10/25/2017 10:45 AM Open, Assignments; Lenin Obrien PA-C [facility]        Today's Diagnoses     CKD (chronic kidney disease) stage 3, GFR 30-59 ml/min    -  1    Cirrhosis of liver with ascites, unspecified hepatic cirrhosis type (H)        Chronic viral hepatitis B without delta agent and without coma (H)        Kidney replaced by transplant          Care Instructions      Cirrhosis    The liver is found on the right side of your belly (abdomen). It is just below the rib cage. The liver has many important jobs. It removes toxins from the blood. It also helps your blood clot to stop bleeding. Cirrhosis happens when the liver is scarred or injured. This damage is permanent. It can cause your liver to stop working (liver failure).   The most common causes of cirrhosis are long-term heavy alcohol use and having hepatitis B or C. Other causes include nonalcoholic steatohepatitis (MELENDEZ or fatty liver disease), hemochromatosis, toxins, certain medicines, and certain viruses.  Common symptoms of cirrhosis include:    Tiredness or weakness    Loss of appetite    Nausea and vomiting    Easy bleeding and bruising    Swelling of the belly (abdomen)    Weight loss    Yellowing of the eyes or skin (jaundice)    Itching    Confusion  Treatment helps ease symptoms and prevent more liver damage. You may also get treatment to fight the hepatitis virus. Quitting alcohol will help slow down the disease getting worse. It may also prevent more complications. If cirrhosis gets worse and becomes life threatening, you may need a liver transplant.   Home care    Don't take medicines that can make liver damage worse. Your healthcare provider will tell you if any of the medicines you now take need to be  changed. Talk with your provider or pharmacist before taking any medicine not prescribed. These include dietary supplements and herbs. Some of these may make liver damage worse.    Talk with your healthcare provider about medicines that have acetaminophen or NSAIDs such as ibuprofen and naproxen. These can also harm your liver.     Stop drinking alcohol. If you find it hard to stop drinking, seek professional help. Consider joining Alcoholics Anonymous or another type of treatment program for support.    If you use IV drugs, you are at high risk for hepatitis B and C. Seek help to stop.     Be sure to ask your healthcare provider about recommended vaccines. These include vaccines for viruses that can cause liver disease.  Follow-up care  Follow up with your healthcare provider, or as advised.  For more information and to learn about support groups for people with liver disease, contact:    American Liver Foundation, www.liverfoundation.org, 800.428.6501    Hepatitis Foundation International, www.hepfi.org, 671.976.9303  When to seek medical advice  Call your healthcare provider right away if you have any of the following:    Rapid weight gain with increased size of your belly (abdomen) or leg swelling    Yellow color of your skin or eyes (jaundice) gets worse    Excess bleeding from cuts or injuries  Date Last Reviewed: 6/1/2017 2000-2017 [employer]. [address] [place] [address], [place] [address]. All rights reserved. This information is not intended as a substitute for professional medical care. Always follow your healthcare professional's instructions.        Ascites    Ascites (pronounced jc-yxba-zuwk) is fluid collecting in the abdomen (stomach area). Symptoms include swelling of the abdomen and a feeling of pressure. Shortness of breath may also occur. In severe cases, the feet, ankles and legs may also swell.   There are many causes of ascites. The most common are related to the liver. They  include:    Cirrhosis of the liver, which may be due to hepatitis B, hepatitis C, long-term alcohol abuse, nonalcoholic steatohepatitis (MELENDEZ) and others    Diseases such as heart failure, kidney failure, pancreatitis, or cancer  To treat the condition, a low-salt diet may be recommended. Medicines that help fluid leave the body (diuretics) may be prescribed. In some cases, a procedure is done to drain the abdomen of fluid. This is called paracentesis. Unless the underlying cause is treated, the fluid is likely to return.  If liver damage is due to alcohol, stopping all alcohol will help slow the progress of the disease. If liver damage is from hepatitis B or C, treatments may be given to fight the virus. If liver damage becomes life threatening, a liver transplant may be needed.  Home care    Certain medicines can worsen liver damage. Talk to your healthcare provider or pharmacist about any medicines you currently take. Ask your healthcare provider or pharmacist before taking any new medicines. Also ask before taking herbs, vitamins, or minerals. Certain ones affect the liver.    Do not taking acetaminophen or ibuprofen without taking to your healthcare provider first. Both can affect your liver.     Stop all alcohol use. If you abuse alcohol, talk to your healthcare provider about getting help and support to stop.     If you use IV drugs, seek help to stop. Never share needles or other equipment.      If you have cirrhosis from diabetes, obesity, or metabolic syndrome (associated with non-alcoholic steatohepatitis), treatment of the underlying condition is critical. So is exercise and weight loss.  Follow-up care  Follow up with your healthcare provider as advised. If a culture was done on the ascitic fluid, or imaging, or other labs were done, call as directed for the results. Depending on the results, your treatment may change.  The following sources can tell you more about ascites and help you find  support.    American Liver Foundation 696-972-6737 www.liverfoundation.org    Hepatitis Foundation International www.hepfi.org    Alcoholics Anonymous www.aa.org    National [place] on Alcoholism and Drug Dependence 727-258-8886 www.ncadd.org  When to seek medical advice  Call your healthcare provider right away if you have any of the following:    Sudden weight gain with increased size of your abdomen or leg swelling    Increasing jaundice (yellowing of skin or eyes)    Excess bleeding from cuts or injuries    Blood in vomit or stool (black or red color)    Trouble breathing    Increasing abdominal pain    Confusion    Fever of 100.4 F (38 C) or higher, or as directed by your healthcare provider  Date Last Reviewed: 6/1/2017 2000-2017 [employer]. [address] [place] [address]. All rights reserved. This information is not intended as a substitute for professional medical care. Always follow your healthcare professional's instructions.                Follow-ups after your visit        Your next 10 appointments already scheduled     Oct 27, 2017  7:15 AM CDT   [facility] with Lenin Obrien PA-C   [facility]    [address] [place] [id number] [phone]           Bring a current list of meds and any records pertaining to this visit. For Physicals, please bring immunization records and any forms needing to be filled out. Please arrive 10 minutes early to complete paperwork.              Who to contact     If you have questions or need follow up information about today's clinic visit or your schedule please contact [facility] directly at [phone].  Normal or non-critical lab and imaging results will be communicated to you by MyChart, letter or phone within 4 business days after the clinic has received the results. If you do not hear from us within 7 days, please contact the clinic  "through [employer] or phone. If you have a critical or abnormal lab result, we will notify you by phone as soon as possible.  Submit refill requests through [employer] or call your pharmacy and they will forward the refill request to us. Please allow 3 business days for your refill to be completed.          Additional Information About Your Visit        [employer] Information     [employer] lets you send messages to your doctor, view your test results, renew your prescriptions, schedule appointments and more. To sign up, go to [email or web address] . Click on \"Log in\" on the left side of the screen, which will take you to the Welcome page. Then click on \"Sign up Now\" on the right side of the page.     You will be asked to enter the access code listed below, as well as some personal information. Please follow the directions to create your username and password.     Your access code is: [email or web address]  Expires: 2017 10:11 AM     Your access code will  in 90 days. If you need help or a new code, please call your [facility] clinic or [phone].        Care EveryWhere ID     This is your Care EveryWhere ID. This could be used by other organizations to access your [facility] medical records  [id number]        Your Vitals Were     Pulse Temperature Respirations BMI (Body Mass Index)          67 98.4  F (36.9  C) (Oral) 16 37.89 kg/m2         Blood Pressure from Last 3 Encounters:   10/25/17 126/77   10/13/17 106/69   10/06/17 124/78    Weight from Last 3 Encounters:   10/25/17 194 lb (88 kg)   10/13/17 186 lb (84.4 kg)   10/06/17 193 lb 9.6 oz (87.8 kg)              We Performed the Following     Basic metabolic panel     CBC with platelets and differential     Hepatic panel (Albumin, ALT, AST, Bili, Alk Phos, TP)     INR        Primary Care Provider Office Phone # Fax #    Lenin Wily Obrien PA-C [phone] [address] [facility] [address]        Equal Access to Services     [place] R[place] AH: " Hadii sherry wells Sosommerali, waaxda luqadaha, qaybta kaalmada flo, rigoberto carolynein hayaasu magallonelizabeth valdez lapillosu sheridan. So [facility] [phone].    ATENCIÓN: Si habla kay, tiene a murdock disposición servicios gratuitos de asistencia lingüística. Llame al [phone].    We comply with applicable federal civil rights laws and [place] laws. We do not discriminate on the basis of race, color, national origin, age, disability, sex, sexual orientation, or gender identity.            Thank you!     Thank you for choosing [facility]  for your care. Our goal is always to provide you with excellent care. Hearing back from our patients is one way we can continue to improve our services. Please take a few minutes to complete the written survey that you may receive in the mail after your visit with us. Thank you!             Your Updated Medication List - Protect others around you: Learn how to safely use, store and throw away your medicines at www.disposemymeds.org.          This list is accurate as of: 10/25/17 10:57 AM.  Always use your most recent med list.                   Brand Name Dispense Instructions for use Diagnosis    aspirin 81 MG EC tablet      Take 81 mg by mouth daily        CELLCEPT 500 MG tablet      Take 1,000 mg by mouth 2 times daily        cycloSPORINE modified 25 MG capsule    GENERIC EQUIVALENT     Take 25 mg by mouth every evening        DILTIAZEM  MG 24 hr capsule   Generic drug:  diltiazem      Take 120 mg by mouth daily        metoprolol 25 MG tablet    LOPRESSOR     Take 12.5 mg by mouth 2 times daily        predniSONE 5 MG tablet    DELTASONE     Take 5 mg by mouth daily

## 2017-10-25 NOTE — TELEPHONE ENCOUNTER
Daughter Néstor informed of results. No further questions at this time.     Marielena GARCIA RN, BSN, PHN  [place] [facility] RN

## 2017-10-26 NOTE — TELEPHONE ENCOUNTER
Originally pt had lab only for transplant labs on 10-17, appt got changed to see PCP  But saw PCP 10-25,  Daughter wondering if pt still needs labs or not, and if he needs to see ZB again (schedule appt 10-27)    CB# [phone] OK to LM    Route to provider to review and advise    Tito RN Nurse Triage

## 2017-10-26 NOTE — TELEPHONE ENCOUNTER
It depends on what labs are needed from the transplant clinic. There does not appear to be any orders in his chart futured. These likely would be outside orders. If the labs I obtained are all the same as the transplant clinic, this can be cancelled. Otherwise, if further lab work is needed, a lab only will be needed per the transplant clinic. Tomorrow's visit is not needed.     -Sha Obrien, PAC

## 2017-10-26 NOTE — TELEPHONE ENCOUNTER
Left detailed message on Néstor LINN.    Canceled tomorrow's office visit.    Marielena GARCIA RN, BSN, PHN  [place] [facility] RN

## 2017-10-27 NOTE — PROGRESS NOTES
Junior Patricia is a 67 year old male who comes in today for a Blood Pressure check because of Patient was unsure.    *Document pulse and BP  *Use new set of vitals button for multiple readings.  *Use extended vitals for orthostatic    Vitals as recorded, a large cuff was used.    Patient is taking medication as prescribed  Patient is tolerating medications well.  Patient is not monitoring Blood Pressure at home.      Current complaints: none    Disposition: results routed to MD/AP

## 2017-10-27 NOTE — MR AVS SNAPSHOT
After Visit Summary   10/27/2017    Junior Patricia    MRN: [id number]           Patient Information     Date Of Birth          1950        Visit Information        Provider Department      10/27/2017 8:00 AM CR SILVER MA/LPN [facility]        Today's Diagnoses     Blood pressure check    -  1       Follow-ups after your visit        Your next 10 appointments already scheduled     Oct 27, 2017  8:00 AM CDT   Nurse Only with CR SILVER MA/LPN   [facility]    [address] [facility] [address] [phone]            Oct 27, 2017  8:15 AM CDT   LAB with CR LAB   [facility]    [address] [facility] [address] [phone]           Patient must bring picture ID. Patient should be prepared to give a urine specimen  Please do not eat 10-12 hours before your appointment if you are coming in fasting for labs on lipids, cholesterol, or glucose (sugar). Pregnant women should follow their Care Team instructions. Water with medications is okay. Do not drink coffee or other fluids. If you have concerns about taking  your medications, please ask at office or if scheduling via Memolane, send a message by clicking on Secure Messaging, Message Your Care Team.              Who to contact     If you have questions or need follow up information about today's clinic visit or your schedule please contact [facility] directly at [phone].  Normal or non-critical lab and imaging results will be communicated to you by DeNAhart, letter or phone within 4 business days after the clinic has received the results. If you do not hear from us within 7 days, please contact the clinic through [employer] or phone. If you have a critical or abnormal lab result, we will notify you by phone as soon as possible.  Submit refill requests through Memolane or call  "your pharmacy and they will forward the refill request to us. Please allow 3 business days for your refill to be completed.          Additional Information About Your Visit        MyChart Information     [employer] lets you send messages to your doctor, view your test results, renew your prescriptions, schedule appointments and more. To sign up, go to [email or web address] . Click on \"Log in\" on the left side of the screen, which will take you to the Welcome page. Then click on \"Sign up Now\" on the right side of the page.     You will be asked to enter the access code listed below, as well as some personal information. Please follow the directions to create your username and password.     Your access code is: [email or web address]  Expires: 2017 10:11 AM     Your access code will  in 90 days. If you need help or a new code, please call your [facility] clinic or [phone].        Care EveryWhere ID     This is your Care EveryWhere ID. This could be used by other organizations to access your [facility] medical records  [id number]        Your Vitals Were     Pulse                   60            Blood Pressure from Last 3 Encounters:   10/27/17 115/78   10/25/17 126/77   10/13/17 106/69    Weight from Last 3 Encounters:   10/25/17 194 lb (88 kg)   10/13/17 186 lb (84.4 kg)   10/06/17 193 lb 9.6 oz (87.8 kg)              Today, you had the following     No orders found for display       Primary Care Provider Office Phone # Fax #    Lenin Wily Obrien PA-C [phone] [address] [facility] [address]        Equal Access to Services     KING RAND : Hadbony Abrams, anjelica castro, rigoberto king. So [facility] [phone].    ATENCIÓN: Si habla español, tiene a murdock disposición servicios gratuitos de asistencia lingüística. Llame al [phone].    We comply with applicable federal civil rights laws and [place] laws. We do " not discriminate on the basis of race, color, national origin, age, disability, sex, sexual orientation, or gender identity.            Thank you!     Thank you for choosing [facility]  for your care. Our goal is always to provide you with excellent care. Hearing back from our patients is one way we can continue to improve our services. Please take a few minutes to complete the written survey that you may receive in the mail after your visit with us. Thank you!             Your Updated Medication List - Protect others around you: Learn how to safely use, store and throw away your medicines at www.disposemymeds.org.          This list is accurate as of: 10/27/17  7:33 AM.  Always use your most recent med list.                   Brand Name Dispense Instructions for use Diagnosis    aspirin 81 MG EC tablet      Take 81 mg by mouth daily        CELLCEPT 500 MG tablet      Take 1,000 mg by mouth 2 times daily        cycloSPORINE modified 25 MG capsule    GENERIC EQUIVALENT     Take 25 mg by mouth every evening        DILTIAZEM  MG 24 hr capsule   Generic drug:  diltiazem      Take 120 mg by mouth daily        furosemide 20 MG tablet    LASIX    30 tablet    Take 1 tablet (20 mg) by mouth daily    Cirrhosis of liver with ascites, unspecified hepatic cirrhosis type (H)       metoprolol 25 MG tablet    LOPRESSOR     Take 12.5 mg by mouth 2 times daily        predniSONE 5 MG tablet    DELTASONE     Take 5 mg by mouth daily        spironolactone 50 MG tablet    ALDACTONE    30 tablet    Take 1 tablet (50 mg) by mouth daily    Cirrhosis of liver with ascites, unspecified hepatic cirrhosis type (H)

## 2017-12-29 NOTE — LETTER
[address] [address]  [phone]  December 29, 2017    Junior Jasso Harshad  [address] [place] [address]        Dear Junior,    I care about your health and have reviewed your health plan. I have reviewed your medical conditions, medication list, and lab results and am making recommendations based on this review, to better manage your health.    You are in particular need of attention regarding:  -Asthma  -Colon Cancer Screening    I am recommending that you:  {recommendations:-schedule a COLONOSCOPY to look for colon cancer (due every 10 years or 5 years in higher risk situations.)   Colon cancer is now the second leading cause of death in the [place] for both men and women and there are over 130,000 new cases and 50,000 deaths per year from colon cancer.  Colonoscopies can prevent 90-95% of these deaths.  Problem lesions can be removed before they ever become cancer.  This test is not only looking for cancer, but also getting rid of precancerious lesions.  If you do not wish to do a colonoscopy or cannot afford to do one, at this time, there is another option. It is called a FIT test or Fecal Immunochemical Occult Blood Test (take home stool sample kit).  It does not replace the colonoscopy for colorectal cancer screening, but it can detect hidden bleeding in the lower colon.  It does need to be repeated every year and if a positive result is obtained, you would be referred for a colonoscopy.  If you have completed either one of these tests at another facility, please have the records sent to our clinic so that we can best coordinate your care.   -Complete and return the attached ASTHMA CONTROL TEST.  If your total score is 19 or less or you have been to the ER or urgent care for your asthma, then please schedule an asthma followup appointment.    Please call us at [phone] (or use [employer]ars) to address the above  recommendations.     Thank you for trusting [facility] and we appreciate the opportunity to serve you.  We look forward to supporting your healthcare needs in the future.    Healthy Regards,    Lenin Obrien PA-C

## 2017-12-29 NOTE — TELEPHONE ENCOUNTER
Panel Management Review      Patient has the following on his problem list:     Asthma review     ACT Total Scores 5/17/2017   ACT TOTAL SCORE (Goal Greater than or Equal to 20) 5   In the past 12 months, how many times did you visit the emergency room for your asthma without being admitted to the hospital? 0   In the past 12 months, how many times were you hospitalized overnight because of your asthma? 0      1. Is Asthma diagnosis on the Problem List? Yes    2. Is Asthma listed on Health Maintenance? Yes    3. Patient is due for:  ACT    Diabetes    ASA: Passed    Last A1C  Lab Results   Component Value Date    A1C 5.9 07/28/2017    A1C 7.0 04/05/2017    A1C 6.7 08/04/2014    A1C 6.8 05/02/2012    A1C 6.3 03/15/2006     A1C tested: Passed    Last LDL:    Lab Results   Component Value Date    CHOL 186 05/10/2011     Lab Results   Component Value Date    HDL 40 05/10/2011     Lab Results   Component Value Date    LDL 66 04/05/2017     05/10/2011     Lab Results   Component Value Date    TRIG 137 05/10/2011     Lab Results   Component Value Date    CHOLHDLRATIO 4.7 05/10/2011     No results found for: NHDL    Is the patient on a Statin? NO             Is the patient on Aspirin? YES    Medications     Salicylates    aspirin 81 MG EC tablet          Last three blood pressure readings:  BP Readings from Last 3 Encounters:   10/27/17 115/78   10/25/17 126/77   10/13/17 106/69          Tobacco History:     History   Smoking Status     Never Smoker   Smokeless Tobacco     Never Used               Composite cancer screening  Chart review shows that this patient is due/due soon for the following Colonoscopy  Summary:    Patient is due/failing the following:   ACT and COLONOSCOPY    Action needed:   Patient needs to do ACT. and Patient needs referral/order: colonoscopy or FIT test    Type of outreach:    Sent letter.    Questions for provider review:    None                                                                                       ADALI Moses       Chart routed to Care Team .

## 2018-01-01 ENCOUNTER — AMBULATORY - HEALTHEAST (OUTPATIENT)
Dept: SCHEDULING | Facility: CLINIC | Age: 68
End: 2018-01-01

## 2018-01-01 ENCOUNTER — HOSPITAL ENCOUNTER (OUTPATIENT)
Dept: ULTRASOUND IMAGING | Facility: HOSPITAL | Age: 68
Discharge: HOME | End: 2018-08-28
Admitting: RADIOLOGY

## 2018-01-01 ENCOUNTER — COMMUNICATION - HEALTHEAST (OUTPATIENT)
Dept: ENDOCRINOLOGY | Facility: CLINIC | Age: 68
End: 2018-01-01

## 2018-01-01 ENCOUNTER — HOSPITAL ENCOUNTER (OUTPATIENT)
Dept: ULTRASOUND IMAGING | Facility: HOSPITAL | Age: 68
Discharge: HOME | End: 2018-08-07
Admitting: RADIOLOGY

## 2018-01-01 ENCOUNTER — HOSPITAL ENCOUNTER (OUTPATIENT)
Dept: ULTRASOUND IMAGING | Facility: HOSPITAL | Age: 68
Discharge: HOME | End: 2018-08-14
Attending: INTERPRETER | Admitting: RADIOLOGY

## 2018-01-01 ENCOUNTER — HOSPITAL ENCOUNTER (OUTPATIENT)
Dept: ULTRASOUND IMAGING | Facility: CLINIC | Age: 68
End: 2018-08-27
Attending: INTERNAL MEDICINE
Payer: MEDICARE

## 2018-01-01 ENCOUNTER — OFFICE VISIT - HEALTHEAST (OUTPATIENT)
Dept: INTERPRETER SERVICES | Facility: CLINIC | Age: 68
End: 2018-01-01

## 2018-01-01 ENCOUNTER — TELEPHONE (OUTPATIENT)
Dept: TRANSPLANT | Facility: CLINIC | Age: 68
End: 2018-01-01

## 2018-01-01 ENCOUNTER — CARE COORDINATION (OUTPATIENT)
Dept: CARE COORDINATION | Facility: CLINIC | Age: 68
End: 2018-01-01

## 2018-01-01 ENCOUNTER — DOCUMENTATION ONLY (OUTPATIENT)
Dept: TRANSPLANT | Facility: CLINIC | Age: 68
End: 2018-01-01

## 2018-01-01 ENCOUNTER — COMMUNICATION - HEALTHEAST (OUTPATIENT)
Dept: FAMILY MEDICINE | Facility: CLINIC | Age: 68
End: 2018-01-01

## 2018-01-01 ENCOUNTER — APPOINTMENT (OUTPATIENT)
Dept: TRANSPLANT | Facility: CLINIC | Age: 68
End: 2018-01-01
Attending: FAMILY MEDICINE
Payer: MEDICARE

## 2018-01-01 ENCOUNTER — RECORDS - HEALTHEAST (OUTPATIENT)
Dept: ADMINISTRATIVE | Facility: OTHER | Age: 68
End: 2018-01-01

## 2018-01-01 ENCOUNTER — RESULTS ONLY (OUTPATIENT)
Dept: OTHER | Facility: CLINIC | Age: 68
End: 2018-01-01

## 2018-01-01 ENCOUNTER — HOSPITAL ENCOUNTER (OUTPATIENT)
Dept: ULTRASOUND IMAGING | Facility: HOSPITAL | Age: 68
Discharge: HOME | End: 2018-07-23
Attending: INTERPRETER | Admitting: RADIOLOGY

## 2018-01-01 ENCOUNTER — OFFICE VISIT (OUTPATIENT)
Dept: CARDIOLOGY | Facility: CLINIC | Age: 68
End: 2018-01-01
Attending: INTERNAL MEDICINE
Payer: COMMERCIAL

## 2018-01-01 ENCOUNTER — TELEPHONE (OUTPATIENT)
Dept: FAMILY MEDICINE | Facility: CLINIC | Age: 68
End: 2018-01-01

## 2018-01-01 ENCOUNTER — OFFICE VISIT (OUTPATIENT)
Dept: TRANSPLANT | Facility: CLINIC | Age: 68
End: 2018-01-01
Attending: INTERNAL MEDICINE
Payer: MEDICARE

## 2018-01-01 ENCOUNTER — HOSPITAL ENCOUNTER (OUTPATIENT)
Dept: ULTRASOUND IMAGING | Facility: HOSPITAL | Age: 68
Discharge: HOME | End: 2018-03-19
Admitting: RADIOLOGY

## 2018-01-01 ENCOUNTER — MEDICAL CORRESPONDENCE (OUTPATIENT)
Dept: HEALTH INFORMATION MANAGEMENT | Facility: CLINIC | Age: 68
End: 2018-01-01

## 2018-01-01 ENCOUNTER — TRANSFERRED RECORDS (OUTPATIENT)
Dept: HEALTH INFORMATION MANAGEMENT | Facility: CLINIC | Age: 68
End: 2018-01-01

## 2018-01-01 ENCOUNTER — HOSPITAL ENCOUNTER (OUTPATIENT)
Dept: GENERAL RADIOLOGY | Facility: CLINIC | Age: 68
Discharge: HOME | End: 2018-08-27
Attending: INTERNAL MEDICINE | Admitting: INTERNAL MEDICINE
Payer: MEDICARE

## 2018-01-01 ENCOUNTER — PRE VISIT (OUTPATIENT)
Dept: CARDIOLOGY | Facility: CLINIC | Age: 68
End: 2018-01-01

## 2018-01-01 ENCOUNTER — COMMITTEE REVIEW (OUTPATIENT)
Dept: TRANSPLANT | Facility: CLINIC | Age: 68
End: 2018-01-01

## 2018-01-01 ENCOUNTER — HOSPITAL ENCOUNTER (OUTPATIENT)
Dept: ULTRASOUND IMAGING | Facility: HOSPITAL | Age: 68
Discharge: HOME | End: 2018-06-19
Attending: INTERPRETER | Admitting: RADIOLOGY

## 2018-01-01 ENCOUNTER — SURGERY - HEALTHEAST (OUTPATIENT)
Dept: GASTROENTEROLOGY | Facility: HOSPITAL | Age: 68
End: 2018-01-01

## 2018-01-01 ENCOUNTER — OFFICE VISIT (OUTPATIENT)
Dept: GASTROENTEROLOGY | Facility: CLINIC | Age: 68
End: 2018-01-01
Attending: INTERNAL MEDICINE
Payer: MEDICARE

## 2018-01-01 ENCOUNTER — TELEPHONE (OUTPATIENT)
Dept: GASTROENTEROLOGY | Facility: CLINIC | Age: 68
End: 2018-01-01

## 2018-01-01 ENCOUNTER — COMMUNICATION - HEALTHEAST (OUTPATIENT)
Dept: INTERVENTIONAL RADIOLOGY/VASCULAR | Facility: HOSPITAL | Age: 68
End: 2018-01-01

## 2018-01-01 ENCOUNTER — OFFICE VISIT (OUTPATIENT)
Dept: URGENT CARE | Facility: URGENT CARE | Age: 68
End: 2018-01-01
Payer: COMMERCIAL

## 2018-01-01 ENCOUNTER — HOSPITAL ENCOUNTER (OUTPATIENT)
Dept: ULTRASOUND IMAGING | Facility: HOSPITAL | Age: 68
Discharge: HOME | End: 2018-05-30
Attending: INTERPRETER | Admitting: RADIOLOGY

## 2018-01-01 ENCOUNTER — HOSPITAL ENCOUNTER (OUTPATIENT)
Dept: ULTRASOUND IMAGING | Facility: HOSPITAL | Age: 68
Discharge: HOME | End: 2018-08-21
Attending: INTERPRETER | Admitting: RADIOLOGY

## 2018-01-01 ENCOUNTER — HOSPITAL ENCOUNTER (OUTPATIENT)
Dept: ULTRASOUND IMAGING | Facility: HOSPITAL | Age: 68
Discharge: HOME | End: 2018-07-30
Admitting: RADIOLOGY

## 2018-01-01 ENCOUNTER — HOSPITAL ENCOUNTER (OUTPATIENT)
Dept: ULTRASOUND IMAGING | Facility: HOSPITAL | Age: 68
Discharge: HOME | End: 2018-07-10
Admitting: RADIOLOGY

## 2018-01-01 ENCOUNTER — HOSPITAL ENCOUNTER (OUTPATIENT)
Dept: ULTRASOUND IMAGING | Facility: CLINIC | Age: 68
Discharge: HOME | End: 2018-09-04
Admitting: RADIOLOGY

## 2018-01-01 ENCOUNTER — HOSPITAL ENCOUNTER (OUTPATIENT)
Dept: CARDIOLOGY | Facility: CLINIC | Age: 68
End: 2018-08-27
Attending: INTERNAL MEDICINE
Payer: MEDICARE

## 2018-01-01 ENCOUNTER — OFFICE VISIT (OUTPATIENT)
Dept: TRANSPLANT | Facility: CLINIC | Age: 68
End: 2018-01-01
Attending: TRANSPLANT SURGERY
Payer: MEDICARE

## 2018-01-01 VITALS
SYSTOLIC BLOOD PRESSURE: 102 MMHG | OXYGEN SATURATION: 94 % | BODY MASS INDEX: 30.36 KG/M2 | DIASTOLIC BLOOD PRESSURE: 65 MMHG | HEIGHT: 61 IN | TEMPERATURE: 97.7 F | HEART RATE: 74 BPM | WEIGHT: 160.8 LBS

## 2018-01-01 VITALS
OXYGEN SATURATION: 100 % | SYSTOLIC BLOOD PRESSURE: 114 MMHG | HEART RATE: 68 BPM | DIASTOLIC BLOOD PRESSURE: 70 MMHG | RESPIRATION RATE: 14 BRPM | WEIGHT: 156 LBS | BODY MASS INDEX: 30.47 KG/M2 | TEMPERATURE: 98 F

## 2018-01-01 VITALS
OXYGEN SATURATION: 96 % | DIASTOLIC BLOOD PRESSURE: 61 MMHG | HEART RATE: 63 BPM | BODY MASS INDEX: 29.07 KG/M2 | HEIGHT: 61 IN | WEIGHT: 154 LBS | SYSTOLIC BLOOD PRESSURE: 101 MMHG

## 2018-01-01 VITALS — HEIGHT: 61 IN

## 2018-01-01 DIAGNOSIS — R18.8 OTHER ASCITES: ICD-10-CM

## 2018-01-01 DIAGNOSIS — Z79.899 OTHER LONG TERM (CURRENT) DRUG THERAPY: ICD-10-CM

## 2018-01-01 DIAGNOSIS — Z94.0 HISTORY OF KIDNEY TRANSPLANT: ICD-10-CM

## 2018-01-01 DIAGNOSIS — R18.8 ASCITIC FLUID: ICD-10-CM

## 2018-01-01 DIAGNOSIS — K74.60 CIRRHOSIS OF LIVER WITH ASCITES, UNSPECIFIED HEPATIC CIRRHOSIS TYPE (H): ICD-10-CM

## 2018-01-01 DIAGNOSIS — R60.0 LOCALIZED EDEMA: ICD-10-CM

## 2018-01-01 DIAGNOSIS — K74.60 CIRRHOSIS OF LIVER (H): Primary | ICD-10-CM

## 2018-01-01 DIAGNOSIS — K74.60 CHRONIC HEPATITIS B WITH CIRRHOSIS (H): Primary | ICD-10-CM

## 2018-01-01 DIAGNOSIS — Z12.5 ENCOUNTER FOR SCREENING FOR MALIGNANT NEOPLASM OF PROSTATE: ICD-10-CM

## 2018-01-01 DIAGNOSIS — K74.60 CIRRHOSIS OF LIVER (H): ICD-10-CM

## 2018-01-01 DIAGNOSIS — R18.8 CIRRHOSIS OF LIVER WITH ASCITES, UNSPECIFIED HEPATIC CIRRHOSIS TYPE (H): Primary | ICD-10-CM

## 2018-01-01 DIAGNOSIS — N18.30 CKD (CHRONIC KIDNEY DISEASE) STAGE 3, GFR 30-59 ML/MIN (H): ICD-10-CM

## 2018-01-01 DIAGNOSIS — E11.9 TYPE 2 DIABETES MELLITUS WITH HEMOGLOBIN A1C GOAL OF LESS THAN 8.0% (H): ICD-10-CM

## 2018-01-01 DIAGNOSIS — B18.1 CHRONIC HEPATITIS B (H): ICD-10-CM

## 2018-01-01 DIAGNOSIS — B18.1 CHRONIC HEPATITIS B WITH CIRRHOSIS (H): Primary | ICD-10-CM

## 2018-01-01 DIAGNOSIS — B18.1 CHRONIC HEPATITIS B (H): Primary | ICD-10-CM

## 2018-01-01 DIAGNOSIS — D84.9 IMMUNOSUPPRESSION (H): ICD-10-CM

## 2018-01-01 DIAGNOSIS — K75.0 HEPATIC ABSCESS: ICD-10-CM

## 2018-01-01 DIAGNOSIS — K65.2 SBP (SPONTANEOUS BACTERIAL PERITONITIS) (H): ICD-10-CM

## 2018-01-01 DIAGNOSIS — H11.32 SCLERAL HEMORRHAGE OF LEFT EYE: Primary | ICD-10-CM

## 2018-01-01 DIAGNOSIS — B19.10 HEPATITIS B: Primary | ICD-10-CM

## 2018-01-01 DIAGNOSIS — B19.10 HBV (HEPATITIS B VIRUS) INFECTION: Primary | ICD-10-CM

## 2018-01-01 DIAGNOSIS — Z01.818 PRE-TRANSPLANT EVALUATION FOR LIVER TRANSPLANT: Primary | ICD-10-CM

## 2018-01-01 DIAGNOSIS — R18.8 CIRRHOSIS OF LIVER WITH ASCITES, UNSPECIFIED HEPATIC CIRRHOSIS TYPE (H): ICD-10-CM

## 2018-01-01 DIAGNOSIS — Z76.82 ORGAN TRANSPLANT CANDIDATE: Primary | ICD-10-CM

## 2018-01-01 DIAGNOSIS — K74.60 CIRRHOSIS OF LIVER WITH ASCITES, UNSPECIFIED HEPATIC CIRRHOSIS TYPE (H): Primary | ICD-10-CM

## 2018-01-01 DIAGNOSIS — Z01.810 PRE-OPERATIVE CARDIOVASCULAR EXAMINATION: Primary | ICD-10-CM

## 2018-01-01 LAB
A* LOCUS NMDP: NORMAL
A* LOCUS: NORMAL
A* NMDP: NORMAL
A*: NORMAL
ABO + RH BLD: NORMAL
ABTEST METHOD: NORMAL
AFP SERPL-MCNC: 7.5 UG/L (ref 0–8)
ALBUMIN FLD-MCNC: 1.1 G/DL
ALBUMIN SERPL-MCNC: 2.3 G/DL (ref 3.4–5)
ALBUMIN UR-MCNC: NEGATIVE MG/DL
ALP SERPL-CCNC: 253 U/L (ref 40–150)
ALT SERPL W P-5'-P-CCNC: 51 U/L (ref 0–70)
ANION GAP SERPL CALCULATED.3IONS-SCNC: 14 MMOL/L (ref 3–14)
APPEARANCE FLD: ABNORMAL
APPEARANCE UR: CLEAR
AST SERPL W P-5'-P-CCNC: 70 U/L (ref 0–45)
B* LOCUS NMDP: NORMAL
B* LOCUS: NORMAL
B* NMDP: NORMAL
B*: NORMAL
BILIRUB DIRECT SERPL-MCNC: 0.4 MG/DL (ref 0–0.2)
BILIRUB SERPL-MCNC: 0.7 MG/DL (ref 0.2–1.3)
BILIRUB UR QL STRIP: NEGATIVE
BLD GP AB SCN SERPL QL: NORMAL
BLD GP AB SCN TITR SERPL: NORMAL {TITER}
BLOOD BANK CMNT PATIENT-IMP: NORMAL
BUN SERPL-MCNC: 105 MG/DL (ref 7–30)
BW-1: NORMAL
C* LOCUS NMDP: NORMAL
C* LOCUS: NORMAL
C* NMDP: NORMAL
C*: NORMAL
CALCIUM SERPL-MCNC: 8.7 MG/DL (ref 8.5–10.1)
CHLORIDE SERPL-SCNC: 86 MMOL/L (ref 94–109)
CHOLEST SERPL-MCNC: 137 MG/DL
CMV IGG SERPL QL IA: >8 AI (ref 0–0.8)
CO2 SERPL-SCNC: 27 MMOL/L (ref 20–32)
COLOR FLD: YELLOW
COLOR UR AUTO: YELLOW
CREAT SERPL-MCNC: 4.87 MG/DL (ref 0.66–1.25)
CREAT UR-MCNC: 128 MG/DL
DEPRECATED CALCIDIOL+CALCIFEROL SERPL-MC: 33 UG/L (ref 20–75)
DLCOCOR-%PRED-PRE: 49 %
DLCOCOR-PRE: 11.42 ML/MIN/MMHG
DLCOUNC-%PRED-PRE: 41 %
DLCOUNC-PRE: 9.5 ML/MIN/MMHG
DLCOUNC-PRED: 23 ML/MIN/MMHG
DPA1* LOCUS NMDP: NORMAL
DPA1* NMDP: NORMAL
DPA1*: NORMAL
DPA1*LOCUS: NORMAL
DPB1* LOCUS NMDP: NORMAL
DPB1*: NORMAL
DPB1*LOCUS: NORMAL
DQA1*: NORMAL
DQA1*LOCUS NMDP: NORMAL
DQA1*LOCUS: NORMAL
DQA1*NMDP: NORMAL
DQB1* LOCUS NMDP: NORMAL
DQB1* LOCUS: NORMAL
DQB1* NMDP: NORMAL
DQB1*: NORMAL
DRB1* LOCUS NMDP: NORMAL
DRB1* LOCUS: NORMAL
DRB1* NMDP: NORMAL
DRB1*: NORMAL
DRB3* LOCUS NMDP: NORMAL
DRB3* LOCUS: NORMAL
DRB5* NMDP: NORMAL
DRB5*: NORMAL
DRSSO TEST METHOD: NORMAL
EBV VCA IGG SER QL IA: 7.7 AI (ref 0–0.8)
EOSINOPHIL NFR FLD MANUAL: ABNORMAL %
ERV-%PRED-PRE: 104 %
ERV-PRE: 0.47 L
ERV-PRED: 0.45 L
ERYTHROCYTE [DISTWIDTH] IN BLOOD BY AUTOMATED COUNT: 14.1 % (ref 10–15)
ETHYL GLUCURONIDE UR QL: NEGATIVE
EXPTIME-PRE: 9.15 SEC
FEF2575-%PRED-PRE: 60 %
FEF2575-PRE: 1.18 L/SEC
FEF2575-PRED: 1.94 L/SEC
FEFMAX-%PRED-PRE: 57 %
FEFMAX-PRE: 3.83 L/SEC
FEFMAX-PRED: 6.72 L/SEC
FEV1-%PRED-PRE: 62 %
FEV1-PRE: 1.43 L
FEV1FEV6-PRE: 78 %
FEV1FEV6-PRED: 78 %
FEV1FVC-PRE: 77 %
FEV1FVC-PRED: 74 %
FEV1SVC-PRE: 78 %
FEV1SVC-PRED: 70 %
FIFMAX-PRE: 2.76 L/SEC
FRCPLETH-%PRED-PRE: 59 %
FRCPLETH-PRE: 1.87 L
FRCPLETH-PRED: 3.14 L
FVC-%PRED-PRE: 64 %
FVC-PRE: 1.86 L
FVC-PRED: 2.88 L
GAMMA INTERFERON BACKGROUND BLD IA-ACNC: 0.07 IU/ML
GFR SERPL CREATININE-BSD FRML MDRD: 12 ML/MIN/1.7M2
GLUCOSE SERPL-MCNC: 288 MG/DL (ref 70–99)
GLUCOSE UR STRIP-MCNC: NEGATIVE MG/DL
HAV IGG SER QL IA: REACTIVE
HCT VFR BLD AUTO: 28.7 % (ref 40–53)
HCV AB SERPL QL IA: REACTIVE
HCV RNA SERPL NAA+PROBE-ACNC: NORMAL [IU]/ML
HCV RNA SERPL NAA+PROBE-LOG IU: NORMAL LOG IU/ML
HDLC SERPL-MCNC: 20 MG/DL
HGB BLD-MCNC: 9.8 G/DL (ref 13.3–17.7)
HGB UR QL STRIP: NEGATIVE
HIV 1+2 AB+HIV1 P24 AG SERPL QL IA: NONREACTIVE
HLA TYPING COMPLETE SOT RECIPIENT: NORMAL
IC-%PRED-PRE: 48 %
IC-PRE: 1.36 L
IC-PRED: 2.8 L
INR PPP: 1.2 (ref 0.86–1.14)
INTERPRETATION ECG - MUSE: NORMAL
IRON SATN MFR SERPL: 73 % (ref 15–46)
IRON SERPL-MCNC: 122 UG/DL (ref 35–180)
KETONES UR STRIP-MCNC: NEGATIVE MG/DL
LDLC SERPL CALC-MCNC: 87 MG/DL
LEUKOCYTE ESTERASE UR QL STRIP: NEGATIVE
LYMPHOCYTES NFR FLD MANUAL: 11 %
M TB IFN-G BLD-IMP: ABNORMAL
M TB IFN-G CD4+ BCKGRND COR BLD-ACNC: 0 IU/ML
MACROPHAGE % - HISTORICAL: 73 %
MCH RBC QN AUTO: 34.6 PG (ref 26.5–33)
MCHC RBC AUTO-ENTMCNC: 34.1 G/DL (ref 31.5–36.5)
MCV RBC AUTO: 101 FL (ref 78–100)
MESOTHELIALS, FLUID: 3 %
MITOGEN IGNF BCKGRD COR BLD-ACNC: 0 IU/ML
MITOGEN IGNF BCKGRD COR BLD-ACNC: 0 IU/ML
MONOCYTE % - HISTORICAL: 7 %
NEUTS BAND NFR FLD MANUAL: 8 %
NITRATE UR QL: NEGATIVE
NONHDLC SERPL-MCNC: 117 MG/DL
OTHER CELLS FLD MANUAL: ABNORMAL %
PH UR STRIP: 5 PH (ref 5–7)
PHOSPHATE SERPL-MCNC: 6.6 MG/DL (ref 2.5–4.5)
PLATELET # BLD AUTO: 49 10E9/L (ref 150–450)
POTASSIUM SERPL-SCNC: 3.7 MMOL/L (ref 3.4–5.3)
PRA SINGLE ANTIGEN IGG ANTIBODY: NORMAL
PROT FLD-MCNC: 1.5 G/DL
PROT SERPL-MCNC: 5.9 G/DL (ref 6.8–8.8)
PROT UR-MCNC: 0.18 G/L
PROT/CREAT 24H UR: 0.14 G/G CR (ref 0–0.2)
PSA SERPL-ACNC: 0.09 UG/L (ref 0–4)
RADIOLOGIST FLAGS: NORMAL
RBC # BLD AUTO: 2.83 10E12/L (ref 4.4–5.9)
RBC FLUID - HISTORICAL: ABNORMAL /UL
RVPLETH-%PRED-PRE: 61 %
RVPLETH-PRE: 1.4 L
RVPLETH-PRED: 2.28 L
SA1 CELL: NORMAL
SA1 COMMENTS: NORMAL
SA1 HI RISK ABY: NORMAL
SA1 MOD RISK ABY: NORMAL
SA1 TEST METHOD: NORMAL
SA2 CELL: NORMAL
SA2 COMMENTS: NORMAL
SA2 HI RISK ABY UA: NORMAL
SA2 MOD RISK ABY: NORMAL
SA2 TEST METHOD: NORMAL
SODIUM SERPL-SCNC: 127 MMOL/L (ref 133–144)
SOURCE: NORMAL
SP GR UR STRIP: 1.01 (ref 1–1.03)
SPECIMEN EXP DATE BLD: NORMAL
SPECIMEN EXP DATE BLD: NORMAL
T PALLIDUM AB SER QL: NONREACTIVE
T4 FREE SERPL-MCNC: 0.25 NG/DL (ref 0.76–1.46)
TIBC SERPL-MCNC: 166 UG/DL (ref 240–430)
TLCPLETH-%PRED-PRE: 60 %
TLCPLETH-PRE: 3.23 L
TLCPLETH-PRED: 5.3 L
TRANSFERRIN SERPL-MCNC: 147 MG/DL (ref 210–360)
TRIGL SERPL-MCNC: 151 MG/DL
TSH SERPL DL<=0.005 MIU/L-ACNC: 47.04 MU/L (ref 0.4–4)
UROBILINOGEN UR STRIP-MCNC: 0 MG/DL (ref 0–2)
VA-%PRED-PRE: 55 %
VA-PRE: 2.81 L
VC-%PRED-PRE: 56 %
VC-PRE: 1.83 L
VC-PRED: 3.25 L
WBC # BLD AUTO: 8.4 10E9/L (ref 4–11)
WBC # FLD AUTO: 62 /UL (ref 0–99)

## 2018-01-01 PROCEDURE — 25500064 ZZH RX 255 OP 636: Performed by: INTERNAL MEDICINE

## 2018-01-01 PROCEDURE — 71046 X-RAY EXAM CHEST 2 VIEWS: CPT

## 2018-01-01 PROCEDURE — 93016 CV STRESS TEST SUPVJ ONLY: CPT | Performed by: INTERNAL MEDICINE

## 2018-01-01 PROCEDURE — 93325 DOPPLER ECHO COLOR FLOW MAPG: CPT | Mod: 26 | Performed by: INTERNAL MEDICINE

## 2018-01-01 PROCEDURE — 93018 CV STRESS TEST I&R ONLY: CPT | Performed by: INTERNAL MEDICINE

## 2018-01-01 PROCEDURE — 99203 OFFICE O/P NEW LOW 30 MIN: CPT | Mod: ZP | Performed by: INTERNAL MEDICINE

## 2018-01-01 PROCEDURE — 93350 STRESS TTE ONLY: CPT | Mod: 26 | Performed by: INTERNAL MEDICINE

## 2018-01-01 PROCEDURE — 93017 CV STRESS TEST TRACING ONLY: CPT

## 2018-01-01 PROCEDURE — 76700 US EXAM ABDOM COMPLETE: CPT

## 2018-01-01 PROCEDURE — 99214 OFFICE O/P EST MOD 30 MIN: CPT | Performed by: NURSE PRACTITIONER

## 2018-01-01 PROCEDURE — G0463 HOSPITAL OUTPT CLINIC VISIT: HCPCS | Mod: ZF

## 2018-01-01 PROCEDURE — 93321 DOPPLER ECHO F-UP/LMTD STD: CPT | Mod: 26 | Performed by: INTERNAL MEDICINE

## 2018-01-01 PROCEDURE — 25000128 H RX IP 250 OP 636: Performed by: INTERNAL MEDICINE

## 2018-01-01 RX ORDER — DOBUTAMINE HYDROCHLORIDE 200 MG/100ML
10-50 INJECTION INTRAVENOUS CONTINUOUS
Status: SHIPPED | OUTPATIENT
Start: 2018-01-01 | End: 2018-01-01

## 2018-01-01 RX ORDER — METOPROLOL TARTRATE 1 MG/ML
1-30 INJECTION, SOLUTION INTRAVENOUS
Status: DISPENSED | OUTPATIENT
Start: 2018-01-01 | End: 2018-01-01

## 2018-01-01 RX ORDER — ALBUTEROL SULFATE 90 UG/1
AEROSOL, METERED RESPIRATORY (INHALATION)
Qty: 8.5 G | Refills: 0 | Status: SHIPPED | OUTPATIENT
Start: 2018-01-01

## 2018-01-01 RX ADMIN — HUMAN ALBUMIN MICROSPHERES AND PERFLUTREN 9 ML: 10; .22 INJECTION, SOLUTION INTRAVENOUS at 10:30

## 2018-01-01 RX ADMIN — DOBUTAMINE IN DEXTROSE 30 MCG/KG/MIN: 200 INJECTION, SOLUTION INTRAVENOUS at 10:53

## 2018-01-01 ASSESSMENT — MIFFLIN-ST. JEOR
SCORE: 1458
SCORE: 1417.46
SCORE: 1335.53
SCORE: 1458
SCORE: 1453.46
SCORE: 1386.78

## 2018-01-01 ASSESSMENT — PAIN SCALES - GENERAL: PAINLEVEL: NO PAIN (0)

## 2018-01-02 NOTE — TELEPHONE ENCOUNTER
I have never prescribed this. Dr. Mobley did last. 1 refill sent to pharmacy. However, please call patient to check on reason for refill. Last given for asthma exacerbation? Want to make sure he is not actively ill. If so, recommend OV. Also, will need to mail ACT to patient.     Thanks,    Sha Obrien, PAC

## 2018-01-02 NOTE — TELEPHONE ENCOUNTER
Routing refill request to provider for review/approval because:  Drug not active on patient's medication list  Julieta Gonzalez RN, BSN        Last Written Prescription Date: n/a   Last Fill Quantity: n/a,  # refills: n/a   Last Office Visit with [facility], [facility] or [facility] prescribing provider:  10/25/17   Future Office Visit:         Requested Prescriptions   Pending Prescriptions Disp Refills     PROAIR  (90 BASE) MCG/ACT inhaler [Pharmacy Med Name: PROAIR HFA ORAL INH (200  PFS) 8.5G] 8.5 g 0     Sig: INHALE 2 PUFFS BY MOUTH EVERY 6 HOURS AS NEEDED FOR SHORTNESS OF BREATH/DYPSNEA/WHEEZING    Asthma Maintenance Inhalers - Anticholinergics Failed    12/28/2017 10:14 AM       Failed - Asthma control test score is 20 or greater in last 6 months    Please review ACT score.          Passed - Patient is age 12 years or older       Passed - Recent (6 mo) or future visit with authorizing provider's specialty    Patient had office visit in the last 6 months or has a visit in the next 30 days with authorizing provider.  See chart review.

## 2018-01-04 NOTE — PROGRESS NOTES
Clinic Care Coordination Contact    Situation: Patient chart reviewed by care coordinator.    Background: sepsis     Assessment: patient was lost to follow up - now per notes from triage RN he is no longer being seen at  clinics for primary care - he is being seen by a provider in [place]     Plan/Recommendations: closing care coordination as no longer [facility] patient    Mariela Moraes Care Coordinator RN  [facility]  [phone]  January 4, 2018

## 2018-01-04 NOTE — TELEPHONE ENCOUNTER
"See other encounter, pt was mailed ACT and outreached for asthma f/u, needs [facility] interpretor, #[id number], pt informs received inhaler, no acute issues, pt informs moved to [place] and changing doctors, \"has already done the paper work\", will not need anymore services from us, pcp updated  Tiarra Gonzalez RN, BSN  Message handled by Nurse Triage.    "

## 2018-01-04 NOTE — TELEPHONE ENCOUNTER
CC ANTONIOI, pt moved to [place], informs has new doctor now  Tiarra Gonzalez RN, BSN  Message handled by Nurse Triage.

## 2018-04-05 NOTE — LETTER
[facility]  [id number] [place] [id number] [phone]  April 5, 2018    Junior Patricia  [address] [place] [address]    Dear Junior,    I care about your health and have reviewed your health plan. I have reviewed your medical conditions, medication list, and lab results and am making recommendations based on this review, to better manage your health.    You are in particular need of attention regarding:  -Colon Cancer Screening    I am recommending that you:  {recommendations:-schedule a COLONOSCOPY to look for colon cancer (due every 10 years or 5 years in higher risk situations.)   Colon cancer is now the second leading cause of death in the [place] for both men and women and there are over 130,000 new cases and 50,000 deaths per year from colon cancer.  Colonoscopies can prevent 90-95% of these deaths.  Problem lesions can be removed before they ever become cancer.  This test is not only looking for cancer, but also getting rid of precancerious lesions.  If you do not wish to do a colonoscopy or cannot afford to do one, at this time, there is another option. It is called a FIT test or Fecal Immunochemical Occult Blood Test (take home stool sample kit).  It does not replace the colonoscopy for colorectal cancer screening, but it can detect hidden bleeding in the lower colon.  It does need to be repeated every year and if a positive result is obtained, you would be referred for a colonoscopy.  If you have completed either one of these tests at another facility, please have the records sent to our clinic so that we can best coordinate your care.    Please call us at [phone] (or use Oink) to address the above recommendations.     Thank you for trusting [facility] and we appreciate the opportunity to serve you.  We look forward to supporting your healthcare needs in the future.    Healthy Regards,    Lenin Obrien PA-C

## 2018-04-05 NOTE — TELEPHONE ENCOUNTER
Panel Management Review      Patient has the following on his problem list: None      Composite cancer screening  Chart review shows that this patient is due/due soon for the following Colonoscopy  Summary:    Patient is due/failing the following:   COLONOSCOPY    Action needed:   Patient needs referral/order: colonoscopy or FIT test    Type of outreach:    Sent letter.   patient needing   Questions for provider review:    None                                                                                                                                    ADALI Moses       Chart routed to Care Team .

## 2018-04-08 NOTE — PROGRESS NOTES
"SUBJECTIVE: Junior Patricia is a 67 year old male with sudden onset of \"blood in eye\" that awoke this morning with decreased visual acuity. He reports no trauma, falls, coughing, N/V symptoms. Denies any hypertension issues., does endorse  a history of type II DM. Denies any significant prior ophthalmological history. Denies any photophobia or severe eye pain. Denies any blood thinner medications. Denies any contact use, reading glasses only.     OBJECTIVE:   /70 (BP Location: Right arm, Patient Position: Chair, Cuff Size: Adult Regular)  Pulse 68  Temp 98  F (36.7  C) (Oral)  Resp 14  Wt 156 lb (70.8 kg)  SpO2 100%  BMI 30.47 kg/m2  General: Patient appears alert, in no acute distress  Eyes: left eye with findings of entire sclera hemorrhage, no bulbous bulge. PERRL, EOMs intact, unable to clearly visualize fundui noted. No discharge or visible foreign body noted. No periorbital cellulitis.      ASSESSMENT: Sclera hemorrhage vs. Acute eye injury    PLAN: Discussed with patient and daughter need for further evaluation at ED due to concern of intraocular injury or globe trauma. Daughter to present to [facility] by private vehicle. Reports called to DIMITRY Garcia.     Roberto Carlos Zarco, APRN, CNP    "

## 2018-04-13 NOTE — TELEPHONE ENCOUNTER
Call from [facility] nephrology, they just wanted to check that he had GI followup as he has cirrhosis and has post transplant status.   They will check with [facility] where he's seen Dr Mervin HODGES spoke with them today jm

## 2018-07-24 NOTE — LETTER
July 24, 2018    Junior Patricia  [address] [place] [address]    Dear Junior,    Thank you for your interest in the Transplant Center at [facility], [facility]. We look forward to being a part of your care team and assisting you through the transplant process.    As we discussed, your transplant coordinator is Lindsey Mosley (Liver, Kidney).  You may call your coordinator at any time with questions or concerns, call [phone] option 4.    Please complete the following.    1. Fill out and return the enclosed forms    Authorization for Electronic Communication    Authorization to Discuss Protected Health Information    [employer] Release of Information    2. Sign up for:    [employer], access to your electronic medical record (see enclosed pamphlet)    [employer], a transplant education website (see enclosed booklet)    You can use these tools to learn more about your transplant, communicate with your care team, and track your medical details    Sincerely,    Solid Organ Transplant  [facility], [facility]    cc: Care Team

## 2018-07-24 NOTE — Clinical Note
Please schedule with Dr. Quintero and jennifer team on 8/28. Will need 1:1 class that can be done on another day that has space (preferable Wed/Thurs). Mary: He lives close so will be able to come back for PKE. Thanks, Lindsey

## 2018-07-24 NOTE — TELEPHONE ENCOUNTER
Intake Progress Note    Organ:  Liver/Kidney    Initial Call Made by: referral from [facility]    Referring Physician: Dr Dennis     Assigned Coordinator: Lindsey Mosley    Reported Diagnosis: Chronic viral hepatitis B without delta agent and without coma, Essential (primary) hypertension Inpt at [facility]     On Dialysis:   No    Reported Medical History: Liver problems- Cirrhosis- Parcentesis 3-4 weeks  Had a  kidney transplant 2004-[facility] [place]- loosing function-     Records:  LPN will request.     Clinic RN Appt (Only Adult Kidney, Liver and Pancreas Referrals): Y or N    Preferred Evaluation Start Date:  ASAP     Online Forms    Best time patient can be reached:anytime    Type of packet sent:  Liver/Kidney Packet     Misc. Notes: May speak with emergency contacts wife and daughter     Verbal Consent: Y    Email Consent: Y or N    If no PCP or referring information the time of call informed pt to call back with information: Y or N    Informed patient to call if doesn't receive packet and or phone call from coordinator within given time    Insurance- [employer] Blue- ID [id number] Group [id number] and [place] Medical Assistance- [id number]

## 2018-08-03 NOTE — TELEPHONE ENCOUNTER
Multiple attempts to call patient an wife with [employer] . Left message on home phone. Wife's phone unavailable. Will verify with referring provider contact numbers.

## 2018-08-09 NOTE — TELEPHONE ENCOUNTER
Spoke with patient and wife with aid of . Patient agrees to come in last week of August for evaluation. He does not smoke or drink. His main issue is ascites and denies any HE or GIB.    He refuses colonoscopy due to poor experience (pain) with last procedure in 2005.     Orders placed for transplant evaluation week of 8/27 with PKE since GFR <40 ml/min/1.73m2 since 4/2018 and < 30 on more than one occasion. Can be cancelled if kidney alone.

## 2018-08-20 NOTE — TELEPHONE ENCOUNTER
Panel Management Review      Patient has the following on his problem list:     Diabetes    ASA: Passed    Last A1C  Lab Results   Component Value Date    A1C 5.9 07/28/2017    A1C 7.0 04/05/2017    A1C 6.7 08/04/2014    A1C 6.8 05/02/2012    A1C 6.3 03/15/2006     A1C tested: FAILED    Last LDL:    Lab Results   Component Value Date    CHOL 186 05/10/2011     Lab Results   Component Value Date    HDL 40 05/10/2011     Lab Results   Component Value Date    LDL 66 04/05/2017     05/10/2011     Lab Results   Component Value Date    TRIG 137 05/10/2011     Lab Results   Component Value Date    CHOLHDLRATIO 4.7 05/10/2011     No results found for: NHDL    Is the patient on a Statin? NO             Is the patient on Aspirin? YES    Medications     Salicylates    aspirin 81 MG EC tablet          Last three blood pressure readings:  BP Readings from Last 3 Encounters:   04/08/18 114/70   10/27/17 115/78   10/25/17 126/77       Date of last diabetes office visit: 10/25/17     Tobacco History:     History   Smoking Status     Never Smoker   Smokeless Tobacco     Never Used           Composite cancer screening  Chart review shows that this patient is due/due soon for the following None  Summary:    Patient is due/failing the following:   A1C and FOLLOW UP    Action needed:   Patient needs office visit for diabetes/routine follow up. .    Type of outreach:    please call patient to schedule follow up    Questions for provider review:    None                                                                                                                                    Sha Obrien, PAC

## 2018-08-20 NOTE — TELEPHONE ENCOUNTER
Type of outreach:  Phone, spoke to patient.  through  service, he has move to [facility] and is receiving care at a [facility] clinic. He is being seen monthly for his diabetes.   Clara Meza MA on 8/20/2018 at 1:38 PM

## 2018-08-27 NOTE — PROGRESS NOTES
Chart review:  8/21/2018 Admit/Discharge:    Sepsis secondary due to SBP and community-acquired pneumonia  Acute respiratory insufficiency resolved   acute on chronic kidney disease stage III     US with 2 liver lesions.    8/2018 A1C Hgb 9.0

## 2018-08-27 NOTE — PROGRESS NOTES
Pt here for dobutamine stress test.  Test, meds and side effects reviewed with patient.  Test terminated d/t frequent ectopy and drop in blood pressure. Cardiologist came to assess patient at bedside. IVF bolus given with improvement in BP. Ectopy resolved after dobutamine turned off.  Post monitoring complete and VSS.  Pt escorted out to the gold waiting room.

## 2018-08-28 NOTE — PROGRESS NOTES
Outpatient MNT: Liver Kidney Transplant Evaluation    Current BMI: 30.4 (HT 61 in,  lbs/73 kg)  BMI is within criteria of <40 for liver kidney transplant     Time Spent: 15 minutes  Visit Type: Initial  Referring Physician: Dr Oconnor  Pt accompanied by: his wife and Hmong      History of previous txp: kidney 2004    Nutrition Assessment  Pt's wife does the cooking at home. She does report adding some salt in her cooking, in addition to some fish sauce and lower salt soy sauce. Both pt and wife acknowledge that these sauces are high in sodium, yet hard to cut out of their diet completely. Pt reports he eats 2-3 meals/day. Within the last few weeks, he has lost his taste for meat. He reports smell of these meats/protein in general are not appealing. He used to eat more meat/protein in the past. Eats meat (pork, chicken, fish, beef) not daily, eggs 1-2x/week, rare tofu. No beans/legumes.     Appetite: poor for the last few weeks r/t smell    Vitamins, Supplements, Pertinent Meds: vit D  Herbal Medicines/Supplements: none     Diet Recall  Breakfast Rice soup (just rice and water) with boiled veggies    Lunch Same as BF   Dinner Same as BF   Snacks Occasional cracker or bread    Beverages Hot tea, water, occasional coffee, occasional apple juice; reports dislike of Ensure (daughter bought for him)   Dining out None now d/t smells, previously 1x/month at baseline      Physical Activity  None 2/2 weakness      Anthropometrics  Height:   61 in   BMI:    30.4    Weight Status:Obesity Grade I BMI 30-34.9   Weight:  160 lbs            IBW (lb): 112  % IBW: 143    Wt Hx: Pt reports + ascites and mild CHRIS. UBW in the 140s prior to becoming ill, up now 2/2 fluid status. However, per chart review, weights typically were in 180s-190s over the past 3 years. Weight down actually.    Adj/dosing BW: 124 lbs/56 kg       Frailty Screening   Weakness Meets criteria for frailty if  strength (average of 3 trials,  dominant hand) is:    Men  ?29 kg for BMI ?24  ?30 kg for BMI 24.1-26  ?30 kg for BMI 26.1-28  ?32 kg for BMI >28 Women  ?17 kg for BMI ?23  ?17.3 kg for BMI 23.1-26  ?18 kg for BMI 26.1-29  ?21 kg for BMI >29    Equipment: Olayinka hand dynamometer  Participant attempts to squeeze the dynamometer maximally 3 times with the dominant hand.   Average of 3 trials: 12 kg with BMI of 30.4  Meets criteria for frailty based on handgrip strength: yes    Labs  Recent Labs   Lab Test  08/27/18   0820  04/05/17   1132   05/10/11   0935   CHOL  137   --    --   186   HDL  20*   --    --   40   LDL  87  66   < >  119   TRIG  151*   --    --   137   CHOLHDLRATIO   --    --    --   4.7    < > = values in this interval not displayed.       Malnutrition  % Intake: difficult to determine if meets criteria for malnutrition   % Weight Loss: difficult to determine if meets criteria for malnutrition with likely combination of fluid/dry losses   Subcutaneous Fat Loss: Mild   Muscle Loss: None  Fluid Accumulation/Edema: Mild   Malnutrition Diagnosis: Non-Severe malnutrition in the context of chronic illness     Estimated Nutrition Needs  Energy  [id number]     (25-30 kcal/kg for maintenance)   Protein  45-56+    (0.8-1+ g/kg for maintenance)         Fluid  1 ml/kcal or per MD        Micronutrient   Na+: <2000 mg/day            Nutrition Diagnosis  Food and nutrition related knowledge deficit r/t pre liver transplant eval AEB pt verbalized not hearing pre/post transplant diet guidelines.    Nutrition Intervention  Nutrition education provided:  Discussed sodium intake (low sodium foods and drinks, seasoning food without salt and tips for low sodium diet). Did encourage pt's wife to keep an eye on and limit amount of added table salt and fish sauce, etc to meals. Explained rationale for lower sodium diet.     Reviewed adequate protein intake. Encouraged receiving protein from both animal and plant based sources. Pt is consuming a very low  protein diet now. If he is not apt to eat many protein-rich foods currently, recommended he try a protein bar or another brand of protein shake. Pt's daughter has purchased ONS for pt in the past, so she may be willing to help find another product that pt is willing to take consistently. Encouraged taking 2 products/day to start with.     Reviewed post txp diet guidelines in brief (will review in further detail post txp):  (1) Review of proper food safety measures d/t immunosuppressant therapy post-op and increased risk for food-borne illness    (2) Avoid the following post txp d/t risk for rejection, unknown effects on the organs, and/or potential interactions with immunosuppressants:  - Herbal, Chinese, holistic, chiropractic, natural, alternative medicines and supplements  - Detoxes and cleanses  - Weight loss pills  - Protein powders or other products with extracts or herbs (ie green tea extract)    (3) Med regimen and possible side effects    Patient Understanding: Pt verbalized understanding of education provided.  Expected Compliance: Good  Follow-Up Plans: PRN     Nutrition Goals  1. Limit Na+ <2000mg/day  2. Pt to verbalize understanding of 3 aspects of post txp education provided  3. Ideally take 2 protein supplements/day     Provided pt with contact info.   Terri Bryant RD, LD  Pgr [phone]

## 2018-08-28 NOTE — NURSING NOTE
"Chief Complaint   Patient presents with     Transplant Evaluation     Pre-tx liver     Blood pressure 102/65, pulse 74, temperature 97.7  F (36.5  C), height 1.549 m (5' 1\"), weight 72.9 kg (160 lb 12.8 oz), SpO2 94 %.    Justin Faith CMA    "

## 2018-08-28 NOTE — LETTER
8/28/2018       RE: Junior Patricia  [address]  [place] [address]     Dear Colleague,    Thank you for referring your patient, Junior Patricia, to the [facility] SOLID ORGAN TRANSPLANT at [facility]. Please see a copy of my visit note below.    Assessment and Plan:  1. Liver/probably kidney transplant evaluation - patient is a fair candidate overall. Benefits and surgical risks of a liver transplantation were discussed.  2.  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  Talk with IR re: ways to dx. Whether he has HCCa.   Also needs assessment re: viral control.  3. CKD 5, sp prior LDKT in 2004.  Now looks like failed.  Get US for cortical thickness and size.  UA no proteinuria.   4. With long-term CKD, needs cardiac eval for ischemia.  He denies any cardiac hx.    Surgical evaluation:  1. Portal Vein:Patent  2. Hepatic Artery: Open  3. TIPS: absent  4. Previous Abdominal Surgery: No  5. Hepatocellular Carcinoma: maybe, lesions on ultrasound  6. Ascites: Present - large amount  7. Costal Angle: wide  8. Portopulmonary Hypertension: unknown  9. Hepatopulmonary Syndrome: absent  10. Cardiac Evaluation: needs stress echocardiogram  11. Nutritional Status: Moderate  12. Diabetes: no  13.Hypertension no  14. Smoker:unclear  115: Fraility index:min      Recommendations: needs discussion re: liver./kidney in 67 yo man with preexisting ESRD/txp.  Not sure how well controlled his HBV is, last HBV DNA log >8.23 in 2015.  No other results in care everywhere or [facility].      Patients overall evaluation will be discussed at the Liver Transplant selection committee meeting with a final recommendation on the patients suitability for transplant to be made at that time.    Consult Full  Details:  Junior Patricia was seen in consultation at the request of Dr. Johnson for evaluation as a potential liver/kidney transplant recipient.    Reason for Visit:  uJnior Patricia is a 68  year old year old male with hepatitis B , who presents for liver transplant evaluation.    HPI:  Presenting complaint: Abdominal distention    Past Medical History:   Diagnosis Date     Ascites      CKD (chronic kidney disease) stage 3, GFR 30-59 ml/min 4/28/2011     Colon polyps 5/24/2011     DM type 2, goal A1C below 8.0 8/4/2014     Elevated LFT's 5/11/2011     GOUT NOS 9/20/2007     Hyperlipidemia LDL goal <100 10/31/2010     Kidney replaced by transplant     done at [facility]     Macrocytosis without anemia 2/26/2016     Nephrolithiasis      Obesity, unspecified      SBP (spontaneous bacterial peritonitis) (H) 2/2018,5/2018,8/2018     Past Surgical History:   Procedure Laterality Date     KNEE SURGERY       TRANSPLANT KIDNEY RECIPIENT LIVING RELATED      uric acid nephropathy     Past Surgical History:   Procedure Laterality Date     KNEE SURGERY       TRANSPLANT KIDNEY RECIPIENT LIVING RELATED      uric acid nephropathy     No family history on file.  Allergies   Allergen Reactions     Lisinopril Swelling     No Known Drug Allergies      Simvastatin Itching     Prior to Admission medications    Medication Sig Start Date End Date Taking? Authorizing Provider   aspirin 81 MG EC tablet Take 81 mg by mouth daily    Unknown, Entered By History   cycloSPORINE modified (GENERIC EQUIVALENT) 25 MG capsule Take 25 mg by mouth every evening    Unknown, Entered By History   diltiazem (DILTIAZEM CD) 120 MG 24 hr capsule Take 120 mg by mouth daily    Unknown, Entered By History   furosemide (LASIX) 20 MG tablet Take 1 tablet (20 mg) by mouth daily 10/25/17   Lenin Obrien PA-C   metoprolol (LOPRESSOR) 25 MG tablet Take 12.5 mg by mouth 2 times daily     Reported, Patient   mycophenolate (CELLCEPT) 500 MG tablet Take 1,000 mg by mouth 2 times daily     Reported, Patient   predniSONE (DELTASONE) 5 MG tablet Take 5 mg by mouth daily    Unknown, Entered By History   PROAIR  (90 BASE) MCG/ACT inhaler INHALE  2 PUFFS BY MOUTH EVERY 6 HOURS AS NEEDED FOR SHORTNESS OF BREATH/DYPSNEA/WHEEZING 1/2/18   Lenin Obrien PA-C   spironolactone (ALDACTONE) 50 MG tablet Take 1 tablet (50 mg) by mouth daily 10/25/17   Lenin Obrien PA-C       Previous Transplant Hx: Yes -  [facility] , kidney  Cardiovascular Hx:       h/o Cardiac Issues: No       Exercise Tolerance: no chest pain or shortness of breath with exertion.    Potential Donor(s): No    ROS:    REVIEW OF SYSTEMS (check box if normal)  [x]                GENERAL  [x]                  PULMONARY [x]                 GENITOURINARY  [x]                 CNS                 [x]                  CARDIAC  [x]                  ENDOCRINE  [x]                 EARS,NOSE,THROAT [x]                  GASTROINTESTINAL [x]                  NEUROLOGIC    [x]                 MUSCLOSKELTAL  [x]                   HEMATOLOGY    Examination:     Vitals:  There were no vitals taken for this visit.    GENERAL APPEARANCE: alert and no distress.  Spoke through an .  EYES: PERRL  HENT: mouth without ulcers or lesions  NECK: supple, no adenopathy  RESP: lungs clear to auscultation - no rales, rhonchi or wheezes  CV: regular rhythm, normal rate, no rub   ABDOMEN:  soft, nontender, no HSM or masses and bowel sounds normal  Distended, with gross ascites.  MS: extremities normal- no gross deformities noted, no evidence of inflammation in joints, no muscle tenderness  SKIN: no rash  NEURO: Normal strength and tone, sensory exam grossly normal, mentation intact and speech normal  PSYCH: mentation appears normal. and affect normal/bright      Results:   Recent Results (from the past 168 hour(s))   ABO/Rh type and screen    Collection Time: 08/27/18  6:19 AM   Result Value Ref Range    ABO O     RH(D) Pos     Antibody Screen Neg     Test Valid Only At          [facility]    Specimen Expires 08/30/2018    Iron and iron binding capacity     Collection Time: 08/27/18  6:19 AM   Result Value Ref Range    Iron 122 35 - 180 ug/dL    Iron Binding Cap 166 (L) 240 - 430 ug/dL    Iron Saturation Index 73 (H) 15 - 46 %   INR    Collection Time: 08/27/18  6:19 AM   Result Value Ref Range    INR 1.20 (H) 0.86 - 1.14   CBC with platelets    Collection Time: 08/27/18  6:19 AM   Result Value Ref Range    WBC 8.4 4.0 - 11.0 10e9/L    RBC Count 2.83 (L) 4.4 - 5.9 10e12/L    Hemoglobin 9.8 (L) 13.3 - 17.7 g/dL    Hematocrit 28.7 (L) 40.0 - 53.0 %     (H) 78 - 100 fl    MCH 34.6 (H) 26.5 - 33.0 pg    MCHC 34.1 31.5 - 36.5 g/dL    RDW 14.1 10.0 - 15.0 %    Platelet Count 49 (LL) 150 - 450 10e9/L   EKG 12-lead, tracing only [EKG1]    Collection Time: 08/27/18  6:43 AM   Result Value Ref Range    Interpretation ECG Click View Image link to view waveform and result    UA reflex to Microscopic and Culture    Collection Time: 08/27/18  7:31 AM   Result Value Ref Range    Color Urine Yellow     Appearance Urine Clear     Glucose Urine Negative NEG^Negative mg/dL    Bilirubin Urine Negative NEG^Negative    Ketones Urine Negative NEG^Negative mg/dL    Specific Gravity Urine 1.014 1.003 - 1.035    Blood Urine Negative NEG^Negative    pH Urine 5.0 5.0 - 7.0 pH    Protein Albumin Urine Negative NEG^Negative mg/dL    Urobilinogen mg/dL 0.0 0.0 - 2.0 mg/dL    Nitrite Urine Negative NEG^Negative    Leukocyte Esterase Urine Negative NEG^Negative    Source Midstream Urine    Ethyl Glucuronide Urine    Collection Time: 08/27/18  7:31 AM   Result Value Ref Range    Ethyl Glucuronide Urine NEGATIVE not reported   Protein  random urine with Creat Ratio    Collection Time: 08/27/18  7:31 AM   Result Value Ref Range    Protein Random Urine 0.18 g/L    Protein Total Urine g/gr Creatinine 0.14 0 - 0.2 g/g Cr   Creatinine urine calculation only    Collection Time: 08/27/18  7:31 AM   Result Value Ref Range    Creatinine Urine 128 mg/dL   Lipid Profile    Collection Time: 08/27/18  8:20 AM    Result Value Ref Range    Cholesterol 137 <200 mg/dL    Triglycerides 151 (H) <150 mg/dL    HDL Cholesterol 20 (L) >39 mg/dL    LDL Cholesterol Calculated 87 <100 mg/dL    Non HDL Cholesterol 117 <130 mg/dL   Basic metabolic panel    Collection Time: 08/27/18  8:20 AM   Result Value Ref Range    Sodium 127 (L) 133 - 144 mmol/L    Potassium 3.7 3.4 - 5.3 mmol/L    Chloride 86 (L) 94 - 109 mmol/L    Carbon Dioxide 27 20 - 32 mmol/L    Anion Gap 14 3 - 14 mmol/L    Glucose 288 (H) 70 - 99 mg/dL    Urea Nitrogen 105 (H) 7 - 30 mg/dL    Creatinine 4.87 (H) 0.66 - 1.25 mg/dL    GFR Estimate 12 (L) >60 mL/min/1.7m2    GFR Estimate If Black 15 (L) >60 mL/min/1.7m2    Calcium 8.7 8.5 - 10.1 mg/dL   Hepatic panel    Collection Time: 08/27/18  8:20 AM   Result Value Ref Range    Bilirubin Direct 0.4 (H) 0.0 - 0.2 mg/dL    Bilirubin Total 0.7 0.2 - 1.3 mg/dL    Albumin 2.3 (L) 3.4 - 5.0 g/dL    Protein Total 5.9 (L) 6.8 - 8.8 g/dL    Alkaline Phosphatase 253 (H) 40 - 150 U/L    ALT 51 0 - 70 U/L    AST 70 (H) 0 - 45 U/L   AFP tumor marker    Collection Time: 08/27/18  8:20 AM   Result Value Ref Range    Alpha Fetoprotein 7.5 0 - 8 ug/L   Phosphorus    Collection Time: 08/27/18  8:20 AM   Result Value Ref Range    Phosphorus 6.6 (H) 2.5 - 4.5 mg/dL   Prostate spec antigen screen    Collection Time: 08/27/18  8:20 AM   Result Value Ref Range    PSA 0.09 0 - 4 ug/L   TSH with free T4 reflex    Collection Time: 08/27/18  8:20 AM   Result Value Ref Range    TSH 47.04 (H) 0.40 - 4.00 mU/L   Transferrin    Collection Time: 08/27/18  8:20 AM   Result Value Ref Range    Transferrin 147 (L) 210 - 360 mg/dL   Vitamin D Deficiency    Collection Time: 08/27/18  8:20 AM   Result Value Ref Range    Vitamin D Deficiency screening 33 20 - 75 ug/L   CMV Antibody IgG    Collection Time: 08/27/18  8:20 AM   Result Value Ref Range    CMV Antibody IgG >8.0 (H) 0.0 - 0.8 AI   EBV Capsid Antibody IgG    Collection Time: 08/27/18  8:20 AM   Result  Value Ref Range    EBV Capsid Antibody IgG 7.7 (H) 0.0 - 0.8 AI   Hepatitis A Antibody IgG    Collection Time: 08/27/18  8:20 AM   Result Value Ref Range    Hepatitis A Antibody IgG Reactive (AA) NR^Nonreactive   HIV Antigen Antibody Combo Pretransplant    Collection Time: 08/27/18  8:20 AM   Result Value Ref Range    HIV Antigen Antibody Combo Pretransplant Nonreactive NR^Nonreactive   Treponema Abs w Reflex to RPR and Titer    Collection Time: 08/27/18  8:20 AM   Result Value Ref Range    Treponema Antibodies Nonreactive NR^Nonreactive   HLA Typing Complete SOT Recipient    Collection Time: 08/27/18  8:20 AM   Result Value Ref Range    HLA Typing Complete SOT Recipient       Specimen received - Immunology report to follow upon completion.   PRA Single Antigen IgG Antibody    Collection Time: 08/27/18  8:20 AM   Result Value Ref Range    PRA Single Antigen IgG Antibody       Specimen received - Immunology report to follow upon completion.   HEPATITIS C ANTIBODY    Collection Time: 08/27/18  8:20 AM   Result Value Ref Range    Hepatitis C Antibody Reactive (AA) NR^Nonreactive   T4 free    Collection Time: 08/27/18  8:20 AM   Result Value Ref Range    T4 Free 0.25 (L) 0.76 - 1.46 ng/dL   Antibody titer red cell    Collection Time: 08/27/18  8:45 AM   Result Value Ref Range    Antibody Titer       ANTI A : IgM 32 , IgG 128  ANTI B : IgM 64 , IgG 64     US Abdomen Complete w Doppler Complete    Collection Time: 08/27/18  9:20 AM   Result Value Ref Range    Radiologist flags New liver lesions. Consider liver MRI.    ABO type [[id number]]    Collection Time: 08/27/18 12:27 PM   Result Value Ref Range    ABO O     RH(D) Pos     Specimen Expires 08/30/2018      I had a long discussion with the patient regarding liver transplantation which included but was not limited to  the following points:    1. Liver transplant selection committee process.  2. The federal rules for cadaveric waiting list, the size and blood type matching  of the organ. The availability of living-related donor transplantation.  3. The types of donors: brain death donors, non-heart beating donors, partial liver grafts: splits and living donor grafts  4. Extended criteria  Donors (older age, steasosis) and the increased  risk of primary non-function using the extended criteria donors  5. The CDC high risk donors,  Risk of donor transmitted infections and donor transmitted malignancy  6. The liver transplant operation and the associated risks and technical complications which can include intraoperative death, post operative death,  Primary non-function, bleeding requiring re-operations, arterial and biliary complications, bowel perforations, and intra abdominal abscess. Some of these complicaitons may require a second operation.  7. The postoperative course, the ICU stay and risk of postoperative complications which can include sepsis, MI, stroke, brain injury, pneumonia, pleural effusions, and renal dysfunction.  8. The current 1 year and 5 year graft and patient survivals.  9. The need for life long immunosuppressive therapy and the side effects of these medications, including the possibility of toxicity, opportunistic infections, risk of cancer including lymphoma, and the possibility of rejection even if the patient is taking the medication exactly as prescribed.  10. The need for compliance with medications and follow-up visits in the clinic and thereafter.  11. The patient and family understand these risks and wish to proceed to transplantation       I spent 45 minutes with the patient and more than 50% of the time was spend in direct face to face counseling.      Again, thank you for allowing me to participate in the care of your patient.      Sincerely,    Tre Oconnor MD

## 2018-08-28 NOTE — MR AVS SNAPSHOT
After Visit Summary   8/28/2018    Junior Patricia    MRN: [id number]           Patient Information     Date Of Birth          1950        Visit Information        Provider Department      8/28/2018 8:00 AM Emerita Quintero MD [facility] Hepatology        Today's Diagnoses     Chronic hepatitis B (H)           Follow-ups after your visit        Your next 10 appointments already scheduled     Sep 07, 2018 10:00 AM CDT   NM INJECTION with UUNMINJ1   [facility], Nuclear Medicine ([facility])    [address] [address]   [phone]            Sep 07, 2018 10:45 AM CDT   NM SCAN3 with UUNM1   [facility], Nuclear Medicine ([facility])    [address] [address] [phone]            Sep 07, 2018 11:15 AM CDT   Ekg Stress Nm Lexiscan with UUEKGNMS   [facility] ([facility])    [address] [place] [id number]               Sep 07, 2018 12:15 PM CDT   NM MPI WITH LEXISCAN with UUNM1   [facility], Nuclear Medicine ([facility])    [address] [address] [phone]           For a ONE day exam: Allow 3-4 hours for test. For a TWO day exam: Allow  minutes PER day for test.  On the day of your resting scan: Please stop eating 3 hours before the test. You may drink water or juice.  On the day of your stress test: Stop all caffeine 12 hours before the test. This includes coffee, tea, soda pop, chocolate and certain medicines (such as Anacin, Excedrin and NoDoz). Also avoid decaf coffee and tea, as these contain small amounts of caffeine.  Stop eating 3 hours before the test. You may drink water.  You may need to stop some medicines before the test. Follow your doctor s orders. - If you take a beta blocker: Do not take your  beta-blocker on the day before your test, unless specifically told to by your doctor. And do not take it on the day of your test. Bring it with you to take after the test. - If you take Aggrenox or dipyridamole (Persantine, Permole), stop taking it 48 hours before your test. - If you take Viagra, Cialis or Levitra, stop taking it 48 hours before your test. - If you take theophylline or aminophylline, stop taking it 12 hours before your test.  Do not take nitrates on the day of your test. Do not wear your Nitro-Patch.  Please wear a loose two-piece outfit. If you will have an exercise test, bring rubber-soled walking shoes.  When you arrive, please tell us if you have a pacemaker or ICD (implantable defibrillator).  Please call your Imaging Department at your exam site with any questions.            Sep 07, 2018  5:00 PM CDT   MR ABDOMEN W/O CONTRAST with UUMR1   [facility] [place], [facility] ([facility])    [address] [facility] [address] [phone]           Take your medicines as usual, unless your doctor tells you not to. Bring a list of your current medicines to your exam (including vitamins, minerals and over-the-counter drugs). Also bring the results of similar scans you may have had.  Please remove any body piercings and hair extensions before you arrive.  Follow your doctor s orders. If you do not, we may have to postpone your exam.  For liver, gallbladder, or pancreas exams: No food or drink for 6 hours before the exam. For all other exams there are no food or drink restrictions.  The MRI machine uses a strong magnet. Please wear clothes without metal (snaps, zippers). A sweatsuit works well, or we may give you a hospital gown.  **IMPORTANT** THE INSTRUCTIONS BELOW ARE ONLY FOR THOSE PATIENTS WHO HAVE BEEN PRESCRIBED SEDATION OR GENERAL ANESTHESIA DURING THEIR MRI PROCEDURE:  IF YOUR DOCTOR PRESCRIBED ORAL SEDATION (take medicine to help you  relax during your exam):   You must get the medicine from your doctor (oral medication) before you arrive. Bring the medicine to the exam. Do not take it at home. You ll be told when to take it upon arriving for your exam.   Arrive one hour early. Bring someone who can take you home after the test. Your medicine will make you sleepy. After the exam, you may not drive, take a bus or take a taxi by yourself.  IF YOUR DOCTOR PRESCRIBED IV SEDATION:   Arrive one hour early. Bring someone who can take you home after the test. Your medicine will make you sleepy. After the exam, you may not drive, take a bus or take a taxi by yourself.   No eating 6 hours before your exam. You may have clear liquids up until 4 hours before your exam. (Clear liquids include water, clear tea, black coffee and fruit juice without pulp.)  IF YOUR DOCTOR PRESCRIBED ANESTHESIA (be asleep for your exam):   Arrive 1 1/2 hours early. Bring someone who can take you home after the test. You may not drive, take a bus or take a taxi by yourself.   No eating 8 hours before your exam. You may have clear liquids up until 4 hours before your exam. (Clear liquids include water, clear tea, black coffee and fruit juice without pulp.)   You will spend four to five hours in the recovery room.  Please call the Imaging Department at your exam site with any questions.              Who to contact     If you have questions or need follow up information about today's clinic visit or your schedule please contact [facility] HEPATOLOGY directly at [phone].  Normal or non-critical lab and imaging results will be communicated to you by MyChart, letter or phone within 4 business days after the clinic has received the results. If you do not hear from us within 7 days, please contact the clinic through [employer] or phone. If you have a critical or abnormal lab result, we will notify you by phone as soon as possible.  Submit refill requests through MyWishBoard or call your  "pharmacy and they will forward the refill request to us. Please allow 3 business days for your refill to be completed.          Additional Information About Your Visit        MyChart Information     [employer] lets you send messages to your doctor, view your test results, renew your prescriptions, schedule appointments and more. To sign up, go to [email or web address] . Click on \"Log in\" on the left side of the screen, which will take you to the Welcome page. Then click on \"Sign up Now\" on the right side of the page.     You will be asked to enter the access code listed below, as well as some personal information. Please follow the directions to create your username and password.     Your access code is: [email or web address]  Expires: 2018  6:30 AM     Your access code will  in 90 days. If you need help or a new code, please call your [facility] clinic or [phone].        Care EveryWhere ID     This is your Care EveryWhere ID. This could be used by other organizations to access your [facility] medical records  [id number]        Your Vitals Were     Pulse Temperature Height Pulse Oximetry BMI (Body Mass Index)       74 97.7  F (36.5  C) 1.549 m (5' 1\") 94% 30.38 kg/m2        Blood Pressure from Last 3 Encounters:   18 101/61   18 102/65   18 114/70    Weight from Last 3 Encounters:   18 69.9 kg (154 lb)   18 72.9 kg (160 lb 12.8 oz)   18 70.8 kg (156 lb)              Today, you had the following     No orders found for display       Primary Care Provider Office Phone # Fax #    [facility] [phone] [address] [facility] [address]        Equal Access to Services     [place] : Razia Abrams, anjelica castro, qaisabela kaaljaqueline rossi, rigoberto swartz. So [facility] [phone].    ATENCIÓN: Si habla español, tiene a murdock disposición servicios gratuitos de asistencia lingüística. Llame al " [phone].    We comply with applicable federal civil rights laws and [place] laws. We do not discriminate on the basis of race, color, national origin, age, disability, sex, sexual orientation, or gender identity.            Thank you!     Thank you for choosing [facility] HEPATOLOGY  for your care. Our goal is always to provide you with excellent care. Hearing back from our patients is one way we can continue to improve our services. Please take a few minutes to complete the written survey that you may receive in the mail after your visit with us. Thank you!             Your Updated Medication List - Protect others around you: Learn how to safely use, store and throw away your medicines at www.disposemymeds.org.          This list is accurate as of 8/28/18 11:59 PM.  Always use your most recent med list.                   Brand Name Dispense Instructions for use Diagnosis    aspirin 81 MG EC tablet      Take 81 mg by mouth daily        CELLCEPT 500 MG tablet      Take 1,000 mg by mouth 2 times daily        cycloSPORINE modified 25 MG capsule    GENERIC EQUIVALENT     Take 25 mg by mouth every evening        DILTIAZEM  MG 24 hr capsule   Generic drug:  diltiazem      Take 120 mg by mouth daily        furosemide 20 MG tablet    LASIX    30 tablet    Take 1 tablet (20 mg) by mouth daily    Cirrhosis of liver with ascites, unspecified hepatic cirrhosis type (H)       metoprolol tartrate 25 MG tablet    LOPRESSOR     Take 12.5 mg by mouth 2 times daily        predniSONE 5 MG tablet    DELTASONE     Take 5 mg by mouth daily        PROAIR  (90 Base) MCG/ACT inhaler   Generic drug:  albuterol     8.5 g    INHALE 2 PUFFS BY MOUTH EVERY 6 HOURS AS NEEDED FOR SHORTNESS OF BREATH/DYPSNEA/WHEEZING    Exacerbation of asthma, unspecified asthma severity, unspecified whether persistent       spironolactone 50 MG tablet    ALDACTONE    30 tablet    Take 1 tablet (50 mg) by mouth daily    Cirrhosis of liver with  ascites, unspecified hepatic cirrhosis type (H)

## 2018-08-28 NOTE — PROGRESS NOTES
Assessment and Plan:  1. Liver/probably kidney transplant evaluation - patient is a fair candidate overall. Benefits and surgical risks of a liver transplantation were discussed.  2.  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  Talk with IR re: ways to dx. Whether he has HCCa.   Also needs assessment re: viral control.  3. CKD 5, sp prior LDKT in 2004.  Now looks like failed.  Get US for cortical thickness and size.  UA no proteinuria.   4. With long-term CKD, needs cardiac eval for ischemia.  He denies any cardiac hx.    Surgical evaluation:  1. Portal Vein:Patent  2. Hepatic Artery: Open  3. TIPS: absent  4. Previous Abdominal Surgery: No  5. Hepatocellular Carcinoma: maybe, lesions on ultrasound  6. Ascites: Present - large amount  7. Costal Angle: wide  8. Portopulmonary Hypertension: unknown  9. Hepatopulmonary Syndrome: absent  10. Cardiac Evaluation: needs stress echocardiogram  11. Nutritional Status: Moderate  12. Diabetes: no  13.Hypertension no  14. Smoker:unclear  115: Fraility index:min      Recommendations: needs discussion re: liver./kidney in 69 yo man with preexisting ESRD/txp.  Not sure how well controlled his HBV is, last HBV DNA log >8.23 in 2015.  No other results in care everywhere or [facility].      Patients overall evaluation will be discussed at the Liver Transplant selection committee meeting with a final recommendation on the patients suitability for transplant to be made at that time.    Consult Full  Details:  Junior Patricia was seen in consultation at the request of Dr. Johnson for evaluation as a potential liver/kidney transplant recipient.    Reason for Visit:  Junior Patricia is a 68 year old year old male with hepatitis B , who presents for liver transplant evaluation.    HPI:  Presenting complaint: Abdominal distention    Past Medical History:   Diagnosis Date     Ascites      CKD (chronic kidney disease) stage 3, GFR 30-59 ml/min 4/28/2011     Colon polyps  5/24/2011     DM type 2, goal A1C below 8.0 8/4/2014     Elevated LFT's 5/11/2011     GOUT NOS 9/20/2007     Hyperlipidemia LDL goal <100 10/31/2010     Kidney replaced by transplant     done at [facility]     Macrocytosis without anemia 2/26/2016     Nephrolithiasis      Obesity, unspecified      SBP (spontaneous bacterial peritonitis) (H) 2/2018,5/2018,8/2018     Past Surgical History:   Procedure Laterality Date     KNEE SURGERY       TRANSPLANT KIDNEY RECIPIENT LIVING RELATED      uric acid nephropathy     Past Surgical History:   Procedure Laterality Date     KNEE SURGERY       TRANSPLANT KIDNEY RECIPIENT LIVING RELATED      uric acid nephropathy     No family history on file.  Allergies   Allergen Reactions     Lisinopril Swelling     No Known Drug Allergies      Simvastatin Itching     Prior to Admission medications    Medication Sig Start Date End Date Taking? Authorizing Provider   aspirin 81 MG EC tablet Take 81 mg by mouth daily    Unknown, Entered By History   cycloSPORINE modified (GENERIC EQUIVALENT) 25 MG capsule Take 25 mg by mouth every evening    Unknown, Entered By History   diltiazem (DILTIAZEM CD) 120 MG 24 hr capsule Take 120 mg by mouth daily    Unknown, Entered By History   furosemide (LASIX) 20 MG tablet Take 1 tablet (20 mg) by mouth daily 10/25/17   Lenin Obrien PA-C   metoprolol (LOPRESSOR) 25 MG tablet Take 12.5 mg by mouth 2 times daily     Reported, Patient   mycophenolate (CELLCEPT) 500 MG tablet Take 1,000 mg by mouth 2 times daily     Reported, Patient   predniSONE (DELTASONE) 5 MG tablet Take 5 mg by mouth daily    Unknown, Entered By History   PROAIR  (90 BASE) MCG/ACT inhaler INHALE 2 PUFFS BY MOUTH EVERY 6 HOURS AS NEEDED FOR SHORTNESS OF BREATH/DYPSNEA/WHEEZING 1/2/18   Lenin Obrien PA-C   spironolactone (ALDACTONE) 50 MG tablet Take 1 tablet (50 mg) by mouth daily 10/25/17   Lenin Obrien PA-C       [place] Hx: [place]  [facility] [place]  Cardiovascular Hx:       h/o Cardiac Issues: No       Exercise Tolerance: no chest pain or shortness of breath with exertion.    Potential Donor(s): No    ROS:    REVIEW OF SYSTEMS (check box if normal)  [x]                GENERAL  [x]                  PULMONARY [x]                 GENITOURINARY  [x]                 CNS                 [x]                  CARDIAC  [x]                  ENDOCRINE  [x]                 EARS,NOSE,THROAT [x]                  GASTROINTESTINAL [x]                  NEUROLOGIC    [x]                 MUSCLOSKELTAL  [x]                   HEMATOLOGY    Examination:     Vitals:  There were no vitals taken for this visit.    GENERAL APPEARANCE: alert and no distress.  Spoke through an .  EYES: PERRL  HENT: mouth without ulcers or lesions  NECK: supple, no adenopathy  RESP: lungs clear to auscultation - no rales, rhonchi or wheezes  CV: regular rhythm, normal rate, no rub   ABDOMEN:  soft, nontender, no HSM or masses and bowel sounds normal  Distended, with gross ascites.  MS: extremities normal- no gross deformities noted, no evidence of inflammation in joints, no muscle tenderness  SKIN: no rash  NEURO: Normal strength and tone, sensory exam grossly normal, mentation intact and speech normal  PSYCH: mentation appears normal. and affect normal/bright      Results:   Recent Results (from the past 168 hour(s))   ABO/Rh type and screen    Collection Time: 08/27/18  6:19 AM   Result Value Ref Range    ABO O     RH(D) Pos     Antibody Screen Neg     Test Valid Only At          [facility]    Specimen Expires 08/30/2018    Iron and iron binding capacity    Collection Time: 08/27/18  6:19 AM   Result Value Ref Range    Iron 122 35 - 180 ug/dL    Iron Binding Cap 166 (L) 240 - 430 ug/dL    Iron Saturation Index 73 (H) 15 - 46 %   INR    Collection Time: 08/27/18  6:19 AM   Result Value Ref Range    INR 1.20 (H) 0.86 - 1.14   CBC with  platelets    Collection Time: 08/27/18  6:19 AM   Result Value Ref Range    WBC 8.4 4.0 - 11.0 10e9/L    RBC Count 2.83 (L) 4.4 - 5.9 10e12/L    Hemoglobin 9.8 (L) 13.3 - 17.7 g/dL    Hematocrit 28.7 (L) 40.0 - 53.0 %     (H) 78 - 100 fl    MCH 34.6 (H) 26.5 - 33.0 pg    MCHC 34.1 31.5 - 36.5 g/dL    RDW 14.1 10.0 - 15.0 %    Platelet Count 49 (LL) 150 - 450 10e9/L   EKG 12-lead, tracing only [EKG1]    Collection Time: 08/27/18  6:43 AM   Result Value Ref Range    Interpretation ECG Click View Image link to view waveform and result    UA reflex to Microscopic and Culture    Collection Time: 08/27/18  7:31 AM   Result Value Ref Range    Color Urine Yellow     Appearance Urine Clear     Glucose Urine Negative NEG^Negative mg/dL    Bilirubin Urine Negative NEG^Negative    Ketones Urine Negative NEG^Negative mg/dL    Specific Gravity Urine 1.014 1.003 - 1.035    Blood Urine Negative NEG^Negative    pH Urine 5.0 5.0 - 7.0 pH    Protein Albumin Urine Negative NEG^Negative mg/dL    Urobilinogen mg/dL 0.0 0.0 - 2.0 mg/dL    Nitrite Urine Negative NEG^Negative    Leukocyte Esterase Urine Negative NEG^Negative    Source Midstream Urine    Ethyl Glucuronide Urine    Collection Time: 08/27/18  7:31 AM   Result Value Ref Range    Ethyl Glucuronide Urine NEGATIVE not reported   Protein  random urine with Creat Ratio    Collection Time: 08/27/18  7:31 AM   Result Value Ref Range    Protein Random Urine 0.18 g/L    Protein Total Urine g/gr Creatinine 0.14 0 - 0.2 g/g Cr   Creatinine urine calculation only    Collection Time: 08/27/18  7:31 AM   Result Value Ref Range    Creatinine Urine 128 mg/dL   Lipid Profile    Collection Time: 08/27/18  8:20 AM   Result Value Ref Range    Cholesterol 137 <200 mg/dL    Triglycerides 151 (H) <150 mg/dL    HDL Cholesterol 20 (L) >39 mg/dL    LDL Cholesterol Calculated 87 <100 mg/dL    Non HDL Cholesterol 117 <130 mg/dL   Basic metabolic panel    Collection Time: 08/27/18  8:20 AM   Result  Value Ref Range    Sodium 127 (L) 133 - 144 mmol/L    Potassium 3.7 3.4 - 5.3 mmol/L    Chloride 86 (L) 94 - 109 mmol/L    Carbon Dioxide 27 20 - 32 mmol/L    Anion Gap 14 3 - 14 mmol/L    Glucose 288 (H) 70 - 99 mg/dL    Urea Nitrogen 105 (H) 7 - 30 mg/dL    Creatinine 4.87 (H) 0.66 - 1.25 mg/dL    GFR Estimate 12 (L) >60 mL/min/1.7m2    GFR Estimate If Black 15 (L) >60 mL/min/1.7m2    Calcium 8.7 8.5 - 10.1 mg/dL   Hepatic panel    Collection Time: 08/27/18  8:20 AM   Result Value Ref Range    Bilirubin Direct 0.4 (H) 0.0 - 0.2 mg/dL    Bilirubin Total 0.7 0.2 - 1.3 mg/dL    Albumin 2.3 (L) 3.4 - 5.0 g/dL    Protein Total 5.9 (L) 6.8 - 8.8 g/dL    Alkaline Phosphatase 253 (H) 40 - 150 U/L    ALT 51 0 - 70 U/L    AST 70 (H) 0 - 45 U/L   AFP tumor marker    Collection Time: 08/27/18  8:20 AM   Result Value Ref Range    Alpha Fetoprotein 7.5 0 - 8 ug/L   Phosphorus    Collection Time: 08/27/18  8:20 AM   Result Value Ref Range    Phosphorus 6.6 (H) 2.5 - 4.5 mg/dL   Prostate spec antigen screen    Collection Time: 08/27/18  8:20 AM   Result Value Ref Range    PSA 0.09 0 - 4 ug/L   TSH with free T4 reflex    Collection Time: 08/27/18  8:20 AM   Result Value Ref Range    TSH 47.04 (H) 0.40 - 4.00 mU/L   Transferrin    Collection Time: 08/27/18  8:20 AM   Result Value Ref Range    Transferrin 147 (L) 210 - 360 mg/dL   Vitamin D Deficiency    Collection Time: 08/27/18  8:20 AM   Result Value Ref Range    Vitamin D Deficiency screening 33 20 - 75 ug/L   CMV Antibody IgG    Collection Time: 08/27/18  8:20 AM   Result Value Ref Range    CMV Antibody IgG >8.0 (H) 0.0 - 0.8 AI   EBV Capsid Antibody IgG    Collection Time: 08/27/18  8:20 AM   Result Value Ref Range    EBV Capsid Antibody IgG 7.7 (H) 0.0 - 0.8 AI   Hepatitis A Antibody IgG    Collection Time: 08/27/18  8:20 AM   Result Value Ref Range    Hepatitis A Antibody IgG Reactive (AA) NR^Nonreactive   HIV Antigen Antibody Combo Pretransplant    Collection Time: 08/27/18   8:20 AM   Result Value Ref Range    HIV Antigen Antibody Combo Pretransplant Nonreactive NR^Nonreactive   Treponema Abs w Reflex to RPR and Titer    Collection Time: 08/27/18  8:20 AM   Result Value Ref Range    Treponema Antibodies Nonreactive NR^Nonreactive   HLA Typing Complete SOT Recipient    Collection Time: 08/27/18  8:20 AM   Result Value Ref Range    HLA Typing Complete SOT Recipient       Specimen received - Immunology report to follow upon completion.   PRA Single Antigen IgG Antibody    Collection Time: 08/27/18  8:20 AM   Result Value Ref Range    PRA Single Antigen IgG Antibody       Specimen received - Immunology report to follow upon completion.   HEPATITIS C ANTIBODY    Collection Time: 08/27/18  8:20 AM   Result Value Ref Range    Hepatitis C Antibody Reactive (AA) NR^Nonreactive   T4 free    Collection Time: 08/27/18  8:20 AM   Result Value Ref Range    T4 Free 0.25 (L) 0.76 - 1.46 ng/dL   Antibody titer red cell    Collection Time: 08/27/18  8:45 AM   Result Value Ref Range    Antibody Titer       ANTI A : IgM 32 , IgG 128  ANTI B : IgM 64 , IgG 64     US Abdomen Complete w Doppler Complete    Collection Time: 08/27/18  9:20 AM   Result Value Ref Range    Radiologist flags New liver lesions. Consider liver MRI.    ABO type [[id number]]    Collection Time: 08/27/18 12:27 PM   Result Value Ref Range    ABO O     RH(D) Pos     Specimen Expires 08/30/2018      I had a long discussion with the patient regarding liver transplantation which included but was not limited to  the following points:    1. Liver transplant selection committee process.  2. The federal rules for cadaveric waiting list, the size and blood type matching of the organ. The availability of living-related donor transplantation.  3. The types of donors: brain death donors, non-heart beating donors, partial liver grafts: splits and living donor grafts  4. Extended criteria  Donors (older age, steasosis) and the increased  risk of primary  non-function using the extended criteria donors  5. The CDC high risk donors,  Risk of donor transmitted infections and donor transmitted malignancy  6. The liver transplant operation and the associated risks and technical complications which can include intraoperative death, post operative death,  Primary non-function, bleeding requiring re-operations, arterial and biliary complications, bowel perforations, and intra abdominal abscess. Some of these complicaitons may require a second operation.  7. The postoperative course, the ICU stay and risk of postoperative complications which can include sepsis, MI, stroke, brain injury, pneumonia, pleural effusions, and renal dysfunction.  8. The current 1 year and 5 year graft and patient survivals.  9. The need for life long immunosuppressive therapy and the side effects of these medications, including the possibility of toxicity, opportunistic infections, risk of cancer including lymphoma, and the possibility of rejection even if the patient is taking the medication exactly as prescribed.  10. The need for compliance with medications and follow-up visits in the clinic and thereafter.  11. The patient and family understand these risks and wish to proceed to transplantation       I spent 45 minutes with the patient and more than 50% of the time was spend in direct face to face counseling.

## 2018-08-28 NOTE — MR AVS SNAPSHOT
After Visit Summary   8/28/2018    Junior Patricia    MRN: [id number]           Patient Information     Date Of Birth          1950        Visit Information        Provider Department      8/28/2018 9:00 AM Tre Oconnor MD [facility] Solid Organ Transplant        Today's Diagnoses     Pre-transplant evaluation for liver transplant    -  1    Chronic hepatitis B (H)        History of kidney transplant         Immunosuppression (H)           Follow-ups after your visit        Your next 10 appointments already scheduled     Aug 30, 2018  8:00 AM CDT   Transplant Class-Liver with  TRANSPLANT CLASS   [facility] Solid Organ Transplant ([facility])    [address]  [address]  [facility] [address] [phone]            Aug 30, 2018  9:15 AM CDT   New Patient Visit with Zenobia Alexis MD   [facility] Heart Care ([facility])    [address]  [address]  [facility] [id number] [phone]            Aug 30, 2018 10:10 AM CDT   Kidney Donor Evaluation with  Kidney Donor Eval   [facility] Nephrology ([facility])    [address]  [address]  [facility] [id number] [phone]            Aug 30, 2018  1:00 PM CDT   FULL PULMONARY FUNCTION with  PFL D   [facility] Pulmonary Function Testing ([facility])    [address]  3rd Floor  [facility] [id number] [phone]              Future tests that were ordered for you today     Open Future Orders        Priority Expected Expires Ordered    ECHO STRESS DOBUTAMINE WITH OPTISON Routine  8/9/2019 8/9/2018            Who to contact     If you have questions or need follow up information about today's clinic visit or your schedule please contact [facility] SOLID ORGAN TRANSPL[facility] directly at [phone].  Normal or non-critical lab and imaging results will be communicated to you by MyChart, letter or phone within 4  "business days after the clinic has received the results. If you do not hear from us within 7 days, please contact the clinic through [employer] or phone. If you have a critical or abnormal lab result, we will notify you by phone as soon as possible.  Submit refill requests through [employer] or call your pharmacy and they will forward the refill request to us. Please allow 3 business days for your refill to be completed.          Additional Information About Your Visit        Check[employer] Information     [employer] lets you send messages to your doctor, view your test results, renew your prescriptions, schedule appointments and more. To sign up, go to [email or web address] . Click on \"Log in\" on the left side of the screen, which will take you to the Welcome page. Then click on \"Sign up Now\" on the right side of the page.     You will be asked to enter the access code listed below, as well as some personal information. Please follow the directions to create your username and password.     Your access code is: [email or web address]  Expires: 2018  6:30 AM     Your access code will  in 90 days. If you need help or a new code, please call your [facility] clinic or [phone].        Care EveryWhere ID     This is your Care EveryWhere ID. This could be used by other organizations to access your [facility] medical records  [id number]         Blood Pressure from Last 3 Encounters:   18 102/65   18 114/70   10/27/17 115/78    Weight from Last 3 Encounters:   18 72.9 kg (160 lb 12.8 oz)   18 70.8 kg (156 lb)   10/25/17 88 kg (194 lb)              Today, you had the following     No orders found for display       Primary Care Provider Office Phone # Fax #    [facility] [phone] [address]  [facility] [address]        Equal Access to Services     KING RAND AH: Razia Abrams, watheoda luqadaha, qaybta domoniquealjaqueline rossi, rigoberto gonzalez " leosegundoflip learyPhyliciaaasu ah. So [facility] [phone].    ATENCIÓN: Si habla kay, tiene a murdock disposición servicios gratuitos de asistencia lingüística. Alcides gallo [phone].    We comply with applicable federal civil rights laws and [place] laws. We do not discriminate on the basis of race, color, national origin, age, disability, sex, sexual orientation, or gender identity.            Thank you!     Thank you for choosing [facility] TRANSPL[facility]  for your care. Our goal is always to provide you with excellent care. Hearing back from our patients is one way we can continue to improve our services. Please take a few minutes to complete the written survey that you may receive in the mail after your visit with us. Thank you!             Your Updated Medication List - Protect others around you: Learn how to safely use, store and throw away your medicines at www.disposemymeds.org.          This list is accurate as of 8/28/18 11:50 AM.  Always use your most recent med list.                   Brand Name Dispense Instructions for use Diagnosis    aspirin 81 MG EC tablet      Take 81 mg by mouth daily        CELLCEPT 500 MG tablet      Take 1,000 mg by mouth 2 times daily        cycloSPORINE modified 25 MG capsule    GENERIC EQUIVALENT     Take 25 mg by mouth every evening        DILTIAZEM  MG 24 hr capsule   Generic drug:  diltiazem      Take 120 mg by mouth daily        furosemide 20 MG tablet    LASIX    30 tablet    Take 1 tablet (20 mg) by mouth daily    Cirrhosis of liver with ascites, unspecified hepatic cirrhosis type (H)       metoprolol tartrate 25 MG tablet    LOPRESSOR     Take 12.5 mg by mouth 2 times daily        predniSONE 5 MG tablet    DELTASONE     Take 5 mg by mouth daily        PROAIR  (90 Base) MCG/ACT inhaler   Generic drug:  albuterol     8.5 g    INHALE 2 PUFFS BY MOUTH EVERY 6 HOURS AS NEEDED FOR SHORTNESS OF BREATH/DYPSNEA/WHEEZING    Exacerbation of asthma, unspecified asthma severity,  unspecified whether persistent       spironolactone 50 MG tablet    ALDACTONE    30 tablet    Take 1 tablet (50 mg) by mouth daily    Cirrhosis of liver with ascites, unspecified hepatic cirrhosis type (H)

## 2018-08-28 NOTE — LETTER
8/28/2018      RE: Junior Patricia  [address] [place] [address]       Hepatology Clinic note  Junior Patricia   Date of Birth 1950  Date of Service 8/28/2018    Reason for consult: Decompensated liver/kidney disease, HBV  Requesting provider: Dr. Augustus Dennis MD       Impression and plan:   Junior Patricia is a 68 year old male with HTN, HLD, obesity, DM, ESRD s/p kidney transplant in 2004, currently with CKD 3, chronic HBV with less than ideal adherence with antiviral therapy in the past, complicated by refractory ascites, SBP x 3, HBV low-level viremia despite entecavir.     MELD-Na: 28, ABO: O  Kidney IS:mycophenolate, prednisone, cyclosporine    Significant decompensation over the past ~ 1 year or so, likely multifactorial, however, the most concerning are the 2 new lesions seen in his liver.   * will discuss his during the multidisciplinary liver tumor conference, suspect we may be able to perform an MRI without contrast to minimize the risk of complications and CKD.  Ideally this would be performed after large-volume paracentesis.  *Generally speaking, he would not be a transplant candidate for combined liver kidney due to his age.  However we will continue current assessment in order to get better understanding of reasons for decompensation liver standpoint, most concerning would be the above lesions, and if he would tolerate kidney transplant alone if needed/advised.    *In the future we may consider transjugular liver biopsy with pressure gradient measurement.    - SBP:   Recurrent SBP, encouraged adherence with SBP prophylaxis.     - Refractory ascites:  Uncontrolled, encouraged low-sodium diet, high-protein intake.  We will continue regular paracentesis, currently every 10 days or so, with albumin regardless of volume removed.    - HBV:  Continue entecavir at current dose.     - Worsening renal function:  [place] [facility]    [place] in 2-3 months or sooner as needed    Emerita Quintero  MD  [facility] Transplant Hepatology clinic  -----------------------------------------------------       HPI:   Junior Patricia is a 68 year old male with history of chronic hepatitis B complicated by decompensated cirrhosis in the setting of CKD, who presents for evaluation of liver transplantation.    Briefly he was originally diagnosed with hepatitis B in 2000 when he was initiated on hemodialysis.  In 2004 he underwent living donor kidney transplantation from his daughter and tolerated the procedure well.  Has had intermittent follow-up with [facility] over the years, it seems that in the early 2000 she was started on tenofovir which was maintained after his kidney transplant.  However documents from Dr. Dennis suggest that when the patient was evaluated later in 2010 he was not on antiviral medications.  It seems cost was an issue and he readily admitted to not taking it all the time.    More recently in late 2017 he started developing worsening ascites, and fatigue.  He was hospitalized in 10/2017 at [facility] with elevated white blood cell count, imaging suggested cirrhotic appearing liver, and he had evidence of SBP.  At that time he was started on TAF for hep B, but later discontinued in 2/2018 due to worsening renal function.  In 4/2018 he was started on renally adjusted dose of entecavir.    It appears that his had 3 episodes of SBP requiring hospitalization including 10/2017, 2/2018, 5/2018.  The last one was with gram-negative bacteria and did not appear the patient was taking prophylactic antibiotics, Bactrim.    Per the patient he is having a number of symptoms at this time including low energy, dyspnea on exertion, but most bothersome abdominal distention with reduced appetite, early satiety, difficulty sleeping and turning leading to shortness of breath, muscle mass loss, and pruritus.  He notes full adherence with all medications, denies any alcohol use.            "Past Medical History:     Past Medical History:   Diagnosis Date     Ascites      CKD (chronic kidney disease) stage 3, GFR 30-59 ml/min 4/28/2011     Colon polyps 5/24/2011     DM type 2, goal A1C below 8.0 8/4/2014     Elevated LFT's 5/11/2011     GOUT NOS 9/20/2007     Hyperlipidemia LDL goal <100 10/31/2010     Kidney replaced by transplant     done at [facility]     Macrocytosis without anemia 2/26/2016     Nephrolithiasis      Obesity, unspecified      SBP (spontaneous bacterial peritonitis) (H) 2/2018,5/2018,8/2018            Past Surgical History:     Past Surgical History:   Procedure Laterality Date     KNEE SURGERY       TRANSPLANT KIDNEY RECIPIENT LIVING RELATED  2004    uric acid nephropathy            Medications:     Current Outpatient Prescriptions   Medication     aspirin 81 MG EC tablet     cycloSPORINE modified (GENERIC EQUIVALENT) 25 MG capsule     diltiazem (DILTIAZEM CD) 120 MG 24 hr capsule     furosemide (LASIX) 20 MG tablet     metoprolol (LOPRESSOR) 25 MG tablet     mycophenolate (CELLCEPT) 500 MG tablet     predniSONE (DELTASONE) 5 MG tablet     PROAIR  (90 BASE) MCG/ACT inhaler     spironolactone (ALDACTONE) 50 MG tablet     No current facility-administered medications for this visit.             Allergies:     Allergies   Allergen Reactions     Lisinopril Swelling     No Known Drug Allergies      Simvastatin Itching            Social History:      reports that he has never smoked. He has never used smokeless tobacco. He reports that he does not drink alcohol or use illicit drugs.   reports that he currently engages in sexual activity and has had female partners.           Family History:   Negative for chronic liver disease.          Review of Systems:   10 points ROS was obtained and highlighted in the HPI, otherwise negative.          Physical Exam:   VS:  /65  Pulse 74  Temp 97.7  F (36.5  C)  Ht 1.549 m (5' 1\")  Wt 72.9 kg (160 lb 12.8 oz)  SpO2 94%  BMI 30.38 " kg/m2      Gen: A&Ox3, NAD, chronically ill-appearing  HEENT: non-icteric, oropharynx clear  CV: RRR  Lung: Bibasilar crackles  Abd: soft, NT, distended positive fluid wave, splenomegaly.  Ext: 1+ edema, intact pulses.   Skin: stigmata of chronic liver disease.   Neuro: no focal deficits, grossly intact, no asterixis   Psych: appropriate mood and affects       Data:   Reviewed in person and significant for:  Lab Results   Component Value Date    WBC 8.4 08/27/2018     Lab Results   Component Value Date    RBC 2.83 08/27/2018     Lab Results   Component Value Date    HGB 9.8 08/27/2018     Lab Results   Component Value Date    HCT 28.7 08/27/2018     No components found for: MCT  Lab Results   Component Value Date     08/27/2018     Lab Results   Component Value Date    MCH 34.6 08/27/2018     Lab Results   Component Value Date    MCHC 34.1 08/27/2018     Lab Results   Component Value Date    RDW 14.1 08/27/2018     Lab Results   Component Value Date    PLT 49 08/27/2018     Last Comprehensive Metabolic Panel:  Sodium   Date Value Ref Range Status   08/27/2018 127 (L) 133 - 144 mmol/L Final     Potassium   Date Value Ref Range Status   08/27/2018 3.7 3.4 - 5.3 mmol/L Final     Chloride   Date Value Ref Range Status   08/27/2018 86 (L) 94 - 109 mmol/L Final     Carbon Dioxide   Date Value Ref Range Status   08/27/2018 27 20 - 32 mmol/L Final     Anion Gap   Date Value Ref Range Status   08/27/2018 14 3 - 14 mmol/L Final     Glucose   Date Value Ref Range Status   08/27/2018 288 (H) 70 - 99 mg/dL Final     Urea Nitrogen   Date Value Ref Range Status   08/27/2018 105 (H) 7 - 30 mg/dL Final     Creatinine   Date Value Ref Range Status   08/27/2018 4.87 (H) 0.66 - 1.25 mg/dL Final     GFR Estimate   Date Value Ref Range Status   08/27/2018 12 (L) >60 mL/min/1.7m2 Final     Calcium   Date Value Ref Range Status   08/27/2018 8.7 8.5 - 10.1 mg/dL Final     Liver Function Studies -   Recent Labs   Lab Test  08/27/18    0820   PROTTOTAL  5.9*   ALBUMIN  2.3*   BILITOTAL  0.7   ALKPHOS  253*   AST  70*   ALT  51       Emerita Quintero MD

## 2018-08-28 NOTE — MR AVS SNAPSHOT
After Visit Summary   8/28/2018    Junior Patricia    MRN: [id number]           Patient Information     Date Of Birth          1950        Visit Information        Provider Department      8/28/2018 9:15 AM Perri Bryant RD; [place] [employer] [facility] Solid Organ Transplant        Today's Diagnoses     Cirrhosis of liver (H)     -  1       Follow-ups after your visit        Your next 10 appointments already scheduled     Aug 30, 2018  8:00 AM CDT   Transplant Class-Liver with  TRANSPLANT CLASS   [facility] Solid Organ Transplant ([facility])    [address]  [address]  [facility] [id number] [phone]            Aug 30, 2018  9:15 AM CDT   New Patient Visit with Zenobia Alexis MD   [facility] Heart Care ([facility])    [address]  [address]  [facility] [id number] [phone]            Aug 30, 2018 10:10 AM CDT   Kidney Donor Evaluation with  Kidney Donor Tamera   [facility] Nephrology ([facility])    [address]  [address]  [facility] [id number] [phone]            Aug 30, 2018  1:00 PM CDT   FULL PULMONARY FUNCTION with  PFL D   [facility] Pulmonary Function Testing ([facility])    [address]  3rd Floor  [facility] [id number] [phone]              Future tests that were ordered for you today     Open Future Orders        Priority Expected Expires Ordered    ECHO STRESS DOBUTAMINE WITH OPTISON Routine  8/9/2019 8/9/2018            Who to contact     If you have questions or need follow up information about today's clinic visit or your schedule please contact [facility] ORGAN TRANSPL[facility] directly at [phone].  Normal or non-critical lab and imaging results will be communicated to you by MyChart, letter or phone within 4 business days after the clinic has received the results. If you do not hear from us within 7  "days, please contact the clinic through [employer] or phone. If you have a critical or abnormal lab result, we will notify you by phone as soon as possible.  Submit refill requests through [employer] or call your pharmacy and they will forward the refill request to us. Please allow 3 business days for your refill to be completed.          Additional Information About Your Visit        [employer] Information     [employer] lets you send messages to your doctor, view your test results, renew your prescriptions, schedule appointments and more. To sign up, go to [email or web address] . Click on \"Log in\" on the left side of the screen, which will take you to the Welcome page. Then click on \"Sign up Now\" on the right side of the page.     You will be asked to enter the access code listed below, as well as some personal information. Please follow the directions to create your username and password.     Your access code is: [email or web address]  Expires: 2018  6:30 AM     Your access code will  in 90 days. If you need help or a new code, please call your [facility] clinic or [phone].        Care EveryWhere ID     This is your Care EveryWhere ID. This could be used by other organizations to access your [facility] medical records  [id number]         Blood Pressure from Last 3 Encounters:   18 102/65   18 114/70   10/27/17 115/78    Weight from Last 3 Encounters:   18 72.9 kg (160 lb 12.8 oz)   18 70.8 kg (156 lb)   10/25/17 88 kg (194 lb)              Today, you had the following     No orders found for display       Primary Care Provider Office Phone # Fax #    [facility] [phone] [address] [facility] [address]        Equal Access to Services     KING RAND : Razia Abrams, waclaudette castro, qaybta kaalmada flo, rigoberto swartz. So [facility] [phone].    ATENCIÓN: Si yanira vickers a murdock disposición " servicios gratuitos de asistencia lingüística. Alcides gallo [phone].    We comply with applicable federal civil rights laws and [place] laws. We do not discriminate on the basis of race, color, national origin, age, disability, sex, sexual orientation, or gender identity.            Thank you!     Thank you for choosing [facility] SOLID ORGAN TRANSPLANT  for your care. Our goal is always to provide you with excellent care. Hearing back from our patients is one way we can continue to improve our services. Please take a few minutes to complete the written survey that you may receive in the mail after your visit with us. Thank you!             Your Updated Medication List - Protect others around you: Learn how to safely use, store and throw away your medicines at www.disposemymeds.org.          This list is accurate as of 8/28/18  1:08 PM.  Always use your most recent med list.                   Brand Name Dispense Instructions for use Diagnosis    aspirin 81 MG EC tablet      Take 81 mg by mouth daily        CELLCEPT 500 MG tablet      Take 1,000 mg by mouth 2 times daily        cycloSPORINE modified 25 MG capsule    GENERIC EQUIVALENT     Take 25 mg by mouth every evening        DILTIAZEM  MG 24 hr capsule   Generic drug:  diltiazem      Take 120 mg by mouth daily        furosemide 20 MG tablet    LASIX    30 tablet    Take 1 tablet (20 mg) by mouth daily    Cirrhosis of liver with ascites, unspecified hepatic cirrhosis type (H)       metoprolol tartrate 25 MG tablet    LOPRESSOR     Take 12.5 mg by mouth 2 times daily        predniSONE 5 MG tablet    DELTASONE     Take 5 mg by mouth daily        PROAIR  (90 Base) MCG/ACT inhaler   Generic drug:  albuterol     8.5 g    INHALE 2 PUFFS BY MOUTH EVERY 6 HOURS AS NEEDED FOR SHORTNESS OF BREATH/DYPSNEA/WHEEZING    Exacerbation of asthma, unspecified asthma severity, unspecified whether persistent       spironolactone 50 MG tablet    ALDACTONE    30 tablet     Take 1 tablet (50 mg) by mouth daily    Cirrhosis of liver with ascites, unspecified hepatic cirrhosis type (H)

## 2018-08-28 NOTE — COMMITTEE REVIEW
Abdominal Patient Discussion Note Transplant Coordinator: Lindsey Mosley  Transplant Surgeon:   Tre Oconnor    Referring Physician: Augustus Dennis    Committee Review Members:  Nutrition Terri Bryant, RD   Pharmacist Juanjose Shin, [facility]    - Clinical Felicity Badillo, MSMAIRA, Kathe Finch, [facility]   Transplant Emerita Quintero MD, Jey Melendez MD, Jr Wily Summers, DIMITRY, Jennifer Roland MD, Genny Avery, APRN CNP, Lindsey Mosley, RN, Tiarra George, RN, Angeline Buckley MD, Nikolay Hawkins MD, Onesimo Early MD, Tre Oconnor MD, Rubens Mazariegos MD       Additional Discussion Notes and Findings:   Re discuss  TC-liver lesions  Cancel PKE- follow at [facility] for kidney  Possible kidney only

## 2018-08-28 NOTE — MR AVS SNAPSHOT
"              After Visit Summary   2018    Junior Patricia    MRN: [id number]           Patient Information     Date Of Birth          1950        Visit Information        Provider Department      2018 10:00 AM Kathe Finch LICSW [facility] Solid Organ Transplant        Today's Diagnoses     Organ transplant candidate    -  1       Follow-ups after your visit        Who to contact     If you have questions or need follow up information about today's clinic visit or your schedule please contact [facility] SOLID ORGAN TRANSPLANT directly at [phone].  Normal or non-critical lab and imaging results will be communicated to you by No Paper Just Vaporhart, letter or phone within 4 business days after the clinic has received the results. If you do not hear from us within 7 days, please contact the clinic through test companyt or phone. If you have a critical or abnormal lab result, we will notify you by phone as soon as possible.  Submit refill requests through PeerSpace or call your pharmacy and they will forward the refill request to us. Please allow 3 business days for your refill to be completed.          Additional Information About Your Visit        MyChart Information     PeerSpace lets you send messages to your doctor, view your test results, renew your prescriptions, schedule appointments and more. To sign up, go to [email or web address] . Click on \"Log in\" on the left side of the screen, which will take you to the Welcome page. Then click on \"Sign up Now\" on the right side of the page.     You will be asked to enter the access code listed below, as well as some personal information. Please follow the directions to create your username and password.     Your access code is: [email or web address]  Expires: 2018  6:30 AM     Your access code will  in 90 days. If you need help or a new code, please call your [place] clinic or [phone].        Care EveryWhere ID     This is your Care EveryWhere ID. This could be " used by other organizations to access your [place] medical records  [id number]         Blood Pressure from Last 3 Encounters:   08/30/18 101/61   08/28/18 102/65   04/08/18 114/70    Weight from Last 3 Encounters:   08/30/18 69.9 kg (154 lb)   08/28/18 72.9 kg (160 lb 12.8 oz)   04/08/18 70.8 kg (156 lb)              Today, you had the following     No orders found for display       Primary Care Provider Office Phone # Fax #    [facility] [phone] [address]  [facility] [address]        Equal Access to Services     KING RAND : Hadii sherry Abrams, watheoda gloria, lonnie kaalmada flo, rigoberto swartz. So [facility] [phone].    ATENCIÓN: Si habla español, tiene a murdock disposición servicios gratuitos de asistencia lingüística. Llame al [phone].    We comply with applicable federal civil rights laws and [place] laws. We do not discriminate on the basis of race, color, national origin, age, disability, sex, sexual orientation, or gender identity.            Thank you!     Thank you for choosing [facility] SOLID ORGAN TRANSPLANT  for your care. Our goal is always to provide you with excellent care. Hearing back from our patients is one way we can continue to improve our services. Please take a few minutes to complete the written survey that you may receive in the mail after your visit with us. Thank you!             Your Updated Medication List - Protect others around you: Learn how to safely use, store and throw away your medicines at www.disposemymeds.org.          This list is accurate as of 8/28/18 11:59 PM.  Always use your most recent med list.                   Brand Name Dispense Instructions for use Diagnosis    aspirin 81 MG EC tablet      Take 81 mg by mouth daily        CELLCEPT 500 MG tablet      Take 1,000 mg by mouth 2 times daily        cycloSPORINE modified 25 MG capsule    GENERIC EQUIVALENT     Take 25 mg  by mouth every evening        DILTIAZEM  MG 24 hr capsule   Generic drug:  diltiazem      Take 120 mg by mouth daily        furosemide 20 MG tablet    LASIX    30 tablet    Take 1 tablet (20 mg) by mouth daily    Cirrhosis of liver with ascites, unspecified hepatic cirrhosis type (H)       metoprolol tartrate 25 MG tablet    LOPRESSOR     Take 12.5 mg by mouth 2 times daily        predniSONE 5 MG tablet    DELTASONE     Take 5 mg by mouth daily        PROAIR  (90 Base) MCG/ACT inhaler   Generic drug:  albuterol     8.5 g    INHALE 2 PUFFS BY MOUTH EVERY 6 HOURS AS NEEDED FOR SHORTNESS OF BREATH/DYPSNEA/WHEEZING    Exacerbation of asthma, unspecified asthma severity, unspecified whether persistent       spironolactone 50 MG tablet    ALDACTONE    30 tablet    Take 1 tablet (50 mg) by mouth daily    Cirrhosis of liver with ascites, unspecified hepatic cirrhosis type (H)

## 2018-08-28 NOTE — PROGRESS NOTES
Hepatology Clinic note  Junior Patricia   Date of Birth 1950  Date of Service 8/28/2018    Reason for consult: Decompensated liver/kidney disease, HBV  Requesting provider: Dr. Augustus Dennis MD       Impression and plan:   Junior Patricia is a 68 year old male with HTN, HLD, obesity, DM, ESRD s/p kidney transplant in 2004, currently with CKD 3, chronic HBV with less than ideal adherence with antiviral therapy in the past, complicated by refractory ascites, SBP x 3, HBV low-level viremia despite entecavir.     MELD-Na: 28, ABO: O  Kidney IS:mycophenolate, prednisone, cyclosporine    Significant decompensation over the past ~ 1 year or so, likely multifactorial, however, the most concerning are the 2 new lesions seen in his liver.   * will discuss his during the multidisciplinary liver tumor conference, suspect we may be able to perform an MRI without contrast to minimize the risk of complications and CKD.  Ideally this would be performed after large-volume paracentesis.  *Generally speaking, he would not be a transplant candidate for combined liver kidney due to his age.  However we will continue current assessment in order to get better understanding of reasons for decompensation liver standpoint, most concerning would be the above lesions, and if he would tolerate kidney transplant alone if needed/advised.    *In the future we may consider transjugular liver biopsy with pressure gradient measurement.    - SBP:   Recurrent SBP, encouraged adherence with SBP prophylaxis.     - Refractory ascites:  Uncontrolled, encouraged low-sodium diet, high-protein intake.  We will continue regular paracentesis, currently every 10 days or so, with albumin regardless of volume removed.    - HBV:  Continue entecavir at current dose.     - Worsening renal function:  Follows with [facility]    RTC in 2-3 months or sooner as needed    Emerita Quintero MD  [facility] Transplant Hepatology  clinic  -----------------------------------------------------       HPI:   Junior Patricia is a 68 year old male with history of chronic hepatitis B complicated by decompensated cirrhosis in the setting of CKD, who presents for evaluation of liver transplantation.    Briefly he was originally diagnosed with hepatitis B in 2000 when he was initiated on hemodialysis.  In 2004 he underwent living donor kidney transplantation from his daughter and tolerated the procedure well.  Has had intermittent follow-up with [facility] over the years, it seems that in the early 2000 she was started on tenofovir which was maintained after his kidney transplant.  However documents from Dr. Dennis suggest that when the patient was evaluated later in 2010 he was not on antiviral medications.  It seems cost was an issue and he readily admitted to not taking it all the time.    More recently in late 2017 he started developing worsening ascites, and fatigue.  He was hospitalized in 10/2017 at [facility] with elevated white blood cell count, imaging suggested cirrhotic appearing liver, and he had evidence of SBP.  At that time he was started on TAF for hep B, but later discontinued in 2/2018 due to worsening renal function.  In 4/2018 he was started on renally adjusted dose of entecavir.    It appears that his had 3 episodes of SBP requiring hospitalization including 10/2017, 2/2018, 5/2018.  The last one was with gram-negative bacteria and did not appear the patient was taking prophylactic antibiotics, Bactrim.    Per the patient he is having a number of symptoms at this time including low energy, dyspnea on exertion, but most bothersome abdominal distention with reduced appetite, early satiety, difficulty sleeping and turning leading to shortness of breath, muscle mass loss, and pruritus.  He notes full adherence with all medications, denies any alcohol use.           Past Medical History:     Past Medical History:  "  Diagnosis Date     Ascites      CKD (chronic kidney disease) stage 3, GFR 30-59 ml/min 4/28/2011     Colon polyps 5/24/2011     DM type 2, goal A1C below 8.0 8/4/2014     Elevated LFT's 5/11/2011     GOUT NOS 9/20/2007     Hyperlipidemia LDL goal <100 10/31/2010     Kidney replaced by transplant     done at [facility]     Macrocytosis without anemia 2/26/2016     Nephrolithiasis      Obesity, unspecified      SBP (spontaneous bacterial peritonitis) (H) 2/2018,5/2018,8/2018            Past Surgical History:     Past Surgical History:   Procedure Laterality Date     KNEE SURGERY       TRANSPLANT KIDNEY RECIPIENT LIVING RELATED  2004    uric acid nephropathy            Medications:     Current Outpatient Prescriptions   Medication     aspirin 81 MG EC tablet     cycloSPORINE modified (GENERIC EQUIVALENT) 25 MG capsule     diltiazem (DILTIAZEM CD) 120 MG 24 hr capsule     furosemide (LASIX) 20 MG tablet     metoprolol (LOPRESSOR) 25 MG tablet     mycophenolate (CELLCEPT) 500 MG tablet     predniSONE (DELTASONE) 5 MG tablet     PROAIR  (90 BASE) MCG/ACT inhaler     spironolactone (ALDACTONE) 50 MG tablet     No current facility-administered medications for this visit.             Allergies:     Allergies   Allergen Reactions     Lisinopril Swelling     No Known Drug Allergies      Simvastatin Itching            Social History:      reports that he has never smoked. He has never used smokeless tobacco. He reports that he does not drink alcohol or use illicit drugs.   reports that he currently engages in sexual activity and has had female partners.           Family History:   Negative for chronic liver disease.          Review of Systems:   10 points ROS was obtained and highlighted in the HPI, otherwise negative.          Physical Exam:   VS:  /65  Pulse 74  Temp 97.7  F (36.5  C)  Ht 1.549 m (5' 1\")  Wt 72.9 kg (160 lb 12.8 oz)  SpO2 94%  BMI 30.38 kg/m2      Gen: A&Ox3, NAD, chronically " ill-appearing  HEENT: non-icteric, oropharynx clear  CV: RRR  Lung: Bibasilar crackles  Abd: soft, NT, distended positive fluid wave, splenomegaly.  Ext: 1+ edema, intact pulses.   Skin: stigmata of chronic liver disease.   Neuro: no focal deficits, grossly intact, no asterixis   Psych: appropriate mood and affects       Data:   Reviewed in person and significant for:  Lab Results   Component Value Date    WBC 8.4 08/27/2018     Lab Results   Component Value Date    RBC 2.83 08/27/2018     Lab Results   Component Value Date    HGB 9.8 08/27/2018     Lab Results   Component Value Date    HCT 28.7 08/27/2018     No components found for: MCT  Lab Results   Component Value Date     08/27/2018     Lab Results   Component Value Date    MCH 34.6 08/27/2018     Lab Results   Component Value Date    MCHC 34.1 08/27/2018     Lab Results   Component Value Date    RDW 14.1 08/27/2018     Lab Results   Component Value Date    PLT 49 08/27/2018     Last Comprehensive Metabolic Panel:  Sodium   Date Value Ref Range Status   08/27/2018 127 (L) 133 - 144 mmol/L Final     Potassium   Date Value Ref Range Status   08/27/2018 3.7 3.4 - 5.3 mmol/L Final     Chloride   Date Value Ref Range Status   08/27/2018 86 (L) 94 - 109 mmol/L Final     Carbon Dioxide   Date Value Ref Range Status   08/27/2018 27 20 - 32 mmol/L Final     Anion Gap   Date Value Ref Range Status   08/27/2018 14 3 - 14 mmol/L Final     Glucose   Date Value Ref Range Status   08/27/2018 288 (H) 70 - 99 mg/dL Final     Urea Nitrogen   Date Value Ref Range Status   08/27/2018 105 (H) 7 - 30 mg/dL Final     Creatinine   Date Value Ref Range Status   08/27/2018 4.87 (H) 0.66 - 1.25 mg/dL Final     GFR Estimate   Date Value Ref Range Status   08/27/2018 12 (L) >60 mL/min/1.7m2 Final     Calcium   Date Value Ref Range Status   08/27/2018 8.7 8.5 - 10.1 mg/dL Final     Liver Function Studies -   Recent Labs   Lab Test  08/27/18   0820   PROTTOTAL  5.9*   ALBUMIN  2.3*    BILITOTAL  0.7   ALKPHOS  253*   AST  70*   ALT  51

## 2018-08-29 NOTE — TELEPHONE ENCOUNTER
Left message with post kidney coordinator updating on patient neptali; function and being evaluated here for liver transplant. Contact information provided.

## 2018-08-29 NOTE — TELEPHONE ENCOUNTER
Spoke with Sakina, with aid of [facility] , regarding schedule change for tomorrow. Per teach back has clear understanding and agrees to make appointment with kidney at [facility]..

## 2018-08-29 NOTE — TELEPHONE ENCOUNTER
FUTURE VISIT INFORMATION:    Date: 8/30/18    Time: 0930    Location:  cardiology  REFERRAL INFORMATION:    Referring provider:      Referring providers clinic:   [facility] Other Services    Reason for visit/diagnosis :  Pre Liver Eval            NOTES STATUS DETAILS   OFFICE NOTE from referring provider  Internal    OFFICE NOTE from other cardiologist  N/A    DISCHARGE SUMMARY from hospital  N/A    DISCHARGE REPORT from the ER N/A    OPERATIVE REPORT  N/A    MEDICATION LIST Internal    LABS     BMP Internal    CBC     internal    CMP N/A    TSH     internal    Lipids Internal    DIAGNOSTIC PROCEDURES     EKG Internal    Monitor Reports N/A     N/A    IMAGING (DISC & REPORT)      ECHO  Internal    Stress Tests N/A    Cath N/A    MRI/MRA N/A    CT/CTA N/A     N/A

## 2018-08-30 NOTE — MR AVS SNAPSHOT
After Visit Summary   8/30/2018    Junior Patricia    MRN: [id number]           Patient Information     Date Of Birth          1950        Visit Information        Provider Department      8/30/2018 10:00 AM LANGUAGE BANC;  [facility]        Today's Diagnoses     Hepatitis B    -  1       Follow-ups after your visit        Your next 10 appointments already scheduled     Sep 07, 2018 10:00 AM CDT   NM INJECTION with UUNMINJ1   [facility], Nuclear Medicine ([facility])    [address] [address] [phone]            Sep 07, 2018 10:45 AM CDT   NM SCAN3 with UUNM1   [facility], Nuclear Medicine ([facility])    [address] [address] [phone]            Sep 07, 2018 11:15 AM CDT   Ekg Stress Nm Lexiscan with UUEKGNMS   [facility] ([facility])    [address] [address]               Sep 07, 2018 12:15 PM CDT   NM MPI WITH LEXISCAN with UUNM1   [facility], Nuclear Medicine ([facility])    [address] [address] [phone]           For a ONE day exam: Allow 3-4 hours for test. For a TWO day exam: Allow  minutes PER day for test.  On the day of your resting scan: Please stop eating 3 hours before the test. You may drink water or juice.  On the day of your stress test: Stop all caffeine 12 hours before the test. This includes coffee, tea, soda pop, chocolate and certain medicines (such as Anacin, Excedrin and NoDoz). Also avoid decaf coffee and tea, as these contain small amounts of caffeine.  Stop eating 3 hours before the test. You may drink water.  You may need to stop some medicines before the test. Follow your doctor s orders. - If you take a beta blocker: Do  not take your beta-blocker on the day before your test, unless specifically told to by your doctor. And do not take it on the day of your test. Bring it with you to take after the test. - If you take Aggrenox or dipyridamole (Persantine, Permole), stop taking it 48 hours before your test. - If you take Viagra, Cialis or Levitra, stop taking it 48 hours before your test. - If you take theophylline or aminophylline, stop taking it 12 hours before your test.  Do not take nitrates on the day of your test. Do not wear your Nitro-Patch.  Please wear a loose two-piece outfit. If you will have an exercise test, bring rubber-soled walking shoes.  When you arrive, please tell us if you have a pacemaker or ICD (implantable defibrillator).  Please call your Imaging Department at your exam site with any questions.              Future tests that were ordered for you today     Open Future Orders        Priority Expected Expires Ordered    NM Lexiscan stress test (nuc card) Routine 8/30/2018 11/28/2018 8/30/2018            Who to contact     If you have questions or need follow up information about today's clinic visit or your schedule please contact [facility] SOLID ORGAN TRANSPLANT directly at [phone].  Normal or non-critical lab and imaging results will be communicated to you by Sendbloomhart, letter or phone within 4 business days after the clinic has received the results. If you do not hear from us within 7 days, please contact the clinic through MedVentivet or phone. If you have a critical or abnormal lab result, we will notify you by phone as soon as possible.  Submit refill requests through [email or web address] or call your pharmacy and they will forward the refill request to us. Please allow 3 business days for your refill to be completed.          Additional Information About Your Visit        [email or web address] Information     [email or web address] lets you send messages to your doctor, view your test results, renew your prescriptions, schedule appointments and  "more. To sign up, go to [email or web address] . Click on \"Log in\" on the left side of the screen, which will take you to the Welcome page. Then click on \"Sign up Now\" on the right side of the page.     You will be asked to enter the access code listed below, as well as some personal information. Please follow the directions to create your username and password.     Your access code is: [email or web address]  Expires: 2018  6:30 AM     Your access code will  in 90 days. If you need help or a new code, please call your [place] clinic or [phone].        Care EveryWhere ID     This is your Care EveryWhere ID. This could be used by other organizations to access your [place] medical records  [id number]         Blood Pressure from Last 3 Encounters:   18 101/61   18 102/65   18 114/70    Weight from Last 3 Encounters:   18 69.9 kg (154 lb)   18 72.9 kg (160 lb 12.8 oz)   18 70.8 kg (156 lb)              Today, you had the following     No orders found for display       Primary Care Provider Office Phone # Fax #    [facility] [phone] [address] [address]        Equal Access to Services     KING RAND : Hadii sherry ortezo Sotri, waaxda luqadaha, qaybta kaalmada adesary, rigoberto sears . So [facility] [phone].    ATENCIÓN: Si habla español, tiene a murdock disposición servicios gratuitos de asistencia lingüística. Llame al [phone].    We comply with applicable federal civil rights laws and [place] laws. We do not discriminate on the basis of race, color, national origin, age, disability, sex, sexual orientation, or gender identity.            Thank you!     Thank you for choosing [facility] SOLID ORGAN TRANSPLANT  for your care. Our goal is always to provide you with excellent care. Hearing back from our patients is one way we can continue to improve our services. Please take a few " minutes to complete the written survey that you may receive in the mail after your visit with us. Thank you!             Your Updated Medication List - Protect others around you: Learn how to safely use, store and throw away your medicines at www.disposemymeds.org.          This list is accurate as of 8/30/18  1:46 PM.  Always use your most recent med list.                   Brand Name Dispense Instructions for use Diagnosis    aspirin 81 MG EC tablet      Take 81 mg by mouth daily        CELLCEPT 500 MG tablet      Take 1,000 mg by mouth 2 times daily        cycloSPORINE modified 25 MG capsule    GENERIC EQUIVALENT     Take 25 mg by mouth every evening        DILTIAZEM  MG 24 hr capsule   Generic drug:  diltiazem      Take 120 mg by mouth daily        furosemide 20 MG tablet    LASIX    30 tablet    Take 1 tablet (20 mg) by mouth daily    Cirrhosis of liver with ascites, unspecified hepatic cirrhosis type (H)       metoprolol tartrate 25 MG tablet    LOPRESSOR     Take 12.5 mg by mouth 2 times daily        predniSONE 5 MG tablet    DELTASONE     Take 5 mg by mouth daily        PROAIR  (90 Base) MCG/ACT inhaler   Generic drug:  albuterol     8.5 g    INHALE 2 PUFFS BY MOUTH EVERY 6 HOURS AS NEEDED FOR SHORTNESS OF BREATH/DYPSNEA/WHEEZING    Exacerbation of asthma, unspecified asthma severity, unspecified whether persistent       spironolactone 50 MG tablet    ALDACTONE    30 tablet    Take 1 tablet (50 mg) by mouth daily    Cirrhosis of liver with ascites, unspecified hepatic cirrhosis type (H)

## 2018-08-30 NOTE — MR AVS SNAPSHOT
"              After Visit Summary   2018    Junior Patricia    MRN: [id number]           Patient Information     Date Of Birth          1950        Visit Information        Provider Department      2018 9:15 AM Zenobia Alexis MD; LANGUAGE [facility]        Today's Diagnoses     Pre-operative cardiovascular examination    -  1      Care Instructions    You were seen today in the Cardiovascular Clinic at the [facility].     Cardiology Providers you saw during your visit: Dr. Zenobia Alexis     Diagnosis:   Encounter Diagnosis   Name Primary?     Pre-operative cardiovascular examination Yes        Results: Discussed with you today       Orders:   Orders Placed This Encounter   Procedures     NM Lexiscan stress test (nuc card)       Medication Changes:   None    Medications Discontinued:  There are no discontinued medications.      Recommendations:   1. Lexiscan Stress test       Follow up with Provider - As needed         Please feel free to call me with any questions or concerns.       Chela Stubbs LPN     Questions: [phone]  First press #1 for [employer] for \"Medical Questions\" to reach us Cardiology Nurses.   [phone]   First press #1 for the [employer] and then press #1     On Call Cardiologist for after hours or on weekends: [phone]   option #4 and ask to speak to the on-call Cardiologist.          If you need a medication refill please contact your pharmacy.  Please allow 3 business days for your refill to be completed.  --------------------------------------------------------------  Prep for xiao-nuc stress test: Report to the  [facility] Waiting Room at the [facility]. The hospital is located at [address] [place] [address] on the East bank of the [place].    This test can take up to 3 hours.    Bring inhaler to the stress test to help treat your asthma if needed.     Please let nurse know if you need more than a 2 person assist or a liset lift during the " exam.     NPO 3 hours prior, Water is ok and encouraged    NO alcohol, smoking, caffeine, or decaf products 12 hours prior    TAKE ALL medications as prescribed, hold the following medications if they pertain to you:   If you take Aggrenox or dipyridamole (Persantine, Permole), stop taking it 48 hours before your test.   If you take Viagra, Cialis or Levitra, stop taking it 48 hours before your test.   If you take theophylline or aminophylline, stop taking it 12 hours before your test.   For patients with diabetes:If you take insulin, call your diabetes care team. Ask if you should take a 1/2 dose the morning of your test.   If you take diabetes medicine by mouth, don t take it on the morning of your test. Bring it with you to take after the test. (If you have questions, call your diabetes care team.)      Do not take nitrates on the day of your test. Do not wear your Nitro-Patch    If you have further questions, please utilize [employer] to contact us.   If your question concerns the above instructions, contact:  Chela Stubbs LPN  Nurse Care Coordinator- Heart Care              Follow-ups after your visit        Your next 10 appointments already scheduled     Aug 30, 2018  1:00 PM CDT   FULL PULMONARY FUNCTION with [facility]   [facility] Pulmonary Function Testing ([facility])    [address] [address]   [phone]            Sep 07, 2018 10:00 AM CDT   NM INJECTION with UUNMINJ1   [facility], Nuclear Medicine ([facility])    [address] [facility] [address] [phone]            Sep 07, 2018 10:45 AM CDT   NM SCAN3 with UUNM1   [facility], Nuclear Medicine ([facility])    [address] [address] [phone]            Sep 07, 2018 11:15 AM CDT   Ekg Stress Nm Lexiscan with JORGE L    ELECTROCARDIOLOGY  ([facility])    [address] [facility] [address]               Sep 07, 2018 12:15 PM CDT   NM MPI WITH LEXISCAN with UUNM1   [facility] [place], Nuclear Medicine ([facility])    [address] [place] [address]   [phone]           For a ONE day exam: Allow 3-4 hours for test. For a TWO day exam: Allow  minutes PER day for test.  On the day of your resting scan: Please stop eating 3 hours before the test. You may drink water or juice.  On the day of your stress test: Stop all caffeine 12 hours before the test. This includes coffee, tea, soda pop, chocolate and certain medicines (such as Anacin, Excedrin and NoDoz). Also avoid decaf coffee and tea, as these contain small amounts of caffeine.  Stop eating 3 hours before the test. You may drink water.  You may need to stop some medicines before the test. Follow your doctor s orders. - If you take a beta blocker: Do not take your beta-blocker on the day before your test, unless specifically told to by your doctor. And do not take it on the day of your test. Bring it with you to take after the test. - If you take Aggrenox or dipyridamole (Persantine, Permole), stop taking it 48 hours before your test. - If you take Viagra, Cialis or Levitra, stop taking it 48 hours before your test. - If you take theophylline or aminophylline, stop taking it 12 hours before your test.  Do not take nitrates on the day of your test. Do not wear your Nitro-Patch.  Please wear a loose two-piece outfit. If you will have an exercise test, bring rubber-soled walking shoes.  When you arrive, please tell us if you have a pacemaker or ICD (implantable defibrillator).  Please call your Imaging Department at your exam site with any questions.              Future tests that were ordered for you today     Open Future Orders        Priority Expected Expires Ordered    NM Lexiscan stress  "test (nuc card) Routine 2018            Who to contact     If you have questions or need follow up information about today's clinic visit or your schedule please contact [facility] directly at [phone].  Normal or non-critical lab and imaging results will be communicated to you by MyChart, letter or phone within 4 business days after the clinic has received the results. If you do not hear from us within 7 days, please contact the clinic through [employer] or phone. If you have a critical or abnormal lab result, we will notify you by phone as soon as possible.  Submit refill requests through [employer] or call your pharmacy and they will forward the refill request to us. Please allow 3 business days for your refill to be completed.          Additional Information About Your Visit        [employer] Information     [employer] lets you send messages to your doctor, view your test results, renew your prescriptions, schedule appointments and more. To sign up, go to [email or web address] . Click on \"Log in\" on the left side of the screen, which will take you to the Welcome page. Then click on \"Sign up Now\" on the right side of the page.     You will be asked to enter the access code listed below, as well as some personal information. Please follow the directions to create your username and password.     Your access code is: [email or web address]  Expires: 2018  6:30 AM     Your access code will  in 90 days. If you need help or a new code, please call your [facility] clinic or [phone].        Care EveryWhere ID     This is your Care EveryWhere ID. This could be used by other organizations to access your [facility] medical records  [id number]        Your Vitals Were     Pulse Height Pulse Oximetry BMI (Body Mass Index)          63 1.549 m (5' 1\") 96% 29.1 kg/m2         Blood Pressure from Last 3 Encounters:   18 101/61   18 102/65   18 114/70    Weight from Last 3 " Encounters:   08/30/18 69.9 kg (154 lb)   08/28/18 72.9 kg (160 lb 12.8 oz)   04/08/18 70.8 kg (156 lb)               Primary Care Provider Office Phone # Fax #    Erica [facility] [phone] [address]  [facility] [address]        Equal Access to Services     KING RAND : Hadii aad ku hadasho Soomaali, waaxda luqadaha, qaybta kaalmada adeegyada, waxay idiin hayaan adeeg khsegundosh la'aan ah. So [facility] [phone].    ATENCIÓN: Si habla español, tiene a murdock disposición servicios gratuitos de asistencia lingüística. Llame al [phone].    We comply with applicable federal civil rights laws and [place] laws. We do not discriminate on the basis of race, color, national origin, age, disability, sex, sexual orientation, or gender identity.            Thank you!     Thank you for choosing [facility]  for your care. Our goal is always to provide you with excellent care. Hearing back from our patients is one way we can continue to improve our services. Please take a few minutes to complete the written survey that you may receive in the mail after your visit with us. Thank you!             Your Updated Medication List - Protect others around you: Learn how to safely use, store and throw away your medicines at www.disposemymeds.org.          This list is accurate as of 8/30/18 11:08 AM.  Always use your most recent med list.                   Brand Name Dispense Instructions for use Diagnosis    aspirin 81 MG EC tablet      Take 81 mg by mouth daily        CELLCEPT 500 MG tablet      Take 1,000 mg by mouth 2 times daily        cycloSPORINE modified 25 MG capsule    GENERIC EQUIVALENT     Take 25 mg by mouth every evening        DILTIAZEM  MG 24 hr capsule   Generic drug:  diltiazem      Take 120 mg by mouth daily        furosemide 20 MG tablet    LASIX    30 tablet    Take 1 tablet (20 mg) by mouth daily    Cirrhosis of liver with ascites, unspecified hepatic cirrhosis  type (H)       metoprolol tartrate 25 MG tablet    LOPRESSOR     Take 12.5 mg by mouth 2 times daily        predniSONE 5 MG tablet    DELTASONE     Take 5 mg by mouth daily        PROAIR  (90 Base) MCG/ACT inhaler   Generic drug:  albuterol     8.5 g    INHALE 2 PUFFS BY MOUTH EVERY 6 HOURS AS NEEDED FOR SHORTNESS OF BREATH/DYPSNEA/WHEEZING    Exacerbation of asthma, unspecified asthma severity, unspecified whether persistent       spironolactone 50 MG tablet    ALDACTONE    30 tablet    Take 1 tablet (50 mg) by mouth daily    Cirrhosis of liver with ascites, unspecified hepatic cirrhosis type (H)

## 2018-08-30 NOTE — NURSING NOTE
Cardiac Testing: Patient given instructions regarding  Nuc Lexiscan Stress Test. Discussed purpose, preparation, procedure and when to expect results reported back to the patient. Patient demonstrated understanding of this information and agreed to call with further questions or concerns.  Patient stated he understood all health information given and agreed to call with further questions or concerns.

## 2018-08-30 NOTE — PROGRESS NOTES
"I am delighted to see Junior Patricia in consultation for cardiovascular evaluation pre liver transplant.    History of Present Illness:  As you know, the patient is a 68 year old  Male who is accompanied by his wife and an [facility] . 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 A dobutamine stress echo was ordered for yesterday, test was unable to be completed due to hypotension to the 70s. He did feel slightly dizzy at that time.    The patient had a kidney transplant in 2004, followed at [facility]. He has type II diabetes and is on insulin bid (not on med list, patient did not mention it to staff, wife does not know type/dose). No prior cardiac disease. At baseline he is very sedentary, gets around in a wheelchair, can walk in his house but does not do much more. Denies chest pressure, dizziness, orthopnea, syncope.      The following portions of the patient's history were reviewed and updated as appropriate: allergies, current medications, past family history, past medical history, past social history, past surgical history, and the problem list.    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 He has never used smokeless tobacco. He reports that he does not " "drink alcohol or use illicit drugs.    Review of systems:   Cardiovascular: No palpitations, chest pain, shortness of breath at rest, dyspnea with exertion, orthopnea, paroxysmal nocturia dyspnea, nocturia, dizziness, syncope.    In addition,   Constitutional: No change in weight, sleep or appetite.  Normal energy.  No fever or chills  Eyes: Negative for vision changes or eye problems  ENT: No problems with ears, nose or throat.  No difficulty swallowing.  Resp: No coughing, wheezing or shortness of breath  GI: No nausea, vomiting,  heartburn, abdominal pain, diarrhea, constipation or change in bowel habits  : No urinary frequency or dysuria, bladder or kidney problems  Musculoskeletal: No significant muscle or joint pains  Neurologic: No headaches, numbness, tingling, weakness, problems with balance or coordination  Psychiatric: No problems with anxiety, depression or mental health  Heme/immune/allergy: No history of bleeding or clotting problems or anemia.  No allergies or immune system problems  Integumentary: No rashes,worrisome lesions or skin problems      Physical examination  Vitals: /61 (BP Location: Right arm, Patient Position: Chair, Cuff Size: Adult Regular)  Pulse 63  Ht 1.549 m (5' 1\")  Wt 69.9 kg (154 lb)  SpO2 96%  BMI 29.1 kg/m2  BMI= Body mass index is 29.1 kg/(m^2).    Constitutional: In general, the patient is in no apparent distress. Sitting in wheelchair, huddled up in jacket, didn't say much.    Eyes: PERRLA.  EOMI.  ENT/mouth: Normiocephalic and atraumatic.  Nares clear.  Pharynx without erythema or exudate.  Dentition intact.  No adenopathy.  No thyromegaly. Carotids +2/2 bilaterally without bruits.  No jugular venous distension.   Card/Vasc: The PMI is in the 5th ICS in the midclavicular line. There is no heave. Regular rate and rhythm. Normal S1, S2. Soft systolic murmur, rub, click, or gallop. Pulses are normal bilaterally throughout. 1+ peripheral edema.  Respiratory: Clear " to asculation.  No ronchi, wheezes, rales.  No dullness to percussion.   GI: Abdomen is soft, nontender, mildly distended. No organomegaly. No AAA.  No bruits.   Integument: No significant bruises or rashes  Neurological: The neurological examination reveal a patient who was oriented to person, place, and time.    Psych: Normal  Heme/Lymph/Immun: no significant adenopathy      I have reviewed the following labs/imaging:  Labs 8/27/18: Na 127, K 3.7, , cr 4.87, hgb 9.8, plt 49K, TSH 47, INR 1.2  Creatinine 3.02 on 5/2/18; 1.65 on 2/14/18    I have reviewed records from [facility]. Relevant findings are:  EKG 5/2/18: sinus 80 bpm, normal intervals  2D Echo 8/21/18; LVEF 71%, no valve disease, aortic root 3.23 cm  Stress echo 8/27/18: Nondiagnostic, test terminated at 38% heart rate due to hypotension (72/52) and ectopy. Resting EF 60-65%, no wall motion abnl    Assessment :  1. Liver disease anticipating liver transplant. No cardiac history. Unable to complete dobutamine stress echo due to hypotension. He has type II diabetes, likely has microvascular disease. Coronary angiogram would be preferable but he now has acute renal failure, IV dye should be avoided. Will proceed with pharmacologic nuclear stress test.  2. Acute renal failure, s/p renal transplant. [place] [facility] Avoid IV dye.  3. Type II diabetes.  4. ? Hypertension. I am unclear why he is on low doses of metoprolol and diltiazem. Cannot find documentation in any records.      Plan:  Lexiscan nuclear stress test    I spent a total of 30 minutes face to face with  Junior Patricia during today's office visit. Over 50% of this time was spent counseling the patient and/or coordinating care regarding his preop management.        The patient is to return as needed pending stress test results. The patient understood the treatment plan as outlined above.  There were no barriers to learning.      Zenobia Alexis MD

## 2018-08-30 NOTE — PATIENT INSTRUCTIONS
"You were seen today in the Cardiovascular Clinic at the [facility].     Cardiology Providers you saw during your visit: Dr. Zenobia Alexis     Diagnosis:   Encounter Diagnosis   Name Primary?     Pre-operative cardiovascular examination Yes        Results: Discussed with you today       Orders:   Orders Placed This Encounter   Procedures     NM Lexiscan stress test (nuc card)       Medication Changes:   None    Medications Discontinued:  There are no discontinued medications.      Recommendations:   1. Lexiscan Stress test       Follow up with Provider - As needed         Please feel free to call me with any questions or concerns.       Chela Stubbs LPN     Questions: [phone]  First press #1 for [employer] for \"Medical Questions\" to reach us Cardiology Nurses.   [phone]   First press #1 for the [employer] and then press #1     On Call Cardiologist for after hours or on weekends: [phone]   option #4 and ask to speak to the on-call Cardiologist.          If you need a medication refill please contact your pharmacy.  Please allow 3 business days for your refill to be completed.  --------------------------------------------------------------  Prep for xiao-nuc stress test: Report to the  [facility] Waiting Room at the [facility]. The hospital is located at [address] [place] [address] on the [place] bank of the [place].    This test can take up to 3 hours.    Bring inhaler to the stress test to help treat your asthma if needed.     Please let nurse know if you need more than a 2 person assist or a liset lift during the exam.     NPO 3 hours prior, Water is ok and encouraged    NO alcohol, smoking, caffeine, or decaf products 12 hours prior    TAKE ALL medications as prescribed, hold the following medications if they pertain to you:   If you take Aggrenox or dipyridamole (Persantine, Permole), stop taking it 48 hours before your test.   If you take Viagra, Cialis or Levitra, stop taking it 48 " hours before your test.   If you take theophylline or aminophylline, stop taking it 12 hours before your test.   For patients with diabetes:If you take insulin, call your diabetes care team. Ask if you should take a 1/2 dose the morning of your test.   If you take diabetes medicine by mouth, don t take it on the morning of your test. Bring it with you to take after the test. (If you have questions, call your diabetes care team.)      Do not take nitrates on the day of your test. Do not wear your Nitro-Patch    If you have further questions, please utilize [employer] to contact us.   If your question concerns the above instructions, contact:  Chela Stubbs LPN  Nurse Care Coordinator- Heart Care

## 2018-08-30 NOTE — LETTER
8/30/2018       RE: Junior Patricia  [address] [place] [address]     Dear Colleague,    Thank you for referring your patient, Junior Patricia, to the [facility] TRANSPL[facility] at [facility]. Please see a copy of my visit note below.    Liver Transplant Evaluation/Teaching    TEACHING TOPICS: Evaluation Process, Evaluation Items, Diagnostic Studies, Consultation, Chemical Dependency Policy, CD Eval, Donor Source, Liver Allocation, MELD System, UNOS, Waiting List, Follow up while on transplant list, Follow up after transplantation, Infection and Rejection, Immunosuppression , Medication Teaching, Lab Recording after transplant, Laboratory Frequency after transplant , Consent for evaluation and One year survival rates  INSTRUCTIONAL MATERIAL USED/GIVEN: Liver Transplant Handbook, MELD Booklet, Donor Booklet, Web Sites Options, Verbal instructions, Multiple Listing Brochure , Consent for evaluation signed, One year survival rates and SRTR (Scientific Registry) Data  Person(s) involved in teaching: wife, patient and   Asks Questions: YES  Eager to Learn: YES  Cooperative: YES  Receptive (willing/able to accept information): YES  Reason for the appointment, diagnosis and treatment plan:YES  Knowledge of proper use of medications and conditions for which they are ordered (with special attention to potential side effects or drug interactions): YES  Which situations necessitate calling provider and whom to contact:YES  Other: signed receipt of information and alcohol policy  Teaching Concerns Addressed   Comments: ED for GI bleed or fever >100.5.   Proper use and care of (medical equip, care aids, etc.):YES  Nutritional needs and diet plan: YES  Pain management techniques: YES  Wound Care: YES  How and/when to access community resources: YES  Patient is aware of and agrees to required commitment to post-op care and long term follow-up: YES  Patient has name and  phone number of transplant coordinator.   Time Spent face-to-face teachin minutes.            Again, thank you for allowing me to participate in the care of your patient.      Sincerely,    LANGUAGE BANC

## 2018-08-30 NOTE — NURSING NOTE
Chief Complaint   Patient presents with     New Patient     67 yo man with preexisting ESRD/txp and hep B present for liver evaluation      Vitals were taken and medications were reconciled.  Benoit Loza, ADALI  9:18 AM

## 2018-08-30 NOTE — LETTER
8/30/2018      RE: Junior Patricia  [address]  [place] [address]       Dear Colleague,    Thank you for the opportunity to participate in the care of your patient, Junior Patricia, at the [facility]. Please see a copy of my visit note below.    I am delighted to see Junior Patricia in consultation for cardiovascular evaluation pre liver transplant.    History of Present Illness:  As you know, the patient is a 68 year old  Male who is accompanied by his wife and an [facility] . 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 A dobutamine stress echo was ordered for yesterday, test was unable to be completed due to hypotension to the 70s. He did feel slightly dizzy at that time.    The patient had a kidney transplant in 2004, followed at [facility]. He has type II diabetes and is on insulin bid (not on med list, patient did not mention it to staff, wife does not know type/dose). No prior cardiac disease. At baseline he is very sedentary, gets around in a wheelchair, can walk in his house but does not do much more. Denies chest pressure, dizziness, orthopnea, syncope.      The following portions of the patient's history were reviewed and updated as appropriate: allergies, current medications, past family history, past medical history, past social history, past surgical history, and the problem list.    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 He has never used smokeless tobacco. He reports that he does not drink alcohol or use illicit drugs.    Review of systems:   Cardiovascular: No palpitations, chest pain, shortness of breath at rest, dyspnea with exertion, orthopnea, paroxysmal nocturia dyspnea, nocturia, dizziness, syncope.    In addition,   Constitutional: No change in weight, sleep or appetite.  Normal energy.  No fever or chills  Eyes: Negative for vision changes or eye problems  ENT: No problems with ears, nose or throat.  No difficulty swallowing.  Resp: No coughing, wheezing or shortness of breath  GI: No nausea, vomiting,  heartburn, abdominal pain, diarrhea, constipation or change in bowel habits  : No urinary frequency or dysuria, bladder or kidney problems  Musculoskeletal: No significant muscle or joint pains  Neurologic: No headaches, numbness, tingling, weakness, problems with balance or coordination  Psychiatric: No problems with anxiety, depression or mental health  Heme/immune/allergy: No history of bleeding or clotting problems or anemia.  No allergies or immune system problems  Integumentary: No rashes,worrisome lesions or skin problems      Physical examination  Vitals: /61 (BP Location: Right arm, Patient Position: Chair, Cuff Size: Adult Regular)  Pulse 63  Ht 1.549 m (5' 1\")  Wt 69.9 kg (154 lb)  SpO2 96%  BMI 29.1 kg/m2  BMI= Body mass index is 29.1 kg/(m^2).    Constitutional: In general, the patient is in no apparent distress. Sitting in wheelchair, huddled up in jacket, didn't say much.    Eyes: PERRLA.  EOMI.  ENT/mouth: Normiocephalic and atraumatic.  Nares clear.  Pharynx without erythema or exudate.  Dentition intact.  No adenopathy.  No thyromegaly. " Carotids +2/2 bilaterally without bruits.  No jugular venous distension.   Card/Vasc: The PMI is in the 5th ICS in the midclavicular line. There is no heave. Regular rate and rhythm. Normal S1, S2. Soft systolic murmur, rub, click, or gallop. Pulses are normal bilaterally throughout. 1+ peripheral edema.  Respiratory: Clear to asculation.  No ronchi, wheezes, rales.  No dullness to percussion.   GI: Abdomen is soft, nontender, mildly distended. No organomegaly. No AAA.  No bruits.   Integument: No significant bruises or rashes  Neurological: The neurological examination reveal a patient who was oriented to person, place, and time.    Psych: Normal  Heme/Lymph/Immun: no significant adenopathy      I have reviewed the following labs/imaging:  Labs 8/27/18: Na 127, K 3.7, , cr 4.87, hgb 9.8, plt 49K, TSH 47, INR 1.2  Creatinine 3.02 on 5/2/18; 1.65 on 2/14/18    I have reviewed records from [facility]. Relevant findings are:  EKG 5/2/18: sinus 80 bpm, normal intervals  2D Echo 8/21/18; LVEF 71%, no valve disease, aortic root 3.23 cm  Stress echo 8/27/18: Nondiagnostic, test terminated at 38% heart rate due to hypotension (72/52) and ectopy. Resting EF 60-65%, no wall motion abnl    Assessment :  1. Liver disease anticipating liver transplant. No cardiac history. Unable to complete dobutamine stress echo due to hypotension. He has type II diabetes, likely has microvascular disease. Coronary angiogram would be preferable but he now has acute renal failure, IV dye should be avoided. Will proceed with pharmacologic nuclear stress test.  2. Acute renal failure, s/p renal transplant. Se[place]rolog[place] [facility]. Avoid IV dye.  3. Type II diabetes.  4. ? Hypertension. I am unclear why he is on low doses of metoprolol and diltiazem. Cannot find documentation in any records.      Plan:  Lexiscan nuclear stress test    I spent a total of 30 minutes face to face with  Junior Patricia during today's office visit. Over 50% of  this time was spent counseling the patient and/or coordinating care regarding his preop management.        The patient is to return as needed pending stress test results. The patient understood the treatment plan as outlined above.  There were no barriers to learning.      Zenobia Alexis MD     Please do not hesitate to contact me if you have any questions/concerns.     Sincerely,     Zenobia Alexis MD

## 2018-08-30 NOTE — PROGRESS NOTES
Liver Transplant Evaluation/Teaching    TEACHING TOPICS: Evaluation Process, Evaluation Items, Diagnostic Studies, Consultation, Chemical Dependency Policy, CD Eval, Donor Source, Liver Allocation, MELD System, UNOS, Waiting List, Follow up while on transplant list, Follow up after transplantation, Infection and Rejection, Immunosuppression , Medication Teaching, Lab Recording after transplant, Laboratory Frequency after transplant , Consent for evaluation and One year survival rates  INSTRUCTIONAL MATERIAL USED/GIVEN: Liver Transplant Handbook, MELD Booklet, Donor Booklet, Web Sites Options, Verbal instructions, Multiple Listing Brochure , Consent for evaluation signed, One year survival rates and SRTR (Scientific Registry) Data  Person(s) involved in teaching: wife, patient and   Asks Questions: YES  Eager to Learn: YES  Cooperative: YES  Receptive (willing/able to accept information): YES  Reason for the appointment, diagnosis and treatment plan:YES  Knowledge of proper use of medications and conditions for which they are ordered (with special attention to potential side effects or drug interactions): YES  Which situations necessitate calling provider and whom to contact:YES  Other: signed receipt of information and alcohol policy  Teaching Concerns Addressed   Comments: ED for GI bleed or fever >100.5.   Proper use and care of (medical equip, care aids, etc.):YES  Nutritional needs and diet plan: YES  Pain management techniques: YES  Wound Care: YES  How and/when to access community resources: YES  Patient is aware of and agrees to required commitment to post-op care and long term follow-up: YES  Patient has name and phone number of transplant coordinator.   Time Spent face-to-face teachin minutes.

## 2018-08-30 NOTE — LETTER
Date:August 31, 2018      Provider requested that no letter be sent. Do not send.       [facility] Health Information

## 2018-08-31 NOTE — Clinical Note
Michelle- Can you please schedule the MRI-no contrast.  Tiarra-will you set up there US guided biopsy?  Thanks, Lindsey

## 2018-08-31 NOTE — PROGRESS NOTES
Juniro Patricia discussed at tumor conference on 2018.    US 2018 Imagin18 GFR 8 ([facility])    Recommendations:  Non contrast MRI  US guided biopsy    RABIA EmmanuelN, RN  Liver transplant coordinator  [phone] Office

## 2018-09-11 ENCOUNTER — HOSPITAL ENCOUNTER (OUTPATIENT)
Dept: ULTRASOUND IMAGING | Facility: HOSPITAL | Age: 68
Discharge: HOME | End: 2018-09-11

## 2018-09-25 NOTE — PROGRESS NOTES
Psychosocial Assessment  Junior Patricia was seen in the Transplant Center as part of his evaluation as a potential liver  transplant recipient.  He was accompanied by his wife, and a [facility] .   Living Situation: Junior Patricia is a [age] year old man who lives in [place] with his wife of forty-five years, Sakina Patricia. Their house is one story, with three steps to get inside. [place]. They spent 3-4 years in [place] before moving to [place], where they lived for over thirteen years before moving to [place]. Mr. Patricia is a [place] citizen.   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   Financial /Income: Mr. Patricia receives Social Security halfway income, as does his wife.   Health Insurance: Mr. Patricia has Medicare with BCBS [place]. He also has Medicaid with a QMB program, which covers the co-insurance, deductibles, and co-payments for Medicare Part A & B.  Immunosuppression medication should be covered under his Part B Medicare plan, with the remaining 20% his responsibility. Medicaid and QMB are likely covering this expense. He also has a Part D policy via [employer] for his remaining medications.  This writer talked with Junior about the financial risks of transplant, particularly about the high cost of transplant related medications and the importance of maintaining adequate health insurance coverage.  Family/Social Support: Mr. Swains family is his main source of support. In addition to his wife, they have three children, two sons and a daughter. All live in the [place] and come to help if called/asked. Their daughter already helps a lot, and was his kidney donor.  Mr. Patricia is [employer], and finds support in his beliefs. This writer stressed the importance of having a stable and involved support network before and after transplant.  Provided Junior  kaur Presley with education about the relationship between a stable support system and better surgical and post-transplant outcomes compared to patients with a limited support system.    Functional Status: Mr. Patricia uses a cane to assist with ambulation. He also has a walker, if needed. His wife provides for meals, housekeeping, etc. He usually manages his own medications. He currently is reporting increased fatigue.   Chemical Dependency:  Mr. Patricia does not currently use alcohol or other illicit substances, and reports no history of abuse.   Mental Health: Mr. Patricia did not acknowledge any history of mental health concerns.   Adjustment to Illness:  Mr. Patricia had a previous kidney transplant at [facility], for which he was hospitalized about a week. He is beginning to understand that having a liver transplant here will be a different experience for him. He has HBV, and more recently was diagnosed with HCC.  He seamus with the help of family and his real. This writer provided Delmy with supportive counseling throughout this interview.  This writer also encouraged them to attend the liver transplant support group for additional support and encouragement, with an .    Impression/Recommendations:   Junior and Sakina verbalized understanding the psychosocial risks of transplant and teaching provided during this evaluation. As he is a former kidney transplant recipient, they are informed about the need for medications, labs and medical monitoring post transplant. They also understand that a liver transplant experience will be/is different from his first transplant.   Mr. Patricia's support network and finances are adequate for transplant. He currently receives his medications through a specialty pharmacy elsewhere. He has no psychosocial barriers to transplant, and is therefore an acceptable transplant candidate from this perspective.    Teaching completed during assessment:  1.     Housing and relocation needs post  transplant.  2.     Caregiver needs post transplant.  3.     Financial issues related to transplant.  4.     Risks of alcohol use post transplant.  5.     Common psychosocial stressors pre/post transplant.        6.     Liver Transplant support group availability.        7.     [employer] Directive             Psychosocial Risks of Transplant Reviewed:  1.     Increased stress related to your emotional, family, social, employment, or   financial situation.  2.     Affect on work and/or disability benefits.  3.     Affect on future health and life insurance.  4.     Transplant outcome expectations may not be met.  5.     Mental Health risks: anxiety, depression, PTSD, guilt, grief and chronic fatigue.     LUIS Choe, LICSW

## 2018-10-17 LAB — BACTERIA SPEC CULT: NORMAL

## 2020-10-08 NOTE — TELEPHONE ENCOUNTER
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 MELD driven by Creatinine (Creat 2.64, Na 135, T Bili 1.3, INR 1.4), however plts in the 30's with ascites requiring paracentesis up to 2x/month(monthly for a year) with 2 episodes of SBP. EGD in 7/2018 no varices or indications of portal HTN. No HE.  Renal dysfunction since April 2018, with spontaneous bacterial peritonitis.\"Three-drug immunosuppression: CSA (level 91 last month), prednisone 5 mg q day (but frequent bursts for gout), MMF (MPA AUC 19, see below). 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 Since then has shown steady rise to 3353 on 6-.  This despite decreasing MMF from 500 to 250 mg BID in April. MPA AUC on 250 mg BID 19. He does not have any symptoms of CMV disease. As discussed in previous notes, treatment with Valcyte will be difficult given chronic leukopenia. \"    AFP 18-32  CT 5/2018 Cirrhosis.  2.  Large amount of ascites abdomen and pelvis.  3.  Extensive atherosclerosis.  4.  Renal transplant left pelvis.  5.  Anasarca.  6.  No evidence of bowel obstruction.  7.  Ventral umbilical hernia.    PMH: DM 2, hyperlipidemia, obesity, asthma, NANCY, CKD stage 3.    EGD 2/9/18: normal, no portal HTN.      " DC Date 10/7/20  Reached out to schedule HFU.  Unable to schedule at time of call LVM.  C3 notified.

## 2021-06-01 VITALS — HEIGHT: 61 IN | BODY MASS INDEX: 29.64 KG/M2 | WEIGHT: 157 LBS

## 2021-08-03 PROBLEM — D69.6 THROMBOCYTOPENIA (H): Status: RESOLVED | Noted: 2018-01-01 | Resolved: 2018-01-01

## 2025-05-06 NOTE — MR AVS SNAPSHOT
"              After Visit Summary   2018    Junior Patricia    MRN: [id number]           Patient Information     Date Of Birth          1950        Visit Information        Provider Department      2018 8:40 AM Abbi Zarco APRN CNP [facility]        Today's Diagnoses     Scleral hemorrhage of left eye    -  1       Follow-ups after your visit        Follow-up notes from your care team     Return for Proceed to ED.      Who to contact     If you have questions or need follow up information about today's clinic visit or your schedule please contact [facility] directly at [phone].  Normal or non-critical lab and imaging results will be communicated to you by MyChart, letter or phone within 4 business days after the clinic has received the results. If you do not hear from us within 7 days, please contact the clinic through MyChart or phone. If you have a critical or abnormal lab result, we will notify you by phone as soon as possible.  Submit refill requests through [employer] or call your pharmacy and they will forward the refill request to us. Please allow 3 business days for your refill to be completed.          Additional Information About Your Visit        MyChart Information     [employer] lets you send messages to your doctor, view your test results, renew your prescriptions, schedule appointments and more. To sign up, go to [email or web address] . Click on \"Log in\" on the left side of the screen, which will take you to the Welcome page. Then click on \"Sign up Now\" on the right side of the page.     You will be asked to enter the access code listed below, as well as some personal information. Please follow the directions to create your username and password.     Your access code is: [email or web address]  Expires: 2018 10:04 AM     Your access code will  in 90 days. If you need help or a new code, please call your [place] clinic or [phone].   "      Care EveryWhere ID     This is your Care EveryWhere ID. This could be used by other organizations to access your [place] medical records  [id number]        Your Vitals Were     Pulse Temperature Respirations Pulse Oximetry BMI (Body Mass Index)       68 98  F (36.7  C) (Oral) 14 100% 30.47 kg/m2        Blood Pressure from Last 3 Encounters:   04/08/18 114/70   10/27/17 115/78   10/25/17 126/77    Weight from Last 3 Encounters:   04/08/18 156 lb (70.8 kg)   10/25/17 194 lb (88 kg)   10/13/17 186 lb (84.4 kg)              Today, you had the following     No orders found for display       Primary Care Provider Office Phone # Fax #    [facility] [phone] [address] [facility] [address]        Equal Access to Services     [place] : Hadii sherry wells Sotri, waaxda luqadaha, qaybta kaalmada adeelizabethyada, rigoberto sears . So [facility] [phone].    ATENCIÓN: Si habla español, tiene a murdock disposición servicios gratuitos de asistencia lingüística. Llame al [phone].    We comply with applicable federal civil rights laws and [place] laws. We do not discriminate on the basis of race, color, national origin, age, disability, sex, sexual orientation, or gender identity.            Thank you!     Thank you for choosing [facility]  for your care. Our goal is always to provide you with excellent care. Hearing back from our patients is one way we can continue to improve our services. Please take a few minutes to complete the written survey that you may receive in the mail after your visit with us. Thank you!             Your Updated Medication List - Protect others around you: Learn how to safely use, store and throw away your medicines at www.disposemymeds.org.          This list is accurate as of 4/8/18 10:04 AM.  Always use your most recent med list.                   Brand Name Dispense Instructions for use Diagnosis     aspirin 81 MG EC tablet      Take 81 mg by mouth daily        CELLCEPT 500 MG tablet      Take 1,000 mg by mouth 2 times daily        cycloSPORINE modified 25 MG capsule    GENERIC EQUIVALENT     Take 25 mg by mouth every evening        DILTIAZEM  MG 24 hr capsule   Generic drug:  diltiazem      Take 120 mg by mouth daily        furosemide 20 MG tablet    LASIX    30 tablet    Take 1 tablet (20 mg) by mouth daily    Cirrhosis of liver with ascites, unspecified hepatic cirrhosis type (H)       metoprolol tartrate 25 MG tablet    LOPRESSOR     Take 12.5 mg by mouth 2 times daily        predniSONE 5 MG tablet    DELTASONE     Take 5 mg by mouth daily        PROAIR  (90 BASE) MCG/ACT Inhaler   Generic drug:  albuterol     8.5 g    INHALE 2 PUFFS BY MOUTH EVERY 6 HOURS AS NEEDED FOR SHORTNESS OF BREATH/DYPSNEA/WHEEZING    Exacerbation of asthma, unspecified asthma severity, unspecified whether persistent       spironolactone 50 MG tablet    ALDACTONE    30 tablet    Take 1 tablet (50 mg) by mouth daily    Cirrhosis of liver with ascites, unspecified hepatic cirrhosis type (H)          SAMIR Shook RN

## (undated) RX ORDER — METOPROLOL TARTRATE 1 MG/ML
INJECTION, SOLUTION INTRAVENOUS
Status: DISPENSED
Start: 2018-01-01

## (undated) RX ORDER — DOBUTAMINE HYDROCHLORIDE 200 MG/100ML
INJECTION INTRAVENOUS
Status: DISPENSED
Start: 2018-01-01